# Patient Record
Sex: FEMALE | Race: WHITE | Employment: OTHER | URBAN - NONMETROPOLITAN AREA
[De-identification: names, ages, dates, MRNs, and addresses within clinical notes are randomized per-mention and may not be internally consistent; named-entity substitution may affect disease eponyms.]

---

## 2017-01-05 LAB
CHOLESTEROL, TOTAL: 173 MG/DL
CHOLESTEROL/HDL RATIO: NORMAL
HDLC SERPL-MCNC: 39 MG/DL (ref 35–70)
LDL CHOLESTEROL CALCULATED: 82 MG/DL (ref 0–160)
TRIGL SERPL-MCNC: 260 MG/DL
VLDLC SERPL CALC-MCNC: NORMAL MG/DL

## 2017-05-09 ENCOUNTER — PROCEDURE VISIT (OUTPATIENT)
Dept: CARDIOLOGY CLINIC | Age: 72
End: 2017-05-09

## 2017-05-09 DIAGNOSIS — Z95.2 AORTIC VALVE PROSTHESIS PRESENT: Primary | ICD-10-CM

## 2017-05-09 LAB
LV EF: 58 %
LVEF MODALITY: NORMAL

## 2017-05-09 PROCEDURE — 93306 TTE W/DOPPLER COMPLETE: CPT | Performed by: INTERNAL MEDICINE

## 2017-05-10 ENCOUNTER — TELEPHONE (OUTPATIENT)
Dept: CARDIOLOGY CLINIC | Age: 72
End: 2017-05-10

## 2017-05-16 ENCOUNTER — OFFICE VISIT (OUTPATIENT)
Dept: CARDIOLOGY CLINIC | Age: 72
End: 2017-05-16

## 2017-05-16 VITALS
HEART RATE: 78 BPM | HEIGHT: 64 IN | SYSTOLIC BLOOD PRESSURE: 118 MMHG | DIASTOLIC BLOOD PRESSURE: 68 MMHG | BODY MASS INDEX: 26.98 KG/M2 | WEIGHT: 158 LBS

## 2017-05-16 DIAGNOSIS — Z95.2 AORTIC VALVE PROSTHESIS PRESENT: ICD-10-CM

## 2017-05-16 DIAGNOSIS — I10 ESSENTIAL HYPERTENSION: ICD-10-CM

## 2017-05-16 DIAGNOSIS — E78.2 MIXED HYPERLIPIDEMIA: ICD-10-CM

## 2017-05-16 DIAGNOSIS — I25.10 CORONARY ARTERY DISEASE INVOLVING NATIVE HEART WITHOUT ANGINA PECTORIS, UNSPECIFIED VESSEL OR LESION TYPE: ICD-10-CM

## 2017-05-16 DIAGNOSIS — Z95.1 S/P CABG X 1: Primary | ICD-10-CM

## 2017-05-16 PROCEDURE — 99214 OFFICE O/P EST MOD 30 MIN: CPT | Performed by: INTERNAL MEDICINE

## 2017-05-16 PROCEDURE — 1036F TOBACCO NON-USER: CPT | Performed by: INTERNAL MEDICINE

## 2017-05-16 PROCEDURE — G8420 CALC BMI NORM PARAMETERS: HCPCS | Performed by: INTERNAL MEDICINE

## 2017-05-16 PROCEDURE — 3014F SCREEN MAMMO DOC REV: CPT | Performed by: INTERNAL MEDICINE

## 2017-05-16 PROCEDURE — 93000 ELECTROCARDIOGRAM COMPLETE: CPT | Performed by: INTERNAL MEDICINE

## 2017-05-16 PROCEDURE — 4040F PNEUMOC VAC/ADMIN/RCVD: CPT | Performed by: INTERNAL MEDICINE

## 2017-05-16 PROCEDURE — 3017F COLORECTAL CA SCREEN DOC REV: CPT | Performed by: INTERNAL MEDICINE

## 2017-05-16 PROCEDURE — G8598 ASA/ANTIPLAT THER USED: HCPCS | Performed by: INTERNAL MEDICINE

## 2017-05-16 PROCEDURE — 1123F ACP DISCUSS/DSCN MKR DOCD: CPT | Performed by: INTERNAL MEDICINE

## 2017-05-16 PROCEDURE — G8400 PT W/DXA NO RESULTS DOC: HCPCS | Performed by: INTERNAL MEDICINE

## 2017-05-16 PROCEDURE — 1090F PRES/ABSN URINE INCON ASSESS: CPT | Performed by: INTERNAL MEDICINE

## 2017-05-16 PROCEDURE — G8427 DOCREV CUR MEDS BY ELIG CLIN: HCPCS | Performed by: INTERNAL MEDICINE

## 2017-05-16 RX ORDER — ASPIRIN 81 MG/1
81 TABLET ORAL DAILY
Qty: 30 TABLET | Refills: 3 | Status: SHIPPED | OUTPATIENT
Start: 2017-05-16 | End: 2018-12-04 | Stop reason: ALTCHOICE

## 2017-05-16 RX ORDER — LISINOPRIL AND HYDROCHLOROTHIAZIDE 12.5; 1 MG/1; MG/1
1 TABLET ORAL DAILY
Qty: 90 TABLET | Refills: 3 | Status: ON HOLD | OUTPATIENT
Start: 2017-05-16 | End: 2017-10-12 | Stop reason: HOSPADM

## 2017-11-03 ENCOUNTER — INITIAL CONSULT (OUTPATIENT)
Dept: PULMONOLOGY | Age: 72
End: 2017-11-03

## 2017-11-03 VITALS
OXYGEN SATURATION: 95 % | HEART RATE: 85 BPM | DIASTOLIC BLOOD PRESSURE: 86 MMHG | SYSTOLIC BLOOD PRESSURE: 126 MMHG | BODY MASS INDEX: 23.22 KG/M2 | RESPIRATION RATE: 18 BRPM | HEIGHT: 64 IN | WEIGHT: 136 LBS

## 2017-11-03 DIAGNOSIS — J90 PLEURAL EFFUSION, RIGHT: Primary | ICD-10-CM

## 2017-11-03 DIAGNOSIS — C18.7 MALIGNANT NEOPLASM OF SIGMOID COLON (HCC): ICD-10-CM

## 2017-11-03 DIAGNOSIS — G47.34 NOCTURNAL HYPOXEMIA: ICD-10-CM

## 2017-11-03 DIAGNOSIS — J90 PLEURAL EFFUSION, RIGHT: ICD-10-CM

## 2017-11-03 LAB
DLCO %PRED: NORMAL
DLCO PRE: NORMAL
FEF 25-75%-POST: 0.86
FEF 25-75%-PRE: 0.97
FEV1-POST: 1.36
FEV1-PRE: 1.33
FEV1/FVC-POST: 60.7
FEV1/FVC-PRE: 72.9
FVC-POST: 2.25
FVC-PRE: 1.82
MEP: NORMAL
MIP: NORMAL
TLC %PRED: NORMAL
TLC PRE: NORMAL

## 2017-11-03 PROCEDURE — G8400 PT W/DXA NO RESULTS DOC: HCPCS | Performed by: INTERNAL MEDICINE

## 2017-11-03 PROCEDURE — G8598 ASA/ANTIPLAT THER USED: HCPCS | Performed by: INTERNAL MEDICINE

## 2017-11-03 PROCEDURE — 1111F DSCHRG MED/CURRENT MED MERGE: CPT | Performed by: INTERNAL MEDICINE

## 2017-11-03 PROCEDURE — 1090F PRES/ABSN URINE INCON ASSESS: CPT | Performed by: INTERNAL MEDICINE

## 2017-11-03 PROCEDURE — G8427 DOCREV CUR MEDS BY ELIG CLIN: HCPCS | Performed by: INTERNAL MEDICINE

## 2017-11-03 PROCEDURE — 3017F COLORECTAL CA SCREEN DOC REV: CPT | Performed by: INTERNAL MEDICINE

## 2017-11-03 PROCEDURE — 4040F PNEUMOC VAC/ADMIN/RCVD: CPT | Performed by: INTERNAL MEDICINE

## 2017-11-03 PROCEDURE — 1036F TOBACCO NON-USER: CPT | Performed by: INTERNAL MEDICINE

## 2017-11-03 PROCEDURE — G8420 CALC BMI NORM PARAMETERS: HCPCS | Performed by: INTERNAL MEDICINE

## 2017-11-03 PROCEDURE — 99215 OFFICE O/P EST HI 40 MIN: CPT | Performed by: INTERNAL MEDICINE

## 2017-11-03 PROCEDURE — 3014F SCREEN MAMMO DOC REV: CPT | Performed by: INTERNAL MEDICINE

## 2017-11-03 PROCEDURE — 1123F ACP DISCUSS/DSCN MKR DOCD: CPT | Performed by: INTERNAL MEDICINE

## 2017-11-03 PROCEDURE — G8484 FLU IMMUNIZE NO ADMIN: HCPCS | Performed by: INTERNAL MEDICINE

## 2017-11-03 ASSESSMENT — PULMONARY FUNCTION TESTS
FEV1_PRE: 1.33
FEV1/FVC_PRE: 72.9
FEV1_POST: 1.36
FVC_POST: 2.25
FEV1/FVC_POST: 60.7
FVC_PRE: 1.82

## 2017-11-03 NOTE — PROGRESS NOTES
Subjective:   CHIEF COMPLAINT / HPI: Liya Carey is a 60-year-old female recently discharged from [address] [place] [address] after presenting with shortness of breath and found to have hypoxemia. During initial workup she was noted to have a large right pleural effusion and she eventually underwent chest tube placement with drainage of her pleural effusion. The pleural fluid was an exudate but cytology negative. Last month she did undergo colon cancer surgery by Dr. Sam Menon. She does carry history cardiac disease status post valvular surgery and coronary bypass grafting. She did require prolonged chest tube insertion and drainage but eventually this decreased. She did require TPA instillation on at least 1 or 2 occasions to promote chest tube drainage. All cultures were negative from pleural fluid  She has no past history of cigarette smoking or COPD. During her hospitalization she was found to have significant nocturnal hypoxemia and was discharged home with nasal oxygen to use at nighttime. Other than mild nasal congestion and minimal cough which is mostly nonproductive she's having no other symptoms to suggest recurrence of her pleural effusion    Past Medical History:  Past Medical History:   Diagnosis Date    Abnormal finding on EKG     Acid reflux     Aortic stenosis     Aortic valve prosthesis present 8/12/2014 7/30/14 Dr Elizabeth Cabrera #23 Medtronic tissue valve     AR (aortic regurgitation)     Arthritis     \"Hands Mostly\"    CAD (coronary artery disease)     S/P CABG x1 7/2014    Cancer ([address]     colon    Family history of coronary artery disease     Fatigue     H/O cardiovascular stress test 2/7/12 2/12- Normal study-EF 70%    H/O echocardiogram 5/9/17, 1/5/16 5/9/17 Left ventricular function is normal. EF 43-75% Grade I diastolic dysfunction. Bioprosthetic AV wellseated and functioning normally.      Heart murmur     [place] (hard of hearing)     Bilateral Ears    Hyperlipidemia     Hypertension  MR (congenital mitral regurgitation)     Nocturnal hypoxemia 11/3/2017    Pneumonia Dx 1's    S/P CABG x 1 8/12/2014 7/30/14 [facility]- LAD Dr Jerica Moore Teeth missing     Lower    Wears glasses        Current Medications:    No current facility-administered medications for this visit. Allergies   Allergen Reactions    Prilosec [Omeprazole]      \"Got Real Lightheaded\"    Pravachol [Pravastatin Sodium]      \"I Don't Remember The Reaction\"       Social History:    Social History     Social History    Marital status:      Spouse name: N/A    Number of children: N/A    Years of education: N/A     Social History Main Topics    Smoking status: Never Smoker    Smokeless tobacco: Never Used      Comment: Reviewed    Alcohol use No    Drug use: No    Sexual activity: Yes     Partners: Male      Comment:      Other Topics Concern    None     Social History Narrative            Never use alcohol        Never use tobacco        Never use drugs        Caffeine 2 cups of coffee per day 1 soda every day Iced tea        Retired [employer] worker               Family History:    Family History   Problem Relation Age of Onset    Arthritis Mother     Depression Mother     Hearing Loss Mother     Heart Disease Brother      Heart Surgery    Migraines Son          REVIEW OF SYSTEMS:    CONSTITUTIONAL:  negative for fevers, chills, diaphoresis,  appetite change, night sweats and unexpected weight change.    HEENT:  negative for hearing loss,  sinus pressure,  epistaxis and snoring  RESPIRATORY:  See HPI  CARDIOVASCULAR:  Negative for chest pain, palpitations, exertional chest pressure/discomfort, edema, syncope  GASTROINTESTINAL: negative for nausea, vomiting, diarrhea, constipation, blood in stool and abdominal pain  GENITOURINARY:  negative for frequency, dysuria and hematuria  HEMATOLOGIC/LYMPHATIC:  negative for easy bruising, bleeding and lymphadenopathy  ALLERGIC/IMMUNOLOGIC:  negative for recurrent infections, angioedema, anaphylaxis and drug reaction  MUSCULOSKELETAL:  negative for  pain, joint swelling, decreased range of motion and muscle weakness    Objective:   PHYSICAL EXAM:      VITALS:    Vitals:    11/03/17 0859   BP: 126/86   Pulse: 85   Resp: 18   SpO2: 95%   Weight: 136 lb (61.7 kg)   Height: 5' 4\" (1.626 m)         CONSTITUTIONAL:  awake, alert, cooperative, no apparent distress, and appears stated age, Not overweight  NECK:  Supple and nontender,  trachea midline, no adenopathy, thyroid nl, no JVD, no wheezing or stridor over neck, No increased neck girth  CHEST: Chest expansion equal and symmetrical, no intercostal retraction, no increased work of breathing  LUNGS: Breath sounds are fairly well heard at both lung bases with no dullness to percussion. Her previous chest tube site in the right posterior thorax is healed well. No pleural rubs are noted and there is no wheezing or rhonchi   CARDIOVASCULAR: Normal S1 and S2 , no murmurs or gallops ,no pericardial rubs  ABDOMEN:  normal bowel sounds, non-distended and no masses palpated, and no tenderness to palpation. No hepatospleenomegaly  LYMPHADENOPATHY:  no axillary or supraclavicular adenopathy. No cervical adnenopathy  RIGHT AND LEFT LOWER EXTREMITIES: No edema, no inflammation, no tenderness. RADIOLOGY: Her last chest x-ray before discharge on 10/26/17  FINDINGS:   The right chest port catheter is stable.  Small effusion on the right with   basilar atelectasis or infiltrate, unchanged.  There is no evidence of   pulmonary edema or pneumothorax.  Stable cardiomediastinal silhouette.           Impression   No evidence of pneumothorax.  Stable chest.         PFT: Office spirometry demonstrates a mild obstructive defect. Because her FEV1 and FVC are both reduced I cannot rule out a restrictive lung process without further testing. Assessment:     1.  Pleural effusion, right 2. Nocturnal hypoxemia    3. Malignant neoplasm of sigmoid colon ([facility] [address]        Plan:   Since she is not wearing her oxygen I will obtain a nighttime oxygen saturation study while on room air to see if she still qualifies. Although she does have mild COPD on pulmonary function testing I am going to hold off on bronchodilator therapy at this time. I would like to repeat her chest x-ray including lateral decubitus films to see if there is any reaccumulation of her right-sided pleural effusion. If there is I will consider repeat thoracentesis for cytology and culture. I will continue to follow her  Return in about 4 months (around 3/3/2018) for Recheck for Shortness of Breath, Recheck for Pleural Effusion. This dictation was performed with a verbal recognition program and it was checked for errors. It is possible that there are still dictated errors within this office note. Any errors should be brought immediately to my attention for correction. All efforts were made to ensure that this office note is accurate.

## 2017-11-07 ENCOUNTER — HOSPITAL ENCOUNTER (OUTPATIENT)
Dept: GENERAL RADIOLOGY | Age: 72
Discharge: HOME | End: 2017-11-07
Attending: INTERNAL MEDICINE | Admitting: INTERNAL MEDICINE

## 2017-11-07 DIAGNOSIS — C18.7 MALIGNANT NEOPLASM OF SIGMOID COLON (HCC): ICD-10-CM

## 2017-11-07 DIAGNOSIS — J90 PLEURAL EFFUSION, RIGHT: ICD-10-CM

## 2017-11-14 ENCOUNTER — HOSPITAL ENCOUNTER (OUTPATIENT)
Dept: OTHER | Age: 72
Discharge: HOME | End: 2017-11-14
Attending: INTERNAL MEDICINE | Admitting: INTERNAL MEDICINE

## 2017-11-14 LAB
ALBUMIN SERPL-MCNC: 4.2 GM/DL (ref 3.4–5)
ALP BLD-CCNC: 70 IU/L (ref 40–129)
ALT SERPL-CCNC: 11 U/L (ref 10–40)
ANION GAP SERPL CALCULATED.3IONS-SCNC: 9 MMOL/L (ref 4–16)
AST SERPL-CCNC: 18 IU/L (ref 15–37)
BILIRUB SERPL-MCNC: 0.3 MG/DL (ref 0–1)
BUN BLDV-MCNC: 14 MG/DL (ref 6–23)
CALCIUM SERPL-MCNC: 9.6 MG/DL (ref 8.3–10.6)
CEA: 7.3 NG/ML
CHLORIDE BLD-SCNC: 105 MMOL/L (ref 99–110)
CO2: 30 MMOL/L (ref 21–32)
CREAT SERPL-MCNC: 0.8 MG/DL (ref 0.6–1.1)
GFR AFRICAN AMERICAN: >60 ML/MIN/1.73M2
GFR NON-AFRICAN AMERICAN: >60 ML/MIN/1.73M2
GLUCOSE BLD-MCNC: 95 MG/DL (ref 70–140)
POTASSIUM SERPL-SCNC: 5 MMOL/L (ref 3.5–5.1)
SODIUM BLD-SCNC: 144 MMOL/L (ref 135–145)
TOTAL PROTEIN: 7.2 GM/DL (ref 6.4–8.2)

## 2017-11-27 ENCOUNTER — HOSPITAL ENCOUNTER (OUTPATIENT)
Dept: OTHER | Age: 72
Discharge: HOME | End: 2017-11-27
Attending: INTERNAL MEDICINE | Admitting: INTERNAL MEDICINE

## 2017-11-27 LAB
ALBUMIN SERPL-MCNC: 4.2 GM/DL (ref 3.4–5)
ALP BLD-CCNC: 70 IU/L (ref 40–128)
ALT SERPL-CCNC: 10 U/L (ref 10–40)
ANION GAP SERPL CALCULATED.3IONS-SCNC: 11 MMOL/L (ref 4–16)
AST SERPL-CCNC: 18 IU/L (ref 15–37)
BILIRUB SERPL-MCNC: 0.4 MG/DL (ref 0–1)
BUN BLDV-MCNC: 13 MG/DL (ref 6–23)
CALCIUM SERPL-MCNC: 9.4 MG/DL (ref 8.3–10.6)
CHLORIDE BLD-SCNC: 103 MMOL/L (ref 99–110)
CO2: 30 MMOL/L (ref 21–32)
CREAT SERPL-MCNC: 0.8 MG/DL (ref 0.6–1.1)
GFR AFRICAN AMERICAN: >60 ML/MIN/1.73M2
GFR NON-AFRICAN AMERICAN: >60 ML/MIN/1.73M2
GLUCOSE BLD-MCNC: 86 MG/DL (ref 70–99)
POTASSIUM SERPL-SCNC: 4.6 MMOL/L (ref 3.5–5.1)
SODIUM BLD-SCNC: 144 MMOL/L (ref 135–145)
TOTAL PROTEIN: 7.2 GM/DL (ref 6.4–8.2)

## 2017-12-12 ENCOUNTER — HOSPITAL ENCOUNTER (OUTPATIENT)
Dept: OTHER | Age: 72
Discharge: HOME | End: 2017-12-12
Attending: INTERNAL MEDICINE | Admitting: INTERNAL MEDICINE

## 2017-12-12 LAB
ALBUMIN SERPL-MCNC: 3.9 GM/DL (ref 3.4–5)
ALP BLD-CCNC: 72 IU/L (ref 40–129)
ALT SERPL-CCNC: 13 U/L (ref 10–40)
ANION GAP SERPL CALCULATED.3IONS-SCNC: 11 MMOL/L (ref 4–16)
AST SERPL-CCNC: 21 IU/L (ref 15–37)
BILIRUB SERPL-MCNC: 0.2 MG/DL (ref 0–1)
BUN BLDV-MCNC: 17 MG/DL (ref 6–23)
CALCIUM SERPL-MCNC: 9.4 MG/DL (ref 8.3–10.6)
CEA: 5.1 NG/ML
CHLORIDE BLD-SCNC: 105 MMOL/L (ref 99–110)
CO2: 28 MMOL/L (ref 21–32)
CREAT SERPL-MCNC: 0.8 MG/DL (ref 0.6–1.1)
GFR AFRICAN AMERICAN: >60 ML/MIN/1.73M2
GFR NON-AFRICAN AMERICAN: >60 ML/MIN/1.73M2
GLUCOSE BLD-MCNC: 93 MG/DL (ref 70–99)
POTASSIUM SERPL-SCNC: 4.3 MMOL/L (ref 3.5–5.1)
SODIUM BLD-SCNC: 144 MMOL/L (ref 135–145)
TOTAL PROTEIN: 6.7 GM/DL (ref 6.4–8.2)

## 2017-12-21 ENCOUNTER — OFFICE VISIT (OUTPATIENT)
Dept: SURGERY | Age: 72
End: 2017-12-21

## 2017-12-21 VITALS
HEIGHT: 64 IN | HEART RATE: 71 BPM | WEIGHT: 136.02 LBS | SYSTOLIC BLOOD PRESSURE: 109 MMHG | DIASTOLIC BLOOD PRESSURE: 73 MMHG | BODY MASS INDEX: 23.22 KG/M2

## 2017-12-21 DIAGNOSIS — C18.7 MALIGNANT NEOPLASM OF SIGMOID COLON (HCC): Primary | ICD-10-CM

## 2017-12-21 PROCEDURE — 99024 POSTOP FOLLOW-UP VISIT: CPT | Performed by: SURGERY

## 2017-12-21 ASSESSMENT — ENCOUNTER SYMPTOMS
APNEA: 0
EYE REDNESS: 0
COLOR CHANGE: 0
SORE THROAT: 0
CONSTIPATION: 0
CHOKING: 0
PHOTOPHOBIA: 0
RECTAL PAIN: 0
ANAL BLEEDING: 0
STRIDOR: 0
EYE ITCHING: 0
BACK PAIN: 0

## 2017-12-21 NOTE — PROGRESS NOTES
Chief Complaint   Patient presents with    GI Problem     poss colostomy reversal-p/p mediport placed for colon ca       SUBJECTIVE:  HPI: Patient presents for evaluation of cancer involving the sigmoid colon. The tumor was involving the bladder wall which was identified intraoperatively. This was resected as well. Pt was diverted and now has colostomy and is receiving chemo. PT is here for colostomy reversal.   Thoroughly reviewed the patient's medical history, family history, social history and review of systems with the patient today in the office. Please see medical record for pertinent positives. Past Medical History:   Diagnosis Date    Abnormal finding on EKG     Acid reflux     Aortic stenosis     Aortic valve prosthesis present 8/12/2014 7/30/14 Dr Chica Ware #23 Medtronic tissue valve     AR (aortic regurgitation)     Arthritis     \"Hands Mostly\"    CAD (coronary artery disease)     S/P CABG x1 7/2014    Cancer ([address]     colon    Family history of coronary artery disease     Fatigue     H/O cardiovascular stress test 2/7/12 2/12- Normal study-EF 70%    H/O echocardiogram 5/9/17, 1/5/16 5/9/17 Left ventricular function is normal. EF 69-30% Grade I diastolic dysfunction. Bioprosthetic AV wellseated and functioning normally.      Heart murmur     [place] (hard of hearing)     Bilateral Ears    Hyperlipidemia     Hypertension     MR (congenital mitral regurgitation)     Nocturnal hypoxemia 11/3/2017    Pneumonia Dx 1's    S/P CABG x 1 8/12/2014 7/30/14 [facility]- LAD Dr Duke Sit Teeth missing     Lower    Wears glasses       Past Surgical History:   Procedure Laterality Date    ABDOMEN SURGERY      BREAST LUMPECTOMY Right 1980's    Benign    CARDIAC VALVE REPLACEMENT  7/30/14    AVR (Medtronic tissue valve; Dr. Chica Ware) w/CABG X1    CORONARY ARTERY BYPASS GRAFT  7/30/14    CABG x1 (LIMA to LAD) w/AVR     DENTAL SURGERY      Teeth Extracted In Past    DIAGNOSTIC CARDIAC CATH LAB PROCEDURE  7/28/14    Critical aortic stenosis, 70-80% proximal LAD lesion w/ulcerated plaque, aortic root dilatation, LVgram not done.  HYSTERECTOMY, TOTAL ABDOMINAL  1980's    OTHER SURGICAL HISTORY  10/07/2017    exp lap, small bowel resection, sigmoid colon resection, partial bladder repair, creation of colostomy     Current Outpatient Prescriptions   Medication Sig Dispense Refill    OXYGEN Inhale into the lungs nightly      traMADol (ULTRAM) 50 MG tablet Take 1 tablet by mouth every 6 hours as needed for Pain 20 tablet 0    cyclobenzaprine (FLEXERIL) 10 MG tablet Take 1 tablet by mouth nightly as needed for Muscle spasms 30 tablet 0    Omeprazole-Sodium Bicarbonate (ZEGERID PO) Take by mouth as needed      aspirin EC 81 MG EC tablet Take 1 tablet by mouth daily 30 tablet 3    simvastatin (ZOCOR) 40 MG tablet Take 40 mg by mouth nightly.  Calcium Carbonate-Vitamin D (CALCIUM + D) 600-200 MG-UNIT TABS Take 1 tablet by mouth every morning. Over The Counter       No current facility-administered medications for this visit. Allergies   Allergen Reactions    Prilosec [Omeprazole]      \"Got Real Lightheaded\"    Pravachol [Pravastatin Sodium]      \"I Don't Remember The Reaction\"       Review of Systems:         Review of Systems   Constitutional: Negative for chills and fever. HENT: Negative for ear pain, mouth sores, sore throat and tinnitus. Eyes: Negative for photophobia, redness and itching. Respiratory: Negative for apnea, choking and stridor. Cardiovascular: Negative for chest pain and palpitations. Gastrointestinal: Negative for anal bleeding, constipation and rectal pain. Endocrine: Negative for polydipsia. Genitourinary: Negative for enuresis, flank pain and hematuria. Musculoskeletal: Negative for back pain, joint swelling and myalgias. Skin: Negative for color change and pallor. Allergic/Immunologic: Negative for environmental allergies. Neurological: Negative for syncope and speech difficulty. Psychiatric/Behavioral: Negative for confusion and hallucinations. OBJECTIVE:  Physical Exam:    /73   Pulse 71   Ht 5' 4.02\" (1.626 m)   Wt 136 lb 0.4 oz (61.7 kg)   BMI 23.34 kg/m²      Physical Exam   Constitutional: She is oriented to person, place, and time. She appears well-developed and well-nourished. HENT:   Head: Normocephalic. Eyes: Pupils are equal, round, and reactive to light. Neck: Normal range of motion. Neck supple. Cardiovascular: Normal rate. Pulmonary/Chest: Effort normal.   Abdominal: Soft. She exhibits no distension and no mass. There is no tenderness. There is no rebound and no guarding. Musculoskeletal: Normal range of motion. Neurological: She is alert and oriented to person, place, and time. Skin: Skin is warm. Psychiatric: She has a normal mood and affect. ASSESSMENT:  1. Malignant neoplasm of sigmoid colon ([facility] [address]          PLAN:  Treatment:  Will proceed with c-scope. Will discuss care with Dr Rich Marrero. .  Patient counseled on risks, benefits, and alternatives of treatment plan at length. Patient states an understanding and willingness to proceed with plan. No orders of the defined types were placed in this encounter. No orders of the defined types were placed in this encounter. Follow Up:  Return in about 1 month (around 1/21/2018).       Tenisha Hathaway MD

## 2017-12-22 ENCOUNTER — TELEPHONE (OUTPATIENT)
Dept: SURGERY | Age: 72
End: 2017-12-22

## 2017-12-22 NOTE — TELEPHONE ENCOUNTER
SPOKE TO TRACY IN PERSON REGARDING CSCOPE SCHEDULED @ [facility]. NOTIFIED OF DATES, TIMES AND LOCATION. PHONE ASSESSMENT  CSCOPE - 1/17/18 @ 800  F/U - 1/25/18 @ [facility] START ON 1/16/18 - 4PM/4AM  NPO AFTER MIDNIGHT  OKAY TO CONT.  81MG ASA   AND PREP INFO GIVEN TO PATIENT

## 2017-12-26 ENCOUNTER — HOSPITAL ENCOUNTER (OUTPATIENT)
Dept: OTHER | Age: 72
Discharge: HOME | End: 2017-12-26
Attending: INTERNAL MEDICINE | Admitting: INTERNAL MEDICINE

## 2017-12-26 LAB
ALBUMIN SERPL-MCNC: 4 GM/DL (ref 3.4–5)
ALP BLD-CCNC: 92 IU/L (ref 40–128)
ALT SERPL-CCNC: 26 U/L (ref 10–40)
ANION GAP SERPL CALCULATED.3IONS-SCNC: 11 MMOL/L (ref 4–16)
AST SERPL-CCNC: 30 IU/L (ref 15–37)
BILIRUB SERPL-MCNC: 0.4 MG/DL (ref 0–1)
BUN BLDV-MCNC: 13 MG/DL (ref 6–23)
CALCIUM SERPL-MCNC: 9.2 MG/DL (ref 8.3–10.6)
CHLORIDE BLD-SCNC: 104 MMOL/L (ref 99–110)
CO2: 30 MMOL/L (ref 21–32)
CREAT SERPL-MCNC: 0.8 MG/DL (ref 0.6–1.1)
GFR AFRICAN AMERICAN: >60 ML/MIN/1.73M2
GFR NON-AFRICAN AMERICAN: >60 ML/MIN/1.73M2
GLUCOSE BLD-MCNC: 96 MG/DL (ref 70–99)
POTASSIUM SERPL-SCNC: 4.1 MMOL/L (ref 3.5–5.1)
SODIUM BLD-SCNC: 145 MMOL/L (ref 135–145)
TOTAL PROTEIN: 6.7 GM/DL (ref 6.4–8.2)

## 2018-01-05 ENCOUNTER — TELEPHONE (OUTPATIENT)
Dept: PULMONOLOGY | Age: 73
End: 2018-01-05

## 2018-01-05 NOTE — TELEPHONE ENCOUNTER
Can you please write an order/Rx to discontinue nocturnal oxygen d/t negative overnight pulse oximetry result. Thank you.

## 2018-01-08 ENCOUNTER — HOSPITAL ENCOUNTER (OUTPATIENT)
Dept: OTHER | Age: 73
Discharge: HOME | End: 2018-01-08
Attending: INTERNAL MEDICINE | Admitting: INTERNAL MEDICINE

## 2018-01-08 LAB
ALBUMIN SERPL-MCNC: 4.2 GM/DL (ref 3.4–5)
ALP BLD-CCNC: 100 IU/L (ref 40–128)
ALT SERPL-CCNC: 19 U/L (ref 10–40)
ANION GAP SERPL CALCULATED.3IONS-SCNC: 16 MMOL/L (ref 4–16)
AST SERPL-CCNC: 29 IU/L (ref 15–37)
BILIRUB SERPL-MCNC: 0.4 MG/DL (ref 0–1)
BUN BLDV-MCNC: 10 MG/DL (ref 6–23)
CALCIUM SERPL-MCNC: 9.7 MG/DL (ref 8.3–10.6)
CEA: 1.5 NG/ML
CHLORIDE BLD-SCNC: 103 MMOL/L (ref 99–110)
CO2: 31 MMOL/L (ref 21–32)
CREAT SERPL-MCNC: 0.8 MG/DL (ref 0.6–1.1)
GFR AFRICAN AMERICAN: >60 ML/MIN/1.73M2
GFR NON-AFRICAN AMERICAN: >60 ML/MIN/1.73M2
GLUCOSE BLD-MCNC: 100 MG/DL (ref 70–99)
POTASSIUM SERPL-SCNC: 3.5 MMOL/L (ref 3.5–5.1)
SODIUM BLD-SCNC: 150 MMOL/L (ref 135–145)
TOTAL PROTEIN: 6.8 GM/DL (ref 6.4–8.2)

## 2018-01-09 ENCOUNTER — HOSPITAL ENCOUNTER (OUTPATIENT)
Dept: OTHER | Age: 73
Discharge: HOME | End: 2018-01-09
Attending: INTERNAL MEDICINE | Admitting: INTERNAL MEDICINE

## 2018-01-09 LAB
ALBUMIN SERPL-MCNC: 3.8 GM/DL (ref 3.4–5)
ALP BLD-CCNC: 89 IU/L (ref 40–128)
ALT SERPL-CCNC: 17 U/L (ref 10–40)
ANION GAP SERPL CALCULATED.3IONS-SCNC: 15 MMOL/L (ref 4–16)
AST SERPL-CCNC: 25 IU/L (ref 15–37)
BILIRUB SERPL-MCNC: 0.4 MG/DL (ref 0–1)
BUN BLDV-MCNC: 12 MG/DL (ref 6–23)
CALCIUM SERPL-MCNC: 9.3 MG/DL (ref 8.3–10.6)
CEA: 1.4 NG/ML
CHLORIDE BLD-SCNC: 105 MMOL/L (ref 99–110)
CO2: 28 MMOL/L (ref 21–32)
CREAT SERPL-MCNC: 0.7 MG/DL (ref 0.6–1.1)
GFR AFRICAN AMERICAN: >60 ML/MIN/1.73M2
GFR NON-AFRICAN AMERICAN: >60 ML/MIN/1.73M2
GLUCOSE BLD-MCNC: 101 MG/DL (ref 70–99)
POTASSIUM SERPL-SCNC: 3.4 MMOL/L (ref 3.5–5.1)
SODIUM BLD-SCNC: 148 MMOL/L (ref 135–145)
TOTAL PROTEIN: 6.4 GM/DL (ref 6.4–8.2)

## 2018-01-12 ENCOUNTER — TELEPHONE (OUTPATIENT)
Dept: SURGERY | Age: 73
End: 2018-01-12

## 2018-01-16 ENCOUNTER — TELEPHONE (OUTPATIENT)
Dept: SURGERY | Age: 73
End: 2018-01-16

## 2018-01-16 ENCOUNTER — HOSPITAL ENCOUNTER (OUTPATIENT)
Dept: OTHER | Age: 73
Discharge: HOME | End: 2018-01-16
Attending: INTERNAL MEDICINE | Admitting: INTERNAL MEDICINE

## 2018-01-16 LAB
ALBUMIN SERPL-MCNC: 4.2 GM/DL (ref 3.4–5)
ALP BLD-CCNC: 99 IU/L (ref 40–128)
ALT SERPL-CCNC: 19 U/L (ref 10–40)
ANION GAP SERPL CALCULATED.3IONS-SCNC: 12 MMOL/L (ref 4–16)
AST SERPL-CCNC: 28 IU/L (ref 15–37)
BILIRUB SERPL-MCNC: 0.4 MG/DL (ref 0–1)
BUN BLDV-MCNC: 11 MG/DL (ref 6–23)
CALCIUM SERPL-MCNC: 9.7 MG/DL (ref 8.3–10.6)
CHLORIDE BLD-SCNC: 102 MMOL/L (ref 99–110)
CO2: 31 MMOL/L (ref 21–32)
CREAT SERPL-MCNC: 0.9 MG/DL (ref 0.6–1.1)
GFR AFRICAN AMERICAN: >60 ML/MIN/1.73M2
GFR NON-AFRICAN AMERICAN: >60 ML/MIN/1.73M2
GLUCOSE BLD-MCNC: 106 MG/DL (ref 70–99)
POTASSIUM SERPL-SCNC: 3.9 MMOL/L (ref 3.5–5.1)
SODIUM BLD-SCNC: 145 MMOL/L (ref 135–145)
TOTAL PROTEIN: 6.9 GM/DL (ref 6.4–8.2)

## 2018-01-17 ENCOUNTER — HOSPITAL ENCOUNTER (OUTPATIENT)
Dept: SURGERY | Age: 73
Discharge: HOME | End: 2018-02-08
Attending: SURGERY | Admitting: SURGERY

## 2018-01-17 ENCOUNTER — TELEPHONE (OUTPATIENT)
Dept: SURGERY | Age: 73
End: 2018-01-17

## 2018-01-26 ENCOUNTER — HOSPITAL ENCOUNTER (OUTPATIENT)
Dept: OTHER | Age: 73
Discharge: HOME | End: 2018-01-26
Attending: INTERNAL MEDICINE | Admitting: INTERNAL MEDICINE

## 2018-01-26 LAB
ALBUMIN SERPL-MCNC: 4.3 GM/DL (ref 3.4–5)
ALP BLD-CCNC: 94 IU/L (ref 40–128)
ALT SERPL-CCNC: 15 U/L (ref 10–40)
ANION GAP SERPL CALCULATED.3IONS-SCNC: 15 MMOL/L (ref 4–16)
AST SERPL-CCNC: 26 IU/L (ref 15–37)
BILIRUB SERPL-MCNC: 0.4 MG/DL (ref 0–1)
BUN BLDV-MCNC: 11 MG/DL (ref 6–23)
CALCIUM SERPL-MCNC: 9.7 MG/DL (ref 8.3–10.6)
CEA: 0.9 NG/ML
CHLORIDE BLD-SCNC: 103 MMOL/L (ref 99–110)
CO2: 30 MMOL/L (ref 21–32)
CREAT SERPL-MCNC: 0.8 MG/DL (ref 0.6–1.1)
GFR AFRICAN AMERICAN: >60 ML/MIN/1.73M2
GFR NON-AFRICAN AMERICAN: >60 ML/MIN/1.73M2
GLUCOSE BLD-MCNC: 90 MG/DL (ref 70–99)
POTASSIUM SERPL-SCNC: 4.1 MMOL/L (ref 3.5–5.1)
SODIUM BLD-SCNC: 148 MMOL/L (ref 135–145)
TOTAL PROTEIN: 7 GM/DL (ref 6.4–8.2)

## 2018-02-07 ENCOUNTER — OFFICE VISIT (OUTPATIENT)
Dept: SURGERY | Age: 73
End: 2018-02-07

## 2018-02-07 VITALS
SYSTOLIC BLOOD PRESSURE: 110 MMHG | HEIGHT: 64 IN | BODY MASS INDEX: 24.09 KG/M2 | DIASTOLIC BLOOD PRESSURE: 62 MMHG | WEIGHT: 141.09 LBS

## 2018-02-07 DIAGNOSIS — C18.7 MALIGNANT NEOPLASM OF SIGMOID COLON (HCC): Primary | ICD-10-CM

## 2018-02-07 PROCEDURE — 4040F PNEUMOC VAC/ADMIN/RCVD: CPT | Performed by: SURGERY

## 2018-02-07 PROCEDURE — G8420 CALC BMI NORM PARAMETERS: HCPCS | Performed by: SURGERY

## 2018-02-07 PROCEDURE — 1036F TOBACCO NON-USER: CPT | Performed by: SURGERY

## 2018-02-07 PROCEDURE — G8484 FLU IMMUNIZE NO ADMIN: HCPCS | Performed by: SURGERY

## 2018-02-07 PROCEDURE — 1123F ACP DISCUSS/DSCN MKR DOCD: CPT | Performed by: SURGERY

## 2018-02-07 PROCEDURE — G8428 CUR MEDS NOT DOCUMENT: HCPCS | Performed by: SURGERY

## 2018-02-07 PROCEDURE — G8599 NO ASA/ANTIPLAT THER USE RNG: HCPCS | Performed by: SURGERY

## 2018-02-07 PROCEDURE — 99213 OFFICE O/P EST LOW 20 MIN: CPT | Performed by: SURGERY

## 2018-02-07 PROCEDURE — 1090F PRES/ABSN URINE INCON ASSESS: CPT | Performed by: SURGERY

## 2018-02-07 PROCEDURE — 3017F COLORECTAL CA SCREEN DOC REV: CPT | Performed by: SURGERY

## 2018-02-07 PROCEDURE — G8400 PT W/DXA NO RESULTS DOC: HCPCS | Performed by: SURGERY

## 2018-02-07 PROCEDURE — 3014F SCREEN MAMMO DOC REV: CPT | Performed by: SURGERY

## 2018-02-07 NOTE — PROGRESS NOTES
\"Got Real Lightheaded\"    Pravachol [Pravastatin Sodium]      \"I Don't Remember The Reaction\"       Review of Systems:         Review of Systems   Constitutional: Negative for chills and fever. HENT: Negative for ear pain, mouth sores, sore throat and tinnitus. Eyes: Negative for photophobia, redness and itching. Respiratory: Negative for apnea, choking and stridor. Cardiovascular: Negative for chest pain and palpitations. Gastrointestinal: Negative for anal bleeding, constipation and rectal pain. Endocrine: Negative for polydipsia. Genitourinary: Negative for enuresis, flank pain and hematuria. Musculoskeletal: Negative for back pain, joint swelling and myalgias. Skin: Negative for color change and pallor. Allergic/Immunologic: Negative for environmental allergies. Neurological: Negative for syncope and speech difficulty. Psychiatric/Behavioral: Negative for confusion and hallucinations. OBJECTIVE:  Physical Exam:    /62   Ht 5' 4.02\" (1.626 m)   Wt 141 lb 1.5 oz (64 kg)   BMI 24.21 kg/m²      Physical Exam   Constitutional: She is oriented to person, place, and time. She appears well-developed and well-nourished. HENT:   Head: Normocephalic. Eyes: Pupils are equal, round, and reactive to light. Neck: Normal range of motion. Neck supple. Cardiovascular: Normal rate. Pulmonary/Chest: Effort normal.   Abdominal: Soft. She exhibits no distension and no mass. There is no tenderness. There is no rebound and no guarding. Musculoskeletal: Normal range of motion. Neurological: She is alert and oriented to person, place, and time. Skin: Skin is warm. Psychiatric: She has a normal mood and affect. ASSESSMENT:  1. Malignant neoplasm of sigmoid colon ([facility] [address]          PLAN:  Treatment:  . Next colonoscopy in 3 years. Patient counseled on risks, benefits, and alternatives of treatment plan at length.  Patient states an understanding and willingness to proceed with plan. No orders of the defined types were placed in this encounter. No orders of the defined types were placed in this encounter. Follow Up:  Return in about 1 month (around 3/7/2018).       Eleanor New MD

## 2018-02-09 ENCOUNTER — HOSPITAL ENCOUNTER (OUTPATIENT)
Dept: OTHER | Age: 73
Discharge: HOME | End: 2018-02-09
Attending: INTERNAL MEDICINE | Admitting: INTERNAL MEDICINE

## 2018-02-09 LAB
ALBUMIN SERPL-MCNC: 4.1 GM/DL (ref 3.4–5)
ALP BLD-CCNC: 104 IU/L (ref 40–128)
ALT SERPL-CCNC: 18 U/L (ref 10–40)
ANION GAP SERPL CALCULATED.3IONS-SCNC: 10 MMOL/L (ref 4–16)
AST SERPL-CCNC: 27 IU/L (ref 15–37)
BILIRUB SERPL-MCNC: 0.4 MG/DL (ref 0–1)
BUN BLDV-MCNC: 13 MG/DL (ref 6–23)
CALCIUM SERPL-MCNC: 9.8 MG/DL (ref 8.3–10.6)
CHLORIDE BLD-SCNC: 99 MMOL/L (ref 99–110)
CO2: 34 MMOL/L (ref 21–32)
CREAT SERPL-MCNC: 0.8 MG/DL (ref 0.6–1.1)
GFR AFRICAN AMERICAN: >60 ML/MIN/1.73M2
GFR NON-AFRICAN AMERICAN: >60 ML/MIN/1.73M2
GLUCOSE BLD-MCNC: 90 MG/DL (ref 70–99)
POTASSIUM SERPL-SCNC: 4.3 MMOL/L (ref 3.5–5.1)
SODIUM BLD-SCNC: 143 MMOL/L (ref 135–145)
TOTAL PROTEIN: 6.8 GM/DL (ref 6.4–8.2)

## 2018-02-23 ENCOUNTER — HOSPITAL ENCOUNTER (OUTPATIENT)
Dept: OTHER | Age: 73
Discharge: HOME | End: 2018-02-23
Attending: INTERNAL MEDICINE | Admitting: INTERNAL MEDICINE

## 2018-02-23 LAB
ALBUMIN SERPL-MCNC: 3.4 GM/DL (ref 3.4–5)
ALP BLD-CCNC: 82 IU/L (ref 40–128)
ALT SERPL-CCNC: 20 U/L (ref 10–40)
ANION GAP SERPL CALCULATED.3IONS-SCNC: 10 MMOL/L (ref 4–16)
AST SERPL-CCNC: 26 IU/L (ref 15–37)
BILIRUB SERPL-MCNC: 0.2 MG/DL (ref 0–1)
BUN BLDV-MCNC: 14 MG/DL (ref 6–23)
CALCIUM SERPL-MCNC: 9 MG/DL (ref 8.3–10.6)
CEA: 1.1 NG/ML
CHLORIDE BLD-SCNC: 99 MMOL/L (ref 99–110)
CO2: 31 MMOL/L (ref 21–32)
CREAT SERPL-MCNC: 0.8 MG/DL (ref 0.6–1.1)
GFR AFRICAN AMERICAN: >60 ML/MIN/1.73M2
GFR NON-AFRICAN AMERICAN: >60 ML/MIN/1.73M2
GLUCOSE BLD-MCNC: 105 MG/DL (ref 70–99)
POTASSIUM SERPL-SCNC: 3.8 MMOL/L (ref 3.5–5.1)
SODIUM BLD-SCNC: 140 MMOL/L (ref 135–145)
TOTAL PROTEIN: 6 GM/DL (ref 6.4–8.2)

## 2018-03-05 ENCOUNTER — OFFICE VISIT (OUTPATIENT)
Dept: PULMONOLOGY | Age: 73
End: 2018-03-05

## 2018-03-05 VITALS
SYSTOLIC BLOOD PRESSURE: 118 MMHG | DIASTOLIC BLOOD PRESSURE: 80 MMHG | BODY MASS INDEX: 23.82 KG/M2 | HEIGHT: 65 IN | RESPIRATION RATE: 20 BRPM | HEART RATE: 81 BPM | WEIGHT: 143 LBS | OXYGEN SATURATION: 97 %

## 2018-03-05 DIAGNOSIS — J90 PLEURAL EFFUSION, RIGHT: Primary | ICD-10-CM

## 2018-03-05 DIAGNOSIS — C18.7 MALIGNANT NEOPLASM OF SIGMOID COLON (HCC): ICD-10-CM

## 2018-03-05 PROCEDURE — G8427 DOCREV CUR MEDS BY ELIG CLIN: HCPCS | Performed by: INTERNAL MEDICINE

## 2018-03-05 PROCEDURE — G8484 FLU IMMUNIZE NO ADMIN: HCPCS | Performed by: INTERNAL MEDICINE

## 2018-03-05 PROCEDURE — G8400 PT W/DXA NO RESULTS DOC: HCPCS | Performed by: INTERNAL MEDICINE

## 2018-03-05 PROCEDURE — G8599 NO ASA/ANTIPLAT THER USE RNG: HCPCS | Performed by: INTERNAL MEDICINE

## 2018-03-05 PROCEDURE — 3017F COLORECTAL CA SCREEN DOC REV: CPT | Performed by: INTERNAL MEDICINE

## 2018-03-05 PROCEDURE — 1123F ACP DISCUSS/DSCN MKR DOCD: CPT | Performed by: INTERNAL MEDICINE

## 2018-03-05 PROCEDURE — 1090F PRES/ABSN URINE INCON ASSESS: CPT | Performed by: INTERNAL MEDICINE

## 2018-03-05 PROCEDURE — 1036F TOBACCO NON-USER: CPT | Performed by: INTERNAL MEDICINE

## 2018-03-05 PROCEDURE — 4040F PNEUMOC VAC/ADMIN/RCVD: CPT | Performed by: INTERNAL MEDICINE

## 2018-03-05 PROCEDURE — 99213 OFFICE O/P EST LOW 20 MIN: CPT | Performed by: INTERNAL MEDICINE

## 2018-03-05 PROCEDURE — G8420 CALC BMI NORM PARAMETERS: HCPCS | Performed by: INTERNAL MEDICINE

## 2018-03-05 PROCEDURE — 3014F SCREEN MAMMO DOC REV: CPT | Performed by: INTERNAL MEDICINE

## 2018-03-05 NOTE — PROGRESS NOTES
recurrence of her pleural effusion. I will continue to follow her  Return in about 6 months (around 9/5/2018) for Recheck for Pleural Effusion. This dictation was performed with a verbal recognition program and it was checked for errors. It is possible that there are still dictated errors within this office note. Any errors should be brought immediately to my attention for correction. All efforts were made to ensure that this office note is accurate.

## 2018-03-12 ENCOUNTER — HOSPITAL ENCOUNTER (OUTPATIENT)
Dept: OTHER | Age: 73
Discharge: HOME | End: 2018-03-12
Attending: INTERNAL MEDICINE | Admitting: INTERNAL MEDICINE

## 2018-03-12 LAB
ALBUMIN SERPL-MCNC: 3.8 GM/DL (ref 3.4–5)
ALP BLD-CCNC: 93 IU/L (ref 40–128)
ALT SERPL-CCNC: 23 U/L (ref 10–40)
ANION GAP SERPL CALCULATED.3IONS-SCNC: 11 MMOL/L (ref 4–16)
AST SERPL-CCNC: 30 IU/L (ref 15–37)
BILIRUB SERPL-MCNC: 0.4 MG/DL (ref 0–1)
BUN BLDV-MCNC: 16 MG/DL (ref 6–23)
CALCIUM SERPL-MCNC: 9.4 MG/DL (ref 8.3–10.6)
CHLORIDE BLD-SCNC: 103 MMOL/L (ref 99–110)
CO2: 31 MMOL/L (ref 21–32)
CREAT SERPL-MCNC: 0.8 MG/DL (ref 0.6–1.1)
GFR AFRICAN AMERICAN: >60 ML/MIN/1.73M2
GFR NON-AFRICAN AMERICAN: >60 ML/MIN/1.73M2
GLUCOSE BLD-MCNC: 100 MG/DL (ref 70–99)
POTASSIUM SERPL-SCNC: 4.1 MMOL/L (ref 3.5–5.1)
SODIUM BLD-SCNC: 145 MMOL/L (ref 135–145)
TOTAL PROTEIN: 6.2 GM/DL (ref 6.4–8.2)

## 2018-03-12 ASSESSMENT — ENCOUNTER SYMPTOMS
CHOKING: 0
CONSTIPATION: 0
BACK PAIN: 0
STRIDOR: 0
ANAL BLEEDING: 0
RECTAL PAIN: 0
COLOR CHANGE: 0
EYE REDNESS: 0
SORE THROAT: 0
EYE ITCHING: 0
APNEA: 0
PHOTOPHOBIA: 0

## 2018-03-26 ENCOUNTER — HOSPITAL ENCOUNTER (OUTPATIENT)
Dept: OTHER | Age: 73
Discharge: HOME | End: 2018-03-26
Attending: INTERNAL MEDICINE | Admitting: INTERNAL MEDICINE

## 2018-03-26 LAB
ALBUMIN SERPL-MCNC: 3.7 GM/DL (ref 3.4–5)
ALP BLD-CCNC: 91 IU/L (ref 40–128)
ALT SERPL-CCNC: 20 U/L (ref 10–40)
ANION GAP SERPL CALCULATED.3IONS-SCNC: 9 MMOL/L (ref 4–16)
AST SERPL-CCNC: 28 IU/L (ref 15–37)
BILIRUB SERPL-MCNC: 0.5 MG/DL (ref 0–1)
BUN BLDV-MCNC: 12 MG/DL (ref 6–23)
CALCIUM SERPL-MCNC: 9.3 MG/DL (ref 8.3–10.6)
CHLORIDE BLD-SCNC: 104 MMOL/L (ref 99–110)
CO2: 32 MMOL/L (ref 21–32)
CREAT SERPL-MCNC: 0.8 MG/DL (ref 0.6–1.1)
GFR AFRICAN AMERICAN: >60 ML/MIN/1.73M2
GFR NON-AFRICAN AMERICAN: >60 ML/MIN/1.73M2
GLUCOSE BLD-MCNC: 98 MG/DL (ref 70–99)
POTASSIUM SERPL-SCNC: 3.8 MMOL/L (ref 3.5–5.1)
SODIUM BLD-SCNC: 145 MMOL/L (ref 135–145)
TOTAL PROTEIN: 5.9 GM/DL (ref 6.4–8.2)

## 2018-04-09 ENCOUNTER — HOSPITAL ENCOUNTER (OUTPATIENT)
Dept: OTHER | Age: 73
Discharge: HOME | End: 2018-04-09
Attending: INTERNAL MEDICINE | Admitting: INTERNAL MEDICINE

## 2018-04-09 LAB
ALBUMIN SERPL-MCNC: 3.6 GM/DL (ref 3.4–5)
ALP BLD-CCNC: 96 IU/L (ref 40–128)
ALT SERPL-CCNC: 25 U/L (ref 10–40)
ANION GAP SERPL CALCULATED.3IONS-SCNC: 12 MMOL/L (ref 4–16)
AST SERPL-CCNC: 35 IU/L (ref 15–37)
BILIRUB SERPL-MCNC: 0.5 MG/DL (ref 0–1)
BUN BLDV-MCNC: 9 MG/DL (ref 6–23)
CALCIUM SERPL-MCNC: 9.2 MG/DL (ref 8.3–10.6)
CHLORIDE BLD-SCNC: 102 MMOL/L (ref 99–110)
CO2: 30 MMOL/L (ref 21–32)
CREAT SERPL-MCNC: 0.8 MG/DL (ref 0.6–1.1)
GFR AFRICAN AMERICAN: >60 ML/MIN/1.73M2
GFR NON-AFRICAN AMERICAN: >60 ML/MIN/1.73M2
GLUCOSE BLD-MCNC: 133 MG/DL (ref 70–99)
POTASSIUM SERPL-SCNC: 3.2 MMOL/L (ref 3.5–5.1)
SODIUM BLD-SCNC: 144 MMOL/L (ref 135–145)
TOTAL PROTEIN: 5.8 GM/DL (ref 6.4–8.2)

## 2018-04-23 ENCOUNTER — HOSPITAL ENCOUNTER (OUTPATIENT)
Dept: OTHER | Age: 73
Discharge: HOME | End: 2018-04-23
Attending: NURSE PRACTITIONER | Admitting: NURSE PRACTITIONER

## 2018-04-23 LAB
ALBUMIN SERPL-MCNC: 3.6 GM/DL (ref 3.4–5)
ALP BLD-CCNC: 106 IU/L (ref 40–129)
ALT SERPL-CCNC: 25 U/L (ref 10–40)
ANION GAP SERPL CALCULATED.3IONS-SCNC: 9 MMOL/L (ref 4–16)
AST SERPL-CCNC: 34 IU/L (ref 15–37)
BILIRUB SERPL-MCNC: 0.6 MG/DL (ref 0–1)
BUN BLDV-MCNC: 11 MG/DL (ref 6–23)
CALCIUM SERPL-MCNC: 9.6 MG/DL (ref 8.3–10.6)
CHLORIDE BLD-SCNC: 104 MMOL/L (ref 99–110)
CO2: 31 MMOL/L (ref 21–32)
CREAT SERPL-MCNC: 0.9 MG/DL (ref 0.6–1.1)
GFR AFRICAN AMERICAN: >60 ML/MIN/1.73M2
GFR NON-AFRICAN AMERICAN: >60 ML/MIN/1.73M2
GLUCOSE BLD-MCNC: 98 MG/DL (ref 70–99)
POTASSIUM SERPL-SCNC: 3.5 MMOL/L (ref 3.5–5.1)
SODIUM BLD-SCNC: 144 MMOL/L (ref 135–145)
TOTAL PROTEIN: 5.9 GM/DL (ref 6.4–8.2)

## 2018-04-30 PROBLEM — R55 SYNCOPE AND COLLAPSE: Status: ACTIVE | Noted: 2018-04-30

## 2018-05-03 ENCOUNTER — TELEPHONE (OUTPATIENT)
Dept: CARDIOLOGY CLINIC | Age: 73
End: 2018-05-03

## 2018-05-03 ENCOUNTER — NURSE ONLY (OUTPATIENT)
Dept: CARDIOLOGY CLINIC | Age: 73
End: 2018-05-03

## 2018-05-03 DIAGNOSIS — R55 SYNCOPE, UNSPECIFIED SYNCOPE TYPE: Primary | ICD-10-CM

## 2018-05-03 PROCEDURE — 93225 XTRNL ECG REC<48 HRS REC: CPT | Performed by: INTERNAL MEDICINE

## 2018-05-07 ENCOUNTER — HOSPITAL ENCOUNTER (OUTPATIENT)
Dept: OTHER | Age: 73
Discharge: HOME | End: 2018-05-07
Attending: NURSE PRACTITIONER | Admitting: NURSE PRACTITIONER

## 2018-05-07 LAB
ALBUMIN SERPL-MCNC: 3.6 GM/DL (ref 3.4–5)
ALP BLD-CCNC: 98 IU/L (ref 40–128)
ALT SERPL-CCNC: 22 U/L (ref 10–40)
ANION GAP SERPL CALCULATED.3IONS-SCNC: 11 MMOL/L (ref 4–16)
AST SERPL-CCNC: 33 IU/L (ref 15–37)
BILIRUB SERPL-MCNC: 0.6 MG/DL (ref 0–1)
BUN BLDV-MCNC: 10 MG/DL (ref 6–23)
CALCIUM SERPL-MCNC: 9.2 MG/DL (ref 8.3–10.6)
CHLORIDE BLD-SCNC: 108 MMOL/L (ref 99–110)
CO2: 28 MMOL/L (ref 21–32)
CREAT SERPL-MCNC: 0.8 MG/DL (ref 0.6–1.1)
GFR AFRICAN AMERICAN: >60 ML/MIN/1.73M2
GFR NON-AFRICAN AMERICAN: >60 ML/MIN/1.73M2
GLUCOSE BLD-MCNC: 130 MG/DL (ref 70–99)
POTASSIUM SERPL-SCNC: 3.6 MMOL/L (ref 3.5–5.1)
SODIUM BLD-SCNC: 147 MMOL/L (ref 135–145)
TOTAL PROTEIN: 5.8 GM/DL (ref 6.4–8.2)

## 2018-05-15 ENCOUNTER — OFFICE VISIT (OUTPATIENT)
Dept: CARDIOLOGY CLINIC | Age: 73
End: 2018-05-15

## 2018-05-15 VITALS
SYSTOLIC BLOOD PRESSURE: 138 MMHG | HEART RATE: 80 BPM | WEIGHT: 150 LBS | BODY MASS INDEX: 24.99 KG/M2 | DIASTOLIC BLOOD PRESSURE: 72 MMHG | RESPIRATION RATE: 16 BRPM | HEIGHT: 65 IN

## 2018-05-15 DIAGNOSIS — Z95.1 S/P CABG X 1: ICD-10-CM

## 2018-05-15 DIAGNOSIS — Z95.2 AORTIC VALVE PROSTHESIS PRESENT: ICD-10-CM

## 2018-05-15 DIAGNOSIS — R55 SYNCOPE AND COLLAPSE: Primary | ICD-10-CM

## 2018-05-15 DIAGNOSIS — R53.82 CHRONIC FATIGUE: ICD-10-CM

## 2018-05-15 DIAGNOSIS — E78.2 MIXED HYPERLIPIDEMIA: ICD-10-CM

## 2018-05-15 PROCEDURE — 1123F ACP DISCUSS/DSCN MKR DOCD: CPT | Performed by: INTERNAL MEDICINE

## 2018-05-15 PROCEDURE — G8599 NO ASA/ANTIPLAT THER USE RNG: HCPCS | Performed by: INTERNAL MEDICINE

## 2018-05-15 PROCEDURE — 1036F TOBACCO NON-USER: CPT | Performed by: INTERNAL MEDICINE

## 2018-05-15 PROCEDURE — G8400 PT W/DXA NO RESULTS DOC: HCPCS | Performed by: INTERNAL MEDICINE

## 2018-05-15 PROCEDURE — 3017F COLORECTAL CA SCREEN DOC REV: CPT | Performed by: INTERNAL MEDICINE

## 2018-05-15 PROCEDURE — 93227 XTRNL ECG REC<48 HR R&I: CPT | Performed by: INTERNAL MEDICINE

## 2018-05-15 PROCEDURE — 99214 OFFICE O/P EST MOD 30 MIN: CPT | Performed by: INTERNAL MEDICINE

## 2018-05-15 PROCEDURE — G8420 CALC BMI NORM PARAMETERS: HCPCS | Performed by: INTERNAL MEDICINE

## 2018-05-15 PROCEDURE — 1090F PRES/ABSN URINE INCON ASSESS: CPT | Performed by: INTERNAL MEDICINE

## 2018-05-15 PROCEDURE — 4040F PNEUMOC VAC/ADMIN/RCVD: CPT | Performed by: INTERNAL MEDICINE

## 2018-05-15 PROCEDURE — G8427 DOCREV CUR MEDS BY ELIG CLIN: HCPCS | Performed by: INTERNAL MEDICINE

## 2018-05-15 RX ORDER — POTASSIUM CHLORIDE 20 MEQ/1
20 TABLET, EXTENDED RELEASE ORAL DAILY
COMMUNITY
End: 2018-12-04 | Stop reason: DRUGHIGH

## 2018-05-16 LAB — TSH SERPL DL<=0.05 MIU/L-ACNC: 0.85 UIU/ML

## 2018-05-22 ENCOUNTER — PROCEDURE VISIT (OUTPATIENT)
Dept: CARDIOLOGY CLINIC | Age: 73
End: 2018-05-22

## 2018-05-22 DIAGNOSIS — Z95.2 AORTIC VALVE PROSTHESIS PRESENT: Primary | ICD-10-CM

## 2018-05-22 DIAGNOSIS — R53.82 CHRONIC FATIGUE: ICD-10-CM

## 2018-05-22 LAB
LV EF: 58 %
LVEF MODALITY: NORMAL

## 2018-05-22 PROCEDURE — 93306 TTE W/DOPPLER COMPLETE: CPT | Performed by: INTERNAL MEDICINE

## 2018-05-24 ENCOUNTER — TELEPHONE (OUTPATIENT)
Dept: CARDIOLOGY CLINIC | Age: 73
End: 2018-05-24

## 2018-06-11 ENCOUNTER — HOSPITAL ENCOUNTER (OUTPATIENT)
Dept: OTHER | Age: 73
Discharge: HOME | End: 2018-06-11
Attending: INTERNAL MEDICINE | Admitting: INTERNAL MEDICINE

## 2018-06-11 LAB
ALBUMIN SERPL-MCNC: 3.8 GM/DL (ref 3.4–5)
ALP BLD-CCNC: 202 IU/L (ref 40–128)
ALT SERPL-CCNC: 27 U/L (ref 10–40)
ANION GAP SERPL CALCULATED.3IONS-SCNC: 12 MMOL/L (ref 4–16)
AST SERPL-CCNC: 42 IU/L (ref 15–37)
BILIRUB SERPL-MCNC: 0.5 MG/DL (ref 0–1)
BUN BLDV-MCNC: 10 MG/DL (ref 6–23)
CALCIUM SERPL-MCNC: 9.4 MG/DL (ref 8.3–10.6)
CEA: 1.2 NG/ML
CHLORIDE BLD-SCNC: 104 MMOL/L (ref 99–110)
CO2: 29 MMOL/L (ref 21–32)
CREAT SERPL-MCNC: 0.8 MG/DL (ref 0.6–1.1)
GFR AFRICAN AMERICAN: >60 ML/MIN/1.73M2
GFR NON-AFRICAN AMERICAN: >60 ML/MIN/1.73M2
GLUCOSE BLD-MCNC: 121 MG/DL (ref 70–99)
POTASSIUM SERPL-SCNC: 3.4 MMOL/L (ref 3.5–5.1)
SODIUM BLD-SCNC: 145 MMOL/L (ref 135–145)
TOTAL PROTEIN: 6.2 GM/DL (ref 6.4–8.2)

## 2018-07-16 RX ORDER — NITROGLYCERIN 0.4 MG/1
TABLET SUBLINGUAL
Status: DISCONTINUED
Start: 2018-07-16 | End: 2018-07-16 | Stop reason: WASHOUT

## 2018-07-18 ENCOUNTER — HOSPITAL ENCOUNTER (OUTPATIENT)
Dept: MAMMOGRAPHY | Age: 73
Discharge: HOME | End: 2018-07-18
Attending: NURSE PRACTITIONER | Admitting: NURSE PRACTITIONER

## 2018-07-18 DIAGNOSIS — Z12.31 VISIT FOR SCREENING MAMMOGRAM: ICD-10-CM

## 2018-08-07 ENCOUNTER — HOSPITAL ENCOUNTER (OUTPATIENT)
Dept: OTHER | Age: 73
Discharge: HOME | End: 2018-08-07
Attending: INTERNAL MEDICINE | Admitting: INTERNAL MEDICINE

## 2018-08-07 LAB
ALBUMIN SERPL-MCNC: 4.1 GM/DL (ref 3.4–5)
ALP BLD-CCNC: 161 IU/L (ref 40–129)
ALT SERPL-CCNC: 23 U/L (ref 10–40)
ANION GAP SERPL CALCULATED.3IONS-SCNC: 13 MMOL/L (ref 4–16)
AST SERPL-CCNC: 29 IU/L (ref 15–37)
BILIRUB SERPL-MCNC: 0.4 MG/DL (ref 0–1)
BUN BLDV-MCNC: 11 MG/DL (ref 6–23)
CALCIUM SERPL-MCNC: 9.6 MG/DL (ref 8.3–10.6)
CEA: 1.9 NG/ML
CHLORIDE BLD-SCNC: 103 MMOL/L (ref 99–110)
CO2: 29 MMOL/L (ref 21–32)
CREAT SERPL-MCNC: 0.8 MG/DL (ref 0.6–1.1)
GFR AFRICAN AMERICAN: >60 ML/MIN/1.73M2
GFR NON-AFRICAN AMERICAN: >60 ML/MIN/1.73M2
GLUCOSE BLD-MCNC: 105 MG/DL (ref 70–99)
POTASSIUM SERPL-SCNC: 3.4 MMOL/L (ref 3.5–5.1)
SODIUM BLD-SCNC: 145 MMOL/L (ref 135–145)
TOTAL PROTEIN: 6.8 GM/DL (ref 6.4–8.2)

## 2018-09-06 ENCOUNTER — OFFICE VISIT (OUTPATIENT)
Dept: PULMONOLOGY | Age: 73
End: 2018-09-06

## 2018-09-06 VITALS
WEIGHT: 150 LBS | HEIGHT: 61 IN | SYSTOLIC BLOOD PRESSURE: 124 MMHG | BODY MASS INDEX: 28.32 KG/M2 | HEART RATE: 82 BPM | OXYGEN SATURATION: 92 % | DIASTOLIC BLOOD PRESSURE: 70 MMHG

## 2018-09-06 DIAGNOSIS — J90 PLEURAL EFFUSION, RIGHT: ICD-10-CM

## 2018-09-06 DIAGNOSIS — J44.9 COPD, MILD (HCC): Primary | ICD-10-CM

## 2018-09-06 PROCEDURE — 4040F PNEUMOC VAC/ADMIN/RCVD: CPT | Performed by: INTERNAL MEDICINE

## 2018-09-06 PROCEDURE — G8427 DOCREV CUR MEDS BY ELIG CLIN: HCPCS | Performed by: INTERNAL MEDICINE

## 2018-09-06 PROCEDURE — G8926 SPIRO NO PERF OR DOC: HCPCS | Performed by: INTERNAL MEDICINE

## 2018-09-06 PROCEDURE — 1036F TOBACCO NON-USER: CPT | Performed by: INTERNAL MEDICINE

## 2018-09-06 PROCEDURE — 1090F PRES/ABSN URINE INCON ASSESS: CPT | Performed by: INTERNAL MEDICINE

## 2018-09-06 PROCEDURE — 1101F PT FALLS ASSESS-DOCD LE1/YR: CPT | Performed by: INTERNAL MEDICINE

## 2018-09-06 PROCEDURE — G8400 PT W/DXA NO RESULTS DOC: HCPCS | Performed by: INTERNAL MEDICINE

## 2018-09-06 PROCEDURE — 3023F SPIROM DOC REV: CPT | Performed by: INTERNAL MEDICINE

## 2018-09-06 PROCEDURE — 3017F COLORECTAL CA SCREEN DOC REV: CPT | Performed by: INTERNAL MEDICINE

## 2018-09-06 PROCEDURE — 1123F ACP DISCUSS/DSCN MKR DOCD: CPT | Performed by: INTERNAL MEDICINE

## 2018-09-06 PROCEDURE — G8599 NO ASA/ANTIPLAT THER USE RNG: HCPCS | Performed by: INTERNAL MEDICINE

## 2018-09-06 PROCEDURE — 99213 OFFICE O/P EST LOW 20 MIN: CPT | Performed by: INTERNAL MEDICINE

## 2018-09-06 PROCEDURE — G8419 CALC BMI OUT NRM PARAM NOF/U: HCPCS | Performed by: INTERNAL MEDICINE

## 2018-09-06 RX ORDER — DULOXETIN HYDROCHLORIDE 60 MG/1
60 CAPSULE, DELAYED RELEASE ORAL DAILY
COMMUNITY
End: 2018-12-04 | Stop reason: ALTCHOICE

## 2018-10-09 ENCOUNTER — NURSE ONLY (OUTPATIENT)
Dept: PULMONOLOGY | Age: 73
End: 2018-10-09
Payer: MEDICARE

## 2018-10-09 DIAGNOSIS — J44.9 COPD, MILD (HCC): ICD-10-CM

## 2018-10-09 LAB
DLCO %PRED: NORMAL
DLCO PRE: NORMAL
FEF 25-75%-POST: 1.1
FEF 25-75%-PRE: 1.16
FEV1-POST: 1.48
FEV1-PRE: 1.65
FEV1/FVC-POST: 74.2
FEV1/FVC-PRE: 71.1
FVC-POST: 2
FVC-PRE: 2.32
MEP: NORMAL
MIP: NORMAL
TLC %PRED: NORMAL
TLC PRE: NORMAL

## 2018-10-09 PROCEDURE — 94060 EVALUATION OF WHEEZING: CPT | Performed by: INTERNAL MEDICINE

## 2018-10-09 ASSESSMENT — PULMONARY FUNCTION TESTS
FEV1/FVC_PRE: 71.1
FVC_PRE: 2.32
FEV1_POST: 1.48
FEV1/FVC_POST: 74.2
FEV1_PRE: 1.65
FVC_POST: 2.00

## 2018-10-15 ENCOUNTER — HOSPITAL ENCOUNTER (OUTPATIENT)
Dept: CT IMAGING | Age: 73
Discharge: HOME | End: 2018-10-15
Payer: MEDICARE

## 2018-10-15 DIAGNOSIS — C18.7 CANCER OF SIGMOID COLON (HCC): ICD-10-CM

## 2018-10-15 LAB
GFR AFRICAN AMERICAN: >60 ML/MIN/1.73M2
GFR NON-AFRICAN AMERICAN: >60 ML/MIN/1.73M2
POC CREATININE: 0.8 MG/DL (ref 0.6–1.1)

## 2018-10-15 PROCEDURE — 74177 CT ABD & PELVIS W/CONTRAST: CPT

## 2018-10-15 PROCEDURE — 6360000004 HC RX CONTRAST MEDICATION: Performed by: INTERNAL MEDICINE

## 2018-10-15 PROCEDURE — 71260 CT THORAX DX C+: CPT

## 2018-10-15 RX ADMIN — IOPAMIDOL 75 ML: 755 INJECTION, SOLUTION INTRAVENOUS at 12:01

## 2018-10-15 RX ADMIN — IOHEXOL 50 ML: 240 INJECTION, SOLUTION INTRATHECAL; INTRAVASCULAR; INTRAVENOUS; ORAL at 10:46

## 2018-11-05 ENCOUNTER — TELEPHONE (OUTPATIENT)
Dept: SURGERY | Age: 73
End: 2018-11-05

## 2018-11-05 DIAGNOSIS — K56.609 OBSTRUCTION OF DESCENDING COLON (HCC): Primary | ICD-10-CM

## 2018-11-06 RX ORDER — OSTOMY SUPPLY 1 3/4"
EACH MISCELLANEOUS
Qty: 10 EACH | Refills: 11 | Status: SHIPPED | OUTPATIENT
Start: 2018-11-06 | End: 2019-05-16 | Stop reason: SDUPTHER

## 2018-12-04 ENCOUNTER — OFFICE VISIT (OUTPATIENT)
Dept: CARDIOLOGY CLINIC | Age: 73
End: 2018-12-04
Payer: MEDICARE

## 2018-12-04 VITALS
DIASTOLIC BLOOD PRESSURE: 84 MMHG | BODY MASS INDEX: 25.66 KG/M2 | HEIGHT: 65 IN | RESPIRATION RATE: 16 BRPM | SYSTOLIC BLOOD PRESSURE: 162 MMHG | HEART RATE: 68 BPM | WEIGHT: 154 LBS

## 2018-12-04 DIAGNOSIS — I10 ESSENTIAL HYPERTENSION: ICD-10-CM

## 2018-12-04 DIAGNOSIS — E78.2 MIXED HYPERLIPIDEMIA: ICD-10-CM

## 2018-12-04 DIAGNOSIS — Z95.1 S/P CABG X 1: Primary | ICD-10-CM

## 2018-12-04 DIAGNOSIS — Z95.2 AORTIC VALVE PROSTHESIS PRESENT: ICD-10-CM

## 2018-12-04 PROCEDURE — 3017F COLORECTAL CA SCREEN DOC REV: CPT | Performed by: INTERNAL MEDICINE

## 2018-12-04 PROCEDURE — 1036F TOBACCO NON-USER: CPT | Performed by: INTERNAL MEDICINE

## 2018-12-04 PROCEDURE — G8598 ASA/ANTIPLAT THER USED: HCPCS | Performed by: INTERNAL MEDICINE

## 2018-12-04 PROCEDURE — 99214 OFFICE O/P EST MOD 30 MIN: CPT | Performed by: INTERNAL MEDICINE

## 2018-12-04 PROCEDURE — 4040F PNEUMOC VAC/ADMIN/RCVD: CPT | Performed by: INTERNAL MEDICINE

## 2018-12-04 PROCEDURE — 1123F ACP DISCUSS/DSCN MKR DOCD: CPT | Performed by: INTERNAL MEDICINE

## 2018-12-04 PROCEDURE — 1101F PT FALLS ASSESS-DOCD LE1/YR: CPT | Performed by: INTERNAL MEDICINE

## 2018-12-04 PROCEDURE — G8427 DOCREV CUR MEDS BY ELIG CLIN: HCPCS | Performed by: INTERNAL MEDICINE

## 2018-12-04 PROCEDURE — G8419 CALC BMI OUT NRM PARAM NOF/U: HCPCS | Performed by: INTERNAL MEDICINE

## 2018-12-04 PROCEDURE — 1090F PRES/ABSN URINE INCON ASSESS: CPT | Performed by: INTERNAL MEDICINE

## 2018-12-04 PROCEDURE — G8484 FLU IMMUNIZE NO ADMIN: HCPCS | Performed by: INTERNAL MEDICINE

## 2018-12-04 PROCEDURE — G8400 PT W/DXA NO RESULTS DOC: HCPCS | Performed by: INTERNAL MEDICINE

## 2018-12-04 RX ORDER — ASPIRIN 325 MG
325 TABLET ORAL DAILY
Status: ON HOLD | COMMUNITY
End: 2020-01-07 | Stop reason: SDUPTHER

## 2019-02-01 LAB
ALBUMIN SERPL-MCNC: 4.3 G/DL
ALP BLD-CCNC: 135 U/L
ALT SERPL-CCNC: 27 U/L
ANION GAP SERPL CALCULATED.3IONS-SCNC: NORMAL MMOL/L
AST SERPL-CCNC: 33 U/L
BILIRUB SERPL-MCNC: 0.6 MG/DL (ref 0.1–1.4)
BUN BLDV-MCNC: 14 MG/DL
CALCIUM SERPL-MCNC: NORMAL MG/DL
CHLORIDE BLD-SCNC: 104 MMOL/L
CHOLESTEROL, TOTAL: 179 MG/DL
CHOLESTEROL/HDL RATIO: NORMAL
CO2: NORMAL MMOL/L
CREAT SERPL-MCNC: 0.9 MG/DL
GFR CALCULATED: NORMAL
GLUCOSE BLD-MCNC: 92 MG/DL
HDLC SERPL-MCNC: 49 MG/DL (ref 35–70)
LDL CHOLESTEROL CALCULATED: 88 MG/DL (ref 0–160)
POTASSIUM SERPL-SCNC: 3.6 MMOL/L
SODIUM BLD-SCNC: 143 MMOL/L
TOTAL PROTEIN: NORMAL
TRIGL SERPL-MCNC: 208 MG/DL
VLDLC SERPL CALC-MCNC: NORMAL MG/DL

## 2019-03-07 ENCOUNTER — OFFICE VISIT (OUTPATIENT)
Dept: PULMONOLOGY | Age: 74
End: 2019-03-07
Payer: MEDICARE

## 2019-03-07 VITALS
SYSTOLIC BLOOD PRESSURE: 120 MMHG | OXYGEN SATURATION: 96 % | HEIGHT: 65 IN | WEIGHT: 161.8 LBS | HEART RATE: 77 BPM | BODY MASS INDEX: 26.96 KG/M2 | DIASTOLIC BLOOD PRESSURE: 60 MMHG

## 2019-03-07 DIAGNOSIS — J90 PLEURAL EFFUSION, RIGHT: ICD-10-CM

## 2019-03-07 DIAGNOSIS — J44.9 COPD, MILD (HCC): Primary | ICD-10-CM

## 2019-03-07 DIAGNOSIS — R91.8 MULTIPLE LUNG NODULES ON CT: ICD-10-CM

## 2019-03-07 DIAGNOSIS — C18.7 MALIGNANT NEOPLASM OF SIGMOID COLON (HCC): ICD-10-CM

## 2019-03-07 PROCEDURE — G8419 CALC BMI OUT NRM PARAM NOF/U: HCPCS | Performed by: INTERNAL MEDICINE

## 2019-03-07 PROCEDURE — G8598 ASA/ANTIPLAT THER USED: HCPCS | Performed by: INTERNAL MEDICINE

## 2019-03-07 PROCEDURE — G8926 SPIRO NO PERF OR DOC: HCPCS | Performed by: INTERNAL MEDICINE

## 2019-03-07 PROCEDURE — G8427 DOCREV CUR MEDS BY ELIG CLIN: HCPCS | Performed by: INTERNAL MEDICINE

## 2019-03-07 PROCEDURE — 1101F PT FALLS ASSESS-DOCD LE1/YR: CPT | Performed by: INTERNAL MEDICINE

## 2019-03-07 PROCEDURE — G8484 FLU IMMUNIZE NO ADMIN: HCPCS | Performed by: INTERNAL MEDICINE

## 2019-03-07 PROCEDURE — 3017F COLORECTAL CA SCREEN DOC REV: CPT | Performed by: INTERNAL MEDICINE

## 2019-03-07 PROCEDURE — 99213 OFFICE O/P EST LOW 20 MIN: CPT | Performed by: INTERNAL MEDICINE

## 2019-03-07 PROCEDURE — G8400 PT W/DXA NO RESULTS DOC: HCPCS | Performed by: INTERNAL MEDICINE

## 2019-03-07 PROCEDURE — 1123F ACP DISCUSS/DSCN MKR DOCD: CPT | Performed by: INTERNAL MEDICINE

## 2019-03-07 PROCEDURE — 1090F PRES/ABSN URINE INCON ASSESS: CPT | Performed by: INTERNAL MEDICINE

## 2019-03-07 PROCEDURE — 4040F PNEUMOC VAC/ADMIN/RCVD: CPT | Performed by: INTERNAL MEDICINE

## 2019-03-07 PROCEDURE — 3023F SPIROM DOC REV: CPT | Performed by: INTERNAL MEDICINE

## 2019-03-07 PROCEDURE — 1036F TOBACCO NON-USER: CPT | Performed by: INTERNAL MEDICINE

## 2019-04-10 ENCOUNTER — HOSPITAL ENCOUNTER (OUTPATIENT)
Age: 74
Discharge: HOME | End: 2019-04-10
Payer: MEDICARE

## 2019-04-10 ENCOUNTER — HOSPITAL ENCOUNTER (OUTPATIENT)
Dept: CT IMAGING | Age: 74
Discharge: HOME | End: 2019-04-10
Payer: MEDICARE

## 2019-04-10 DIAGNOSIS — C18.9 MALIGNANT NEOPLASM OF COLON, UNSPECIFIED PART OF COLON (HCC): ICD-10-CM

## 2019-04-10 LAB
ALBUMIN SERPL-MCNC: 4.3 GM/DL (ref 3.4–5)
ALP BLD-CCNC: 158 IU/L (ref 40–129)
ALT SERPL-CCNC: 17 U/L (ref 10–40)
ANION GAP SERPL CALCULATED.3IONS-SCNC: 13 MMOL/L (ref 4–16)
AST SERPL-CCNC: 25 IU/L (ref 15–37)
BASOPHILS ABSOLUTE: 0.1 K/CU MM
BASOPHILS RELATIVE PERCENT: 0.7 % (ref 0–1)
BILIRUB SERPL-MCNC: 0.5 MG/DL (ref 0–1)
BUN BLDV-MCNC: 11 MG/DL (ref 6–23)
CALCIUM SERPL-MCNC: 9.9 MG/DL (ref 8.3–10.6)
CEA: 52.9 NG/ML
CHLORIDE BLD-SCNC: 104 MMOL/L (ref 99–110)
CO2: 30 MMOL/L (ref 21–32)
CREAT SERPL-MCNC: 1 MG/DL (ref 0.6–1.1)
DIFFERENTIAL TYPE: ABNORMAL
EOSINOPHILS ABSOLUTE: 0.4 K/CU MM
EOSINOPHILS RELATIVE PERCENT: 6 % (ref 0–3)
GFR AFRICAN AMERICAN: >60 ML/MIN/1.73M2
GFR AFRICAN AMERICAN: >60 ML/MIN/1.73M2
GFR NON-AFRICAN AMERICAN: 53 ML/MIN/1.73M2
GFR NON-AFRICAN AMERICAN: 55 ML/MIN/1.73M2
GLUCOSE FASTING: 112 MG/DL (ref 70–99)
HCT VFR BLD CALC: 41.2 % (ref 37–47)
HEMOGLOBIN: 12.8 GM/DL (ref 12.5–16)
IMMATURE NEUTROPHIL %: 0.1 % (ref 0–0.43)
LYMPHOCYTES ABSOLUTE: 1.9 K/CU MM
LYMPHOCYTES RELATIVE PERCENT: 26.9 % (ref 24–44)
MCH RBC QN AUTO: 27 PG (ref 27–31)
MCHC RBC AUTO-ENTMCNC: 31.1 % (ref 32–36)
MCV RBC AUTO: 86.9 FL (ref 78–100)
MONOCYTES ABSOLUTE: 0.8 K/CU MM
MONOCYTES RELATIVE PERCENT: 11.5 % (ref 0–4)
PDW BLD-RTO: 13.8 % (ref 11.7–14.9)
PLATELET # BLD: 234 K/CU MM (ref 140–440)
PMV BLD AUTO: 11.2 FL (ref 7.5–11.1)
POC CREATININE: 1 MG/DL (ref 0.6–1.1)
POTASSIUM SERPL-SCNC: 3.4 MMOL/L (ref 3.5–5.1)
RBC # BLD: 4.74 M/CU MM (ref 4.2–5.4)
SEGMENTED NEUTROPHILS ABSOLUTE COUNT: 3.8 K/CU MM
SEGMENTED NEUTROPHILS RELATIVE PERCENT: 54.8 % (ref 36–66)
SODIUM BLD-SCNC: 147 MMOL/L (ref 135–145)
TOTAL IMMATURE NEUTOROPHIL: 0.01 K/CU MM
TOTAL PROTEIN: 7.9 GM/DL (ref 6.4–8.2)
WBC # BLD: 7 K/CU MM (ref 4–10.5)

## 2019-04-10 PROCEDURE — 80053 COMPREHEN METABOLIC PANEL: CPT

## 2019-04-10 PROCEDURE — 6360000004 HC RX CONTRAST MEDICATION: Performed by: INTERNAL MEDICINE

## 2019-04-10 PROCEDURE — 36415 COLL VENOUS BLD VENIPUNCTURE: CPT

## 2019-04-10 PROCEDURE — 82378 CARCINOEMBRYONIC ANTIGEN: CPT

## 2019-04-10 PROCEDURE — 85025 COMPLETE CBC W/AUTO DIFF WBC: CPT

## 2019-04-10 PROCEDURE — 71260 CT THORAX DX C+: CPT

## 2019-04-10 RX ADMIN — IOPAMIDOL 75 ML: 755 INJECTION, SOLUTION INTRAVENOUS at 11:04

## 2019-04-23 RX ORDER — SODIUM CHLORIDE 0.9 % (FLUSH) 0.9 %
10 SYRINGE (ML) INJECTION PRN
Status: CANCELLED | OUTPATIENT
Start: 2019-04-23

## 2019-04-24 ENCOUNTER — HOSPITAL ENCOUNTER (OUTPATIENT)
Dept: CT IMAGING | Age: 74
Discharge: HOME | End: 2019-04-24
Payer: MEDICARE

## 2019-04-24 ENCOUNTER — HOSPITAL ENCOUNTER (OUTPATIENT)
Dept: GENERAL RADIOLOGY | Age: 74
Discharge: HOME | End: 2019-04-24
Payer: MEDICARE

## 2019-04-24 VITALS
BODY MASS INDEX: 26.33 KG/M2 | TEMPERATURE: 97 F | SYSTOLIC BLOOD PRESSURE: 143 MMHG | DIASTOLIC BLOOD PRESSURE: 70 MMHG | HEIGHT: 65 IN | WEIGHT: 158 LBS | HEART RATE: 68 BPM | OXYGEN SATURATION: 97 % | RESPIRATION RATE: 16 BRPM

## 2019-04-24 DIAGNOSIS — R91.8 LUNG MASS: ICD-10-CM

## 2019-04-24 LAB
APTT: 25.9 SECONDS (ref 21.2–33)
INR BLD: 0.96 INDEX
PROTHROMBIN TIME: 11.1 SECONDS (ref 9.12–12.5)

## 2019-04-24 PROCEDURE — 71045 X-RAY EXAM CHEST 1 VIEW: CPT

## 2019-04-24 PROCEDURE — 32405 CT NEEDLE BIOPSY LUNG PERCUTANEOUS: CPT

## 2019-04-24 PROCEDURE — 88305 TISSUE EXAM BY PATHOLOGIST: CPT

## 2019-04-24 PROCEDURE — 85730 THROMBOPLASTIN TIME PARTIAL: CPT

## 2019-04-24 PROCEDURE — 7100000010 HC PHASE II RECOVERY - FIRST 15 MIN

## 2019-04-24 PROCEDURE — 88334 PATH CONSLTJ SURG CYTO XM EA: CPT

## 2019-04-24 PROCEDURE — 88333 PATH CONSLTJ SURG CYTO XM 1: CPT

## 2019-04-24 PROCEDURE — 88342 IMHCHEM/IMCYTCHM 1ST ANTB: CPT

## 2019-04-24 PROCEDURE — 2709999900 HC NON-CHARGEABLE SUPPLY

## 2019-04-24 PROCEDURE — 88341 IMHCHEM/IMCYTCHM EA ADD ANTB: CPT

## 2019-04-24 PROCEDURE — 7100000011 HC PHASE II RECOVERY - ADDTL 15 MIN

## 2019-04-24 PROCEDURE — 85610 PROTHROMBIN TIME: CPT

## 2019-04-24 RX ORDER — SODIUM CHLORIDE 0.9 % (FLUSH) 0.9 %
10 SYRINGE (ML) INJECTION PRN
Status: DISCONTINUED | OUTPATIENT
Start: 2019-04-24 | End: 2019-04-25 | Stop reason: HOSPADM

## 2019-04-24 ASSESSMENT — PAIN SCALES - GENERAL: PAINLEVEL_OUTOF10: 0

## 2019-04-24 ASSESSMENT — PAIN - FUNCTIONAL ASSESSMENT: PAIN_FUNCTIONAL_ASSESSMENT: 0-10

## 2019-04-24 NOTE — H&P
facility-administered medications on file prior to encounter. Vital Signs:  @FLOWDT(6:last)@ @FLOWSTATM(6:24)@ @FLOWDT(5:last)@ @FLOWDT(8:last)@ @FLOWDT(9:last)@ @FLOWDT(10:last)@   @FLOWDT(14:first)@  @FLOWDT(14:last)@  There is no height or weight on file to calculate BMI. Laboratory:  No results for input(s): WBC, HEMOGLOBIN, NA, CL, CO2, BUN, CREATININE, GLUCOSE, INR, PTT, CKMB in the last 72 hours. Invalid input(s): HEMATOCRIT, PLATELETS, POTASSIUM, CA, PT, CK1, TROP  INR @LABR24(INR)@    Physical Exam:  GENERAL:Well developed, well nourished in NAD        Chest: CTA    Heart: S1, S1    Impression:  Active Problems:    * No active hospital problems. *  Resolved Problems:    * No resolved hospital problems.  *          Mallampati Score 2  ASA class 2    PLAN OF CARE/PLANNED PROCEDURE    CT guided lung nudule biopsy

## 2019-05-14 DIAGNOSIS — K56.609 OBSTRUCTION OF DESCENDING COLON (HCC): ICD-10-CM

## 2019-05-16 RX ORDER — OSTOMY SUPPLY 1 3/4"
EACH MISCELLANEOUS
Qty: 10 EACH | Refills: 11 | Status: SHIPPED | OUTPATIENT
Start: 2019-05-16

## 2019-05-20 ENCOUNTER — HOSPITAL ENCOUNTER (OUTPATIENT)
Age: 74
Setting detail: SPECIMEN
Discharge: HOME | End: 2019-05-20
Payer: MEDICARE

## 2019-05-20 LAB
ALBUMIN SERPL-MCNC: 4.4 GM/DL (ref 3.4–5)
ALP BLD-CCNC: 176 IU/L (ref 40–128)
ALT SERPL-CCNC: 14 U/L (ref 10–40)
ANION GAP SERPL CALCULATED.3IONS-SCNC: 13 MMOL/L (ref 4–16)
AST SERPL-CCNC: 22 IU/L (ref 15–37)
BILIRUB SERPL-MCNC: 0.5 MG/DL (ref 0–1)
BUN BLDV-MCNC: 17 MG/DL (ref 6–23)
CALCIUM SERPL-MCNC: 9.7 MG/DL (ref 8.3–10.6)
CHLORIDE BLD-SCNC: 103 MMOL/L (ref 99–110)
CO2: 28 MMOL/L (ref 21–32)
CREAT SERPL-MCNC: 1.3 MG/DL (ref 0.6–1.1)
GFR AFRICAN AMERICAN: 49 ML/MIN/1.73M2
GFR NON-AFRICAN AMERICAN: 40 ML/MIN/1.73M2
GLUCOSE BLD-MCNC: 90 MG/DL (ref 70–99)
POTASSIUM SERPL-SCNC: 3.7 MMOL/L (ref 3.5–5.1)
SODIUM BLD-SCNC: 144 MMOL/L (ref 135–145)
TOTAL PROTEIN: 7 GM/DL (ref 6.4–8.2)

## 2019-05-20 PROCEDURE — 80053 COMPREHEN METABOLIC PANEL: CPT

## 2019-05-29 ENCOUNTER — PROCEDURE VISIT (OUTPATIENT)
Dept: CARDIOLOGY CLINIC | Age: 74
End: 2019-05-29
Payer: MEDICARE

## 2019-05-29 DIAGNOSIS — Z95.2 AORTIC VALVE PROSTHESIS PRESENT: Primary | ICD-10-CM

## 2019-05-29 LAB
LV EF: 58 %
LVEF MODALITY: NORMAL

## 2019-05-29 PROCEDURE — 93306 TTE W/DOPPLER COMPLETE: CPT | Performed by: INTERNAL MEDICINE

## 2019-06-03 ENCOUNTER — TELEPHONE (OUTPATIENT)
Dept: CARDIOLOGY CLINIC | Age: 74
End: 2019-06-03

## 2019-06-03 NOTE — TELEPHONE ENCOUNTER
Tried to call patient w/echo results; L/M for patient to call back. Echocardiogram 5/29/19:  LV function and size are normal, Ejection Fraction 55-60 %. Mild asymetric left ventricular hypertrophy of the septal wall. No regional wall motion abnormalities were detected. Diastolic Dysfunction Grade I . All chamber dimensions are within normal limits. Possible stenosis of the bioprosthetic valve in aortic position with a mean pressure of 23 mmHg. Mild tricuspid regurgitation. RVSP= 26 mmHg. No evidence of pericardial effusion. When compared to previous echo prosthetic aortic valve mean pressure shows slight decrease from 25mmHg to  23mmHg.

## 2019-06-18 ENCOUNTER — HOSPITAL ENCOUNTER (OUTPATIENT)
Age: 74
Setting detail: SPECIMEN
Discharge: HOME | End: 2019-06-18
Payer: MEDICARE

## 2019-06-18 ENCOUNTER — OFFICE VISIT (OUTPATIENT)
Dept: CARDIOLOGY CLINIC | Age: 74
End: 2019-06-18
Payer: MEDICARE

## 2019-06-18 VITALS
RESPIRATION RATE: 14 BRPM | HEART RATE: 80 BPM | WEIGHT: 160 LBS | HEIGHT: 65 IN | SYSTOLIC BLOOD PRESSURE: 122 MMHG | BODY MASS INDEX: 26.66 KG/M2 | DIASTOLIC BLOOD PRESSURE: 72 MMHG

## 2019-06-18 DIAGNOSIS — Z95.1 S/P CABG X 1: Primary | ICD-10-CM

## 2019-06-18 DIAGNOSIS — I10 ESSENTIAL HYPERTENSION: ICD-10-CM

## 2019-06-18 DIAGNOSIS — C18.7 MALIGNANT NEOPLASM OF SIGMOID COLON (HCC): ICD-10-CM

## 2019-06-18 DIAGNOSIS — Z95.2 AORTIC VALVE PROSTHESIS PRESENT: ICD-10-CM

## 2019-06-18 DIAGNOSIS — E78.2 MIXED HYPERLIPIDEMIA: ICD-10-CM

## 2019-06-18 LAB
ALBUMIN SERPL-MCNC: 3.7 GM/DL (ref 3.4–5)
ALP BLD-CCNC: 149 IU/L (ref 40–128)
ALT SERPL-CCNC: 42 U/L (ref 10–40)
ANION GAP SERPL CALCULATED.3IONS-SCNC: 14 MMOL/L (ref 4–16)
AST SERPL-CCNC: 39 IU/L (ref 15–37)
BILIRUB SERPL-MCNC: 0.4 MG/DL (ref 0–1)
BUN BLDV-MCNC: 10 MG/DL (ref 6–23)
CALCIUM SERPL-MCNC: 9.1 MG/DL (ref 8.3–10.6)
CEA: 8.5 NG/ML
CHLORIDE BLD-SCNC: 104 MMOL/L (ref 99–110)
CO2: 28 MMOL/L (ref 21–32)
CREAT SERPL-MCNC: 0.9 MG/DL (ref 0.6–1.1)
GFR AFRICAN AMERICAN: >60 ML/MIN/1.73M2
GFR NON-AFRICAN AMERICAN: >60 ML/MIN/1.73M2
GLUCOSE BLD-MCNC: 128 MG/DL (ref 70–99)
POTASSIUM SERPL-SCNC: 3.8 MMOL/L (ref 3.5–5.1)
SODIUM BLD-SCNC: 146 MMOL/L (ref 135–145)
TOTAL PROTEIN: 6 GM/DL (ref 6.4–8.2)

## 2019-06-18 PROCEDURE — G8400 PT W/DXA NO RESULTS DOC: HCPCS | Performed by: INTERNAL MEDICINE

## 2019-06-18 PROCEDURE — 3017F COLORECTAL CA SCREEN DOC REV: CPT | Performed by: INTERNAL MEDICINE

## 2019-06-18 PROCEDURE — 99214 OFFICE O/P EST MOD 30 MIN: CPT | Performed by: INTERNAL MEDICINE

## 2019-06-18 PROCEDURE — 1036F TOBACCO NON-USER: CPT | Performed by: INTERNAL MEDICINE

## 2019-06-18 PROCEDURE — 1123F ACP DISCUSS/DSCN MKR DOCD: CPT | Performed by: INTERNAL MEDICINE

## 2019-06-18 PROCEDURE — G8598 ASA/ANTIPLAT THER USED: HCPCS | Performed by: INTERNAL MEDICINE

## 2019-06-18 PROCEDURE — G8427 DOCREV CUR MEDS BY ELIG CLIN: HCPCS | Performed by: INTERNAL MEDICINE

## 2019-06-18 PROCEDURE — 4040F PNEUMOC VAC/ADMIN/RCVD: CPT | Performed by: INTERNAL MEDICINE

## 2019-06-18 PROCEDURE — 82378 CARCINOEMBRYONIC ANTIGEN: CPT

## 2019-06-18 PROCEDURE — G8419 CALC BMI OUT NRM PARAM NOF/U: HCPCS | Performed by: INTERNAL MEDICINE

## 2019-06-18 PROCEDURE — 1090F PRES/ABSN URINE INCON ASSESS: CPT | Performed by: INTERNAL MEDICINE

## 2019-06-18 PROCEDURE — 80053 COMPREHEN METABOLIC PANEL: CPT

## 2019-06-18 NOTE — PATIENT INSTRUCTIONS
Continue current cardiovascular medications which have been reviewed and discussed individually with you. Appropriate prescriptions if needed on this visit are addressed. After visit summery is provided. Questions answered and patient verbalizes understanding. Follow up in office in 12 months with ECG, sooner if needed.

## 2019-06-18 NOTE — PROGRESS NOTES
Lisa Pederson  1945  Geremias Lyn MD    Chief complaint and HPI:  Lisa Pederson 73-year-old female follows up for valvular heart disease status post aortic valve replacement and for coronary artery disease status post bypass surgery. She has hyper lipidemia. She denies any chest pain or shortness of breath. Her CAT scan of chest in April showed metastatic disease and the needle biopsy confirmed adenocarcinoma from colon cancer. She is receiving a second round of chemotherapy for this which she seems to be tolerating it well. She gets every other weeks and follows up with Dr. Nani Hughes.  Rest of the Cardiovascular system review is otherwise unchanged from prior encounter. Past medical history:  has a past medical history of Abnormal CT scan, Abnormal finding on EKG, Acid reflux, Aortic stenosis, Aortic valve prosthesis present, AR (aortic regurgitation), Arthritis, CAD (coronary artery disease), Cancer ([address] Colostomy care ([address] COPD, mild ([address] Diabetes mellitus ([address] Family history of coronary artery disease, Fatigue, H/O 24 hour EKG monitoring, H/O cardiovascular stress test, H/O echocardiogram, Heart murmur, [place] (hard of hearing), Hyperlipidemia, Hypertension, MR (congenital mitral regurgitation), Multiple lung nodules on CT, Nocturnal hypoxemia, Pleural effusion, Pneumonia, Prolonged emergence from general anesthesia, S/P CABG x 1, Teeth missing, and Wears glasses. Past surgical history:  has a past surgical history that includes Dental surgery; Breast lumpectomy (Right, 1980's); Coronary artery bypass graft (7/30/14); Diagnostic Cardiac Cath Lab Procedure (7/28/14); Abdomen surgery; other surgical history (10/07/2017); Cardiac valve replacement (7/30/14); Tunneled venous port placement (1025/2017); and Hysterectomy, total abdominal (1980's).   Social History:   Social History     Tobacco Use    Smoking status: Never Smoker    Smokeless tobacco: Never Used    Tobacco comment: Reviewed Substance Use Topics    Alcohol use: No     Family history: family history includes Arthritis in her mother; Cancer in her father; Depression in her mother; Hearing Loss in her mother; Heart Disease in her brother; Migraines in her son. ALLERGIES:  Prilosec [omeprazole] and Pravachol [pravastatin sodium]  Prior to Admission medications    Medication Sig Start Date End Date Taking? Authorizing Provider   Ostomy Supplies (OSTOMY DRAINABLE POUCH/FLANGE) MISC 1 device replaced twice weekly prn 5/16/19  Yes Mikie Roberts MD   Apoaequorin (PREVAGEN PO) Take by mouth   Yes Historical Provider, MD   aspirin 325 MG tablet Take 325 mg by mouth daily   Yes Historical Provider, MD   Potassium 99 MG TABS Take 99 mg by mouth daily   Yes Historical Provider, MD   Multiple Vitamins-Minerals (MULTIVITAMIN PO) Take by mouth daily   Yes Historical Provider, MD   simvastatin (ZOCOR) 40 MG tablet Take 40 mg by mouth nightly. Yes Historical Provider, MD   Calcium Carbonate-Vitamin D (CALCIUM + D) 600-200 MG-UNIT TABS Take 1 tablet by mouth every morning. Over The Counter   Yes Historical Provider, MD     Vitals:    06/18/19 1553   BP: 122/72   Site: Left Upper Arm   Position: Sitting   Cuff Size: Medium Adult   Pulse: 80   Resp: 14   Weight: 160 lb (72.6 kg)   Height: 5' 5\" (1.651 m)      Body mass index is 26.63 kg/m². Wt Readings from Last 3 Encounters:   06/18/19 160 lb (72.6 kg)   04/24/19 158 lb (71.7 kg)   04/23/19 158 lb (71.7 kg)     Constitutional: Normally built and nourished pleasant female in no apparent distress.  She gained 6 pounds since last visit . Eyes: Wears glasses.  Pupils are equal.  Conjunctiva pallor noted. ENT: She is hard of hearing is not having any hearing aids  NECK: No JVP or thyromegaly  Cardiovascular:  Auscultation: Normal S1 and S2. 2/6 systolic ejection murmur noted in the aortic area and left sternal border.   Carotids negative for bruits.  Abdominal aorta is normal.  No epigastric bruit noted.  Peripheral pulses: 1+ equal in both feet  Respiratory:  Respiratory effort is normal  Breath sounds are clear to auscultation  Extremities:  No edema, clubbing,  Cyanosis, petechiae. SKIN: Warm and well perfused, no pallor or cyanosis  Abdomen:  No masses or tenderness. No organomegaly noted. Musculoskeletal:  No spinal deformities noted.  Gait is normal  Muscle strength is normal  Neurologic:  Oriented to time, place, and person and non-anxious. No focal neurological deficit   Hearing is impaired. Echo 5/29/2019 reported    LV function and size are normal, Ejection Fraction 55-60 %. Mild asymetric left ventricular hypertrophy of the septal wall. No regional wall motion abnormalities were detected. Diastolic Dysfunction Grade I . All chamber dimensions are within normal limits. Possible stenosis of the bioprosthetic valve in aortic position with a mean   pressure of 23 mmHg. Mild tricuspid regurgitation. RVSP= 26 mmHg. No evidence of pericardial effusion. When compared to previous echo prosthetic aortic valve mean pressure shows   slight decrease from 25mmHg to 23mmHg. 1. Increased size and number bilateral pulmonary metastases. 2. At least 4 liver lesions as above, at least 1 appearing unchanged since 07/28/2014. Metastatic disease, primary malignancy, and benign processes such as cysts and hemangioma should be considered.   Recommend further evaluation with liver protocol abdomen MRI (using a nonhepatobiliary contrast agent) or CT     LAB REVIEW:    CBC:   Lab Results   Component Value Date    WBC 7.0 04/10/2019    HGB 12.8 04/10/2019    HCT 41.2 04/10/2019     04/10/2019     Lipids:   Lab Results   Component Value Date    CHOL 179 02/01/2019    TRIG 208 02/01/2019    HDL 49 02/01/2019    LDLCALC 88 02/01/2019     Renal:   Lab Results   Component Value Date    BUN 10 06/18/2019    CREATININE 0.9 06/18/2019     06/18/2019    K 3.8 06/18/2019     PT/INR:   Lab Results   Component Value Date    INR 0.96 04/24/2019     IMPRESSION and RECOMMENDATIONS:      S/P CABG x 1  Clinically stable. Continue aggressive risk modification for secondary prevention. Hyperlipidemia  Last LDL was 88. Current statin therapy. Aortic valve prosthesis present  Functioning well. SBE prophylaxis consult. Continue follow-up. Malignant neoplasm of sigmoid colon [facility])  Patient to ask  the regard to chemotherapy she is on has any effect on her heart for example cardiomyopathy. If the answer is yes she needs to see me sooner with repeat echocardiogram in 4 to 6 months otherwise follow-up as scheduled. Continue current cardiovascular medications which have been reviewed and discussed individually with you. Appropriate prescriptions if needed on this visit are addressed. After visit summery is provided. Questions answered and patient verbalizes understanding. Follow up in office in 12 months with ECG, sooner if needed. Meli Sarabia MD, 6/18/2019 4:22 PM     Please note this report has been partially produced using speech recognition software and may contain errors related to that system including errors in grammar, punctuation, and spelling, as well as words and phrases that may be inappropriate. If there are any questions or concerns please feel free to contact the dictating provider for clarification.

## 2019-06-18 NOTE — ASSESSMENT & PLAN NOTE
Patient to ask  the regard to chemotherapy she is on has any effect on her heart for example cardiomyopathy. If the answer is yes she needs to see me sooner with repeat echocardiogram in 4 to 6 months otherwise follow-up as scheduled.

## 2019-07-02 ENCOUNTER — HOSPITAL ENCOUNTER (OUTPATIENT)
Age: 74
Setting detail: SPECIMEN
Discharge: HOME | End: 2019-07-02
Payer: MEDICARE

## 2019-07-02 LAB
ALBUMIN SERPL-MCNC: 3.7 GM/DL (ref 3.4–5)
ALP BLD-CCNC: 118 IU/L (ref 40–128)
ALT SERPL-CCNC: 23 U/L (ref 10–40)
ANION GAP SERPL CALCULATED.3IONS-SCNC: 9 MMOL/L (ref 4–16)
AST SERPL-CCNC: 30 IU/L (ref 15–37)
BILIRUB SERPL-MCNC: 0.3 MG/DL (ref 0–1)
BUN BLDV-MCNC: 13 MG/DL (ref 6–23)
CALCIUM SERPL-MCNC: 9.4 MG/DL (ref 8.3–10.6)
CHLORIDE BLD-SCNC: 109 MMOL/L (ref 99–110)
CO2: 31 MMOL/L (ref 21–32)
CREAT SERPL-MCNC: 0.8 MG/DL (ref 0.6–1.1)
GFR AFRICAN AMERICAN: >60 ML/MIN/1.73M2
GFR NON-AFRICAN AMERICAN: >60 ML/MIN/1.73M2
GLUCOSE BLD-MCNC: 109 MG/DL (ref 70–99)
POTASSIUM SERPL-SCNC: 4.2 MMOL/L (ref 3.5–5.1)
SODIUM BLD-SCNC: 149 MMOL/L (ref 135–145)
TOTAL PROTEIN: 5.9 GM/DL (ref 6.4–8.2)

## 2019-07-02 PROCEDURE — 80053 COMPREHEN METABOLIC PANEL: CPT

## 2019-07-16 ENCOUNTER — HOSPITAL ENCOUNTER (OUTPATIENT)
Age: 74
Setting detail: SPECIMEN
Discharge: HOME | End: 2019-07-16
Payer: MEDICARE

## 2019-07-16 LAB — CEA: 1.7 NG/ML

## 2019-07-16 PROCEDURE — 82378 CARCINOEMBRYONIC ANTIGEN: CPT

## 2019-08-06 ENCOUNTER — HOSPITAL ENCOUNTER (OUTPATIENT)
Age: 74
Setting detail: SPECIMEN
Discharge: HOME | End: 2019-08-06
Payer: MEDICARE

## 2019-08-06 LAB — CEA: 1.2 NG/ML

## 2019-08-06 PROCEDURE — 82378 CARCINOEMBRYONIC ANTIGEN: CPT

## 2019-08-23 ENCOUNTER — HOSPITAL ENCOUNTER (OUTPATIENT)
Age: 74
Setting detail: SPECIMEN
Discharge: HOME | End: 2019-08-23
Payer: MEDICARE

## 2019-08-23 LAB
ALBUMIN SERPL-MCNC: 3.5 GM/DL (ref 3.4–5)
ALP BLD-CCNC: 112 IU/L (ref 40–128)
ALT SERPL-CCNC: 41 U/L (ref 10–40)
ANION GAP SERPL CALCULATED.3IONS-SCNC: 10 MMOL/L (ref 4–16)
AST SERPL-CCNC: 43 IU/L (ref 15–37)
BILIRUB SERPL-MCNC: 0.3 MG/DL (ref 0–1)
BUN BLDV-MCNC: 21 MG/DL (ref 6–23)
CALCIUM SERPL-MCNC: 9.2 MG/DL (ref 8.3–10.6)
CHLORIDE BLD-SCNC: 110 MMOL/L (ref 99–110)
CO2: 25 MMOL/L (ref 21–32)
CREAT SERPL-MCNC: 0.8 MG/DL (ref 0.6–1.1)
GFR AFRICAN AMERICAN: >60 ML/MIN/1.73M2
GFR NON-AFRICAN AMERICAN: >60 ML/MIN/1.73M2
GLUCOSE BLD-MCNC: 133 MG/DL (ref 70–99)
POTASSIUM SERPL-SCNC: 4.1 MMOL/L (ref 3.5–5.1)
SODIUM BLD-SCNC: 145 MMOL/L (ref 135–145)
TOTAL PROTEIN: 5.6 GM/DL (ref 6.4–8.2)

## 2019-08-23 PROCEDURE — 80053 COMPREHEN METABOLIC PANEL: CPT

## 2019-09-06 ENCOUNTER — HOSPITAL ENCOUNTER (OUTPATIENT)
Age: 74
Setting detail: SPECIMEN
Discharge: HOME | End: 2019-09-06
Payer: MEDICARE

## 2019-09-06 LAB — CEA: 1.2 NG/ML

## 2019-09-06 PROCEDURE — 82378 CARCINOEMBRYONIC ANTIGEN: CPT

## 2019-09-12 ENCOUNTER — OFFICE VISIT (OUTPATIENT)
Dept: PULMONOLOGY | Age: 74
End: 2019-09-12
Payer: MEDICARE

## 2019-09-12 VITALS
BODY MASS INDEX: 26.33 KG/M2 | WEIGHT: 158 LBS | OXYGEN SATURATION: 97 % | HEIGHT: 65 IN | HEART RATE: 93 BPM | DIASTOLIC BLOOD PRESSURE: 64 MMHG | SYSTOLIC BLOOD PRESSURE: 112 MMHG

## 2019-09-12 DIAGNOSIS — J90 PLEURAL EFFUSION, RIGHT: ICD-10-CM

## 2019-09-12 DIAGNOSIS — J44.9 COPD, MILD (HCC): ICD-10-CM

## 2019-09-12 DIAGNOSIS — R91.8 MULTIPLE LUNG NODULES ON CT: Primary | ICD-10-CM

## 2019-09-12 DIAGNOSIS — C18.7 MALIGNANT NEOPLASM OF SIGMOID COLON (HCC): ICD-10-CM

## 2019-09-12 PROCEDURE — G8400 PT W/DXA NO RESULTS DOC: HCPCS | Performed by: INTERNAL MEDICINE

## 2019-09-12 PROCEDURE — 4040F PNEUMOC VAC/ADMIN/RCVD: CPT | Performed by: INTERNAL MEDICINE

## 2019-09-12 PROCEDURE — 3017F COLORECTAL CA SCREEN DOC REV: CPT | Performed by: INTERNAL MEDICINE

## 2019-09-12 PROCEDURE — 1123F ACP DISCUSS/DSCN MKR DOCD: CPT | Performed by: INTERNAL MEDICINE

## 2019-09-12 PROCEDURE — G8427 DOCREV CUR MEDS BY ELIG CLIN: HCPCS | Performed by: INTERNAL MEDICINE

## 2019-09-12 PROCEDURE — 1036F TOBACCO NON-USER: CPT | Performed by: INTERNAL MEDICINE

## 2019-09-12 PROCEDURE — 3023F SPIROM DOC REV: CPT | Performed by: INTERNAL MEDICINE

## 2019-09-12 PROCEDURE — G8598 ASA/ANTIPLAT THER USED: HCPCS | Performed by: INTERNAL MEDICINE

## 2019-09-12 PROCEDURE — G8926 SPIRO NO PERF OR DOC: HCPCS | Performed by: INTERNAL MEDICINE

## 2019-09-12 PROCEDURE — 99213 OFFICE O/P EST LOW 20 MIN: CPT | Performed by: INTERNAL MEDICINE

## 2019-09-12 PROCEDURE — 1090F PRES/ABSN URINE INCON ASSESS: CPT | Performed by: INTERNAL MEDICINE

## 2019-09-12 PROCEDURE — G8419 CALC BMI OUT NRM PARAM NOF/U: HCPCS | Performed by: INTERNAL MEDICINE

## 2019-09-12 NOTE — PROGRESS NOTES
SUBJECTIVE:  Chief Complaint: Mild COPD, multiple lung nodules on CT, right pleural effusion, cancer of the colon, metastatic colon cancer to lung  Manoj CRAIG states that other than fatigue she is doing well. She has had no recent episodes of bronchitis and denies chest congestion or cough. She denies hemoptysis. She is not short of breath except with certain types of exertion. She is not on bronchodilator therapy. She is not using oxygen at nighttime and does have a history of nocturnal hypoxemia. She still is receiving chemotherapy through oncology. On 4/24/2019 she underwent transthoracic needle biopsy right lung which showed metastatic  colon cancer to the lung. Since then she has had chemotherapy  OBJECTIVE:  /64   Pulse 93   Ht 5' 5\" (1.651 m)   Wt 158 lb (71.7 kg)   SpO2 97%   BMI 26.29 kg/m²      Physical Exam:  Constitutional:  She appears well developed and well-nourished. Neck:  Supple, No palpable lymphadenopathy, No JVD  Cardiovascular:  S1, S2 Normal, Regular rhythm, no murmurs or gallops, No pericardial  rubs. Pulmonary: Breath sounds are mildly diminished bilaterally but I hear no wheezing or rhonchi  Abdomen: Not examined  Extremities: no edema, No DVT  Neurologic:  Awake and Alert, No focal deficits    Radiology: Portable chest x-ray on 4/24/2019 showed right pulmonary nodule with no pneumothorax and stable cardiomegaly without overt failure  PFT: Office spirometry on 10/9/2018 demonstrated a mild obstructive defect with no significant response to bronchodilators        ASSESSMENT:    1. Multiple lung nodules on CT    2. COPD, mild ([address]    3. Pleural effusion, right    4. Malignant neoplasm of sigmoid colon ([facility])          PLAN:   I will check with Dr. Leanna Paris to see whether he is planning on repeating her CT chest.  I would like to repeat her pulmonary function test in the near future.   I will continue to follow her  Return in about 4 weeks (around 10/10/2019) for Recheck for COPD, PFT

## 2019-09-17 ENCOUNTER — HOSPITAL ENCOUNTER (OUTPATIENT)
Age: 74
Setting detail: SPECIMEN
Discharge: HOME | End: 2019-09-17
Payer: MEDICARE

## 2019-09-17 LAB — CEA: 1.7 NG/ML

## 2019-09-17 PROCEDURE — 82378 CARCINOEMBRYONIC ANTIGEN: CPT

## 2019-09-20 ENCOUNTER — HOSPITAL ENCOUNTER (OUTPATIENT)
Age: 74
Setting detail: SPECIMEN
Discharge: HOME | End: 2019-09-20
Payer: MEDICARE

## 2019-09-20 PROCEDURE — P9016 RBC LEUKOCYTES REDUCED: HCPCS

## 2019-09-20 PROCEDURE — 86922 COMPATIBILITY TEST ANTIGLOB: CPT

## 2019-09-20 PROCEDURE — 86900 BLOOD TYPING SEROLOGIC ABO: CPT

## 2019-09-20 PROCEDURE — 86901 BLOOD TYPING SEROLOGIC RH(D): CPT

## 2019-09-20 PROCEDURE — 86850 RBC ANTIBODY SCREEN: CPT

## 2019-09-21 ENCOUNTER — HOSPITAL ENCOUNTER (OUTPATIENT)
Dept: INFUSION THERAPY | Age: 74
Setting detail: INFUSION SERIES
Discharge: HOME | End: 2019-09-21
Payer: MEDICARE

## 2019-09-21 VITALS
TEMPERATURE: 98.3 F | RESPIRATION RATE: 14 BRPM | DIASTOLIC BLOOD PRESSURE: 85 MMHG | HEART RATE: 61 BPM | OXYGEN SATURATION: 99 % | SYSTOLIC BLOOD PRESSURE: 202 MMHG

## 2019-09-21 PROCEDURE — 6370000000 HC RX 637 (ALT 250 FOR IP): Performed by: INTERNAL MEDICINE

## 2019-09-21 PROCEDURE — 96375 TX/PRO/DX INJ NEW DRUG ADDON: CPT

## 2019-09-21 PROCEDURE — 2580000003 HC RX 258: Performed by: INTERNAL MEDICINE

## 2019-09-21 PROCEDURE — 96374 THER/PROPH/DIAG INJ IV PUSH: CPT

## 2019-09-21 PROCEDURE — 36430 TRANSFUSION BLD/BLD COMPNT: CPT

## 2019-09-21 PROCEDURE — P9016 RBC LEUKOCYTES REDUCED: HCPCS

## 2019-09-21 PROCEDURE — 2580000003 HC RX 258

## 2019-09-21 PROCEDURE — 99211 OFF/OP EST MAY X REQ PHY/QHP: CPT

## 2019-09-21 PROCEDURE — 6360000002 HC RX W HCPCS: Performed by: INTERNAL MEDICINE

## 2019-09-21 RX ORDER — HEPARIN SODIUM (PORCINE) LOCK FLUSH IV SOLN 100 UNIT/ML 100 UNIT/ML
500 SOLUTION INTRAVENOUS PRN
Status: DISCONTINUED | OUTPATIENT
Start: 2019-09-21 | End: 2019-09-22 | Stop reason: HOSPADM

## 2019-09-21 RX ORDER — ACETAMINOPHEN 325 MG/1
650 TABLET ORAL SEE ADMIN INSTRUCTIONS
Status: COMPLETED | OUTPATIENT
Start: 2019-09-21 | End: 2019-09-21

## 2019-09-21 RX ORDER — FUROSEMIDE 10 MG/ML
20 INJECTION INTRAMUSCULAR; INTRAVENOUS SEE ADMIN INSTRUCTIONS
Status: COMPLETED | OUTPATIENT
Start: 2019-09-21 | End: 2019-09-21

## 2019-09-21 RX ORDER — SODIUM CHLORIDE 0.9 % (FLUSH) 0.9 %
10 SYRINGE (ML) INJECTION PRN
Status: DISCONTINUED | OUTPATIENT
Start: 2019-09-21 | End: 2019-09-22 | Stop reason: HOSPADM

## 2019-09-21 RX ORDER — 0.9 % SODIUM CHLORIDE 0.9 %
250 INTRAVENOUS SOLUTION INTRAVENOUS ONCE
Status: COMPLETED | OUTPATIENT
Start: 2019-09-21 | End: 2019-09-21

## 2019-09-21 RX ORDER — DIPHENHYDRAMINE HCL 25 MG
25 TABLET ORAL SEE ADMIN INSTRUCTIONS
Status: DISCONTINUED | OUTPATIENT
Start: 2019-09-21 | End: 2019-09-22 | Stop reason: HOSPADM

## 2019-09-21 RX ORDER — METHYLPREDNISOLONE SODIUM SUCCINATE 40 MG/ML
20 INJECTION, POWDER, LYOPHILIZED, FOR SOLUTION INTRAMUSCULAR; INTRAVENOUS ONCE
Status: COMPLETED | OUTPATIENT
Start: 2019-09-21 | End: 2019-09-21

## 2019-09-21 RX ADMIN — Medication 20 ML: at 13:19

## 2019-09-21 RX ADMIN — Medication 500 UNITS: at 13:19

## 2019-09-21 RX ADMIN — SODIUM CHLORIDE 250 ML: 9 INJECTION, SOLUTION INTRAVENOUS at 08:12

## 2019-09-21 RX ADMIN — FUROSEMIDE 20 MG: 10 INJECTION, SOLUTION INTRAVENOUS at 13:19

## 2019-09-21 RX ADMIN — ACETAMINOPHEN 650 MG: 325 TABLET ORAL at 08:10

## 2019-09-21 RX ADMIN — METHYLPREDNISOLONE SODIUM SUCCINATE 20 MG: 40 INJECTION, POWDER, LYOPHILIZED, FOR SOLUTION INTRAMUSCULAR; INTRAVENOUS at 08:11

## 2019-09-21 RX ADMIN — WATER 10 ML: 1 INJECTION INTRAMUSCULAR; INTRAVENOUS; SUBCUTANEOUS at 08:10

## 2019-09-21 ASSESSMENT — PAIN SCALES - GENERAL
PAINLEVEL_OUTOF10: 0
PAINLEVEL_OUTOF10: 0

## 2019-09-21 NOTE — PROGRESS NOTES
Reviewed discharge instructions, voiced understanding, and copies of AVS given to take home. Pt tolerated blood transfusion well, with no s/s of an allergic reaction. Pt to private auto via steady gait.

## 2019-09-22 ENCOUNTER — HOSPITAL ENCOUNTER (INPATIENT)
Age: 74
LOS: 3 days | Discharge: HOME | DRG: 371 | End: 2019-09-25
Attending: EMERGENCY MEDICINE | Admitting: FAMILY MEDICINE
Payer: MEDICARE

## 2019-09-22 DIAGNOSIS — E87.6 HYPOKALEMIA: ICD-10-CM

## 2019-09-22 DIAGNOSIS — R19.7 ACUTE DIARRHEA: Primary | ICD-10-CM

## 2019-09-22 LAB
ABO/RH: NORMAL
ALBUMIN SERPL-MCNC: 3.5 GM/DL (ref 3.4–5)
ALP BLD-CCNC: 112 IU/L (ref 40–129)
ALT SERPL-CCNC: 29 U/L (ref 10–40)
ANION GAP SERPL CALCULATED.3IONS-SCNC: 18 MMOL/L (ref 4–16)
ANTIBODY SCREEN: NEGATIVE
AST SERPL-CCNC: 33 IU/L (ref 15–37)
BASOPHILS ABSOLUTE: 0 K/CU MM
BASOPHILS RELATIVE PERCENT: 0 % (ref 0–1)
BILIRUB SERPL-MCNC: 0.7 MG/DL (ref 0–1)
BUN BLDV-MCNC: 15 MG/DL (ref 6–23)
CALCIUM SERPL-MCNC: 9.3 MG/DL (ref 8.3–10.6)
CHLORIDE BLD-SCNC: 104 MMOL/L (ref 99–110)
CO2: 22 MMOL/L (ref 21–32)
COMPONENT: NORMAL
COMPONENT: NORMAL
CREAT SERPL-MCNC: 0.7 MG/DL (ref 0.6–1.1)
CROSSMATCH RESULT: NORMAL
CROSSMATCH RESULT: NORMAL
DIFFERENTIAL TYPE: ABNORMAL
EOSINOPHILS ABSOLUTE: 0 K/CU MM
EOSINOPHILS RELATIVE PERCENT: 0.4 % (ref 0–3)
GFR AFRICAN AMERICAN: >60 ML/MIN/1.73M2
GFR NON-AFRICAN AMERICAN: >60 ML/MIN/1.73M2
GLUCOSE BLD-MCNC: 92 MG/DL (ref 70–99)
GLUCOSE BLD-MCNC: 98 MG/DL (ref 70–99)
HCT VFR BLD CALC: 35.1 % (ref 37–47)
HEMOGLOBIN: 11.6 GM/DL (ref 12.5–16)
IMMATURE NEUTROPHIL %: 0 % (ref 0–0.43)
INR BLD: 1.02 INDEX
LYMPHOCYTES ABSOLUTE: 1.6 K/CU MM
LYMPHOCYTES RELATIVE PERCENT: 66.1 % (ref 24–44)
MAGNESIUM: 1.8 MG/DL (ref 1.8–2.4)
MCH RBC QN AUTO: 28.2 PG (ref 27–31)
MCHC RBC AUTO-ENTMCNC: 33 % (ref 32–36)
MCV RBC AUTO: 85.4 FL (ref 78–100)
MONOCYTES ABSOLUTE: 0.2 K/CU MM
MONOCYTES RELATIVE PERCENT: 6.3 % (ref 0–4)
PDW BLD-RTO: 14.6 % (ref 11.7–14.9)
PLATELET # BLD: 103 K/CU MM (ref 140–440)
PMV BLD AUTO: 11.8 FL (ref 7.5–11.1)
POTASSIUM SERPL-SCNC: ABNORMAL MMOL/L (ref 3.5–5.1)
PROTHROMBIN TIME: 11.6 SECONDS (ref 9.12–12.5)
RBC # BLD: 4.11 M/CU MM (ref 4.2–5.4)
SEGMENTED NEUTROPHILS ABSOLUTE COUNT: 0.7 K/CU MM
SEGMENTED NEUTROPHILS RELATIVE PERCENT: 27.2 % (ref 36–66)
SODIUM BLD-SCNC: 144 MMOL/L (ref 135–145)
STATUS: NORMAL
STATUS: NORMAL
TOTAL IMMATURE NEUTOROPHIL: 0 K/CU MM
TOTAL PROTEIN: 6.3 GM/DL (ref 6.4–8.2)
TRANSFUSION STATUS: NORMAL
TRANSFUSION STATUS: NORMAL
UNIT DIVISION: 0
UNIT DIVISION: 0
UNIT NUMBER: NORMAL
UNIT NUMBER: NORMAL
WBC # BLD: 2.4 K/CU MM (ref 4–10.5)

## 2019-09-22 PROCEDURE — 85025 COMPLETE CBC W/AUTO DIFF WBC: CPT

## 2019-09-22 PROCEDURE — 83036 HEMOGLOBIN GLYCOSYLATED A1C: CPT

## 2019-09-22 PROCEDURE — 96368 THER/DIAG CONCURRENT INF: CPT

## 2019-09-22 PROCEDURE — 84132 ASSAY OF SERUM POTASSIUM: CPT

## 2019-09-22 PROCEDURE — 96365 THER/PROPH/DIAG IV INF INIT: CPT

## 2019-09-22 PROCEDURE — 85610 PROTHROMBIN TIME: CPT

## 2019-09-22 PROCEDURE — 6370000000 HC RX 637 (ALT 250 FOR IP): Performed by: EMERGENCY MEDICINE

## 2019-09-22 PROCEDURE — 6370000000 HC RX 637 (ALT 250 FOR IP): Performed by: NURSE PRACTITIONER

## 2019-09-22 PROCEDURE — 96375 TX/PRO/DX INJ NEW DRUG ADDON: CPT

## 2019-09-22 PROCEDURE — 2580000003 HC RX 258: Performed by: EMERGENCY MEDICINE

## 2019-09-22 PROCEDURE — G0378 HOSPITAL OBSERVATION PER HR: HCPCS

## 2019-09-22 PROCEDURE — 36415 COLL VENOUS BLD VENIPUNCTURE: CPT

## 2019-09-22 PROCEDURE — 1200000000 HC SEMI PRIVATE

## 2019-09-22 PROCEDURE — 80053 COMPREHEN METABOLIC PANEL: CPT

## 2019-09-22 PROCEDURE — 83735 ASSAY OF MAGNESIUM: CPT

## 2019-09-22 PROCEDURE — 99284 EMERGENCY DEPT VISIT MOD MDM: CPT

## 2019-09-22 PROCEDURE — 6360000002 HC RX W HCPCS: Performed by: EMERGENCY MEDICINE

## 2019-09-22 RX ORDER — NICOTINE POLACRILEX 4 MG
15 LOZENGE BUCCAL PRN
Status: DISCONTINUED | OUTPATIENT
Start: 2019-09-22 | End: 2019-09-25 | Stop reason: HOSPADM

## 2019-09-22 RX ORDER — SIMVASTATIN 40 MG
40 TABLET ORAL NIGHTLY
Status: DISCONTINUED | OUTPATIENT
Start: 2019-09-22 | End: 2019-09-25 | Stop reason: HOSPADM

## 2019-09-22 RX ORDER — ONDANSETRON 2 MG/ML
4 INJECTION INTRAMUSCULAR; INTRAVENOUS EVERY 30 MIN PRN
Status: DISCONTINUED | OUTPATIENT
Start: 2019-09-22 | End: 2019-09-22 | Stop reason: SDUPTHER

## 2019-09-22 RX ORDER — FAMOTIDINE 20 MG/1
20 TABLET, FILM COATED ORAL 2 TIMES DAILY
Status: DISCONTINUED | OUTPATIENT
Start: 2019-09-22 | End: 2019-09-25 | Stop reason: HOSPADM

## 2019-09-22 RX ORDER — POTASSIUM CHLORIDE 7.45 MG/ML
10 INJECTION INTRAVENOUS
Status: COMPLETED | OUTPATIENT
Start: 2019-09-22 | End: 2019-09-22

## 2019-09-22 RX ORDER — DEXTROSE MONOHYDRATE 25 G/50ML
12.5 INJECTION, SOLUTION INTRAVENOUS PRN
Status: DISCONTINUED | OUTPATIENT
Start: 2019-09-22 | End: 2019-09-25 | Stop reason: HOSPADM

## 2019-09-22 RX ORDER — ONDANSETRON 2 MG/ML
4 INJECTION INTRAMUSCULAR; INTRAVENOUS EVERY 6 HOURS PRN
Status: DISCONTINUED | OUTPATIENT
Start: 2019-09-22 | End: 2019-09-25 | Stop reason: HOSPADM

## 2019-09-22 RX ORDER — SODIUM CHLORIDE 0.9 % (FLUSH) 0.9 %
10 SYRINGE (ML) INJECTION EVERY 12 HOURS SCHEDULED
Status: DISCONTINUED | OUTPATIENT
Start: 2019-09-22 | End: 2019-09-25 | Stop reason: HOSPADM

## 2019-09-22 RX ORDER — ASPIRIN 325 MG
325 TABLET ORAL DAILY
Status: DISCONTINUED | OUTPATIENT
Start: 2019-09-23 | End: 2019-09-25 | Stop reason: HOSPADM

## 2019-09-22 RX ORDER — SODIUM CHLORIDE 0.9 % (FLUSH) 0.9 %
10 SYRINGE (ML) INJECTION PRN
Status: DISCONTINUED | OUTPATIENT
Start: 2019-09-22 | End: 2019-09-25 | Stop reason: HOSPADM

## 2019-09-22 RX ORDER — 0.9 % SODIUM CHLORIDE 0.9 %
1000 INTRAVENOUS SOLUTION INTRAVENOUS ONCE
Status: COMPLETED | OUTPATIENT
Start: 2019-09-22 | End: 2019-09-22

## 2019-09-22 RX ORDER — MAGNESIUM SULFATE IN WATER 40 MG/ML
2 INJECTION, SOLUTION INTRAVENOUS ONCE
Status: COMPLETED | OUTPATIENT
Start: 2019-09-22 | End: 2019-09-22

## 2019-09-22 RX ORDER — DEXTROSE MONOHYDRATE 50 MG/ML
100 INJECTION, SOLUTION INTRAVENOUS PRN
Status: DISCONTINUED | OUTPATIENT
Start: 2019-09-22 | End: 2019-09-25 | Stop reason: HOSPADM

## 2019-09-22 RX ORDER — M-VIT,TX,IRON,MINS/CALC/FOLIC 27MG-0.4MG
1 TABLET ORAL DAILY
Status: DISCONTINUED | OUTPATIENT
Start: 2019-09-23 | End: 2019-09-25 | Stop reason: HOSPADM

## 2019-09-22 RX ORDER — ACETAMINOPHEN 325 MG/1
650 TABLET ORAL EVERY 4 HOURS PRN
Status: DISCONTINUED | OUTPATIENT
Start: 2019-09-22 | End: 2019-09-25 | Stop reason: HOSPADM

## 2019-09-22 RX ADMIN — POTASSIUM CHLORIDE 10 MEQ: 7.46 INJECTION, SOLUTION INTRAVENOUS at 22:51

## 2019-09-22 RX ADMIN — POTASSIUM CHLORIDE 10 MEQ: 7.46 INJECTION, SOLUTION INTRAVENOUS at 20:43

## 2019-09-22 RX ADMIN — POTASSIUM BICARBONATE 20 MEQ: 782 TABLET, EFFERVESCENT ORAL at 20:43

## 2019-09-22 RX ADMIN — FAMOTIDINE 20 MG: 20 TABLET ORAL at 22:50

## 2019-09-22 RX ADMIN — ONDANSETRON 4 MG: 2 INJECTION INTRAMUSCULAR; INTRAVENOUS at 19:54

## 2019-09-22 RX ADMIN — SIMVASTATIN 40 MG: 40 TABLET, FILM COATED ORAL at 22:50

## 2019-09-22 RX ADMIN — SODIUM CHLORIDE 1000 ML: 9 INJECTION, SOLUTION INTRAVENOUS at 19:53

## 2019-09-22 RX ADMIN — MAGNESIUM SULFATE HEPTAHYDRATE 2 G: 40 INJECTION, SOLUTION INTRAVENOUS at 20:46

## 2019-09-22 ASSESSMENT — PAIN SCALES - GENERAL
PAINLEVEL_OUTOF10: 0
PAINLEVEL_OUTOF10: 0

## 2019-09-23 LAB
ANION GAP SERPL CALCULATED.3IONS-SCNC: 14 MMOL/L (ref 4–16)
BASOPHILS ABSOLUTE: 0 K/CU MM
BASOPHILS RELATIVE PERCENT: 0 % (ref 0–1)
BUN BLDV-MCNC: 12 MG/DL (ref 6–23)
CALCIUM SERPL-MCNC: 8.4 MG/DL (ref 8.3–10.6)
CHLORIDE BLD-SCNC: 108 MMOL/L (ref 99–110)
CO2: 24 MMOL/L (ref 21–32)
CREAT SERPL-MCNC: 0.7 MG/DL (ref 0.6–1.1)
DIFFERENTIAL TYPE: ABNORMAL
EOSINOPHILS ABSOLUTE: 0 K/CU MM
EOSINOPHILS RELATIVE PERCENT: 0.5 % (ref 0–3)
ESTIMATED AVERAGE GLUCOSE: 126 MG/DL
GFR AFRICAN AMERICAN: >60 ML/MIN/1.73M2
GFR NON-AFRICAN AMERICAN: >60 ML/MIN/1.73M2
GLUCOSE BLD-MCNC: 107 MG/DL (ref 70–99)
GLUCOSE BLD-MCNC: 109 MG/DL (ref 70–99)
GLUCOSE BLD-MCNC: 124 MG/DL (ref 70–99)
GLUCOSE BLD-MCNC: 95 MG/DL (ref 70–99)
GLUCOSE BLD-MCNC: 98 MG/DL (ref 70–99)
HBA1C MFR BLD: 6 % (ref 4.2–6.3)
HCT VFR BLD CALC: 31.3 % (ref 37–47)
HEMOGLOBIN: 9.9 GM/DL (ref 12.5–16)
IMMATURE NEUTROPHIL %: 0.5 % (ref 0–0.43)
LYMPHOCYTES ABSOLUTE: 1.4 K/CU MM
LYMPHOCYTES RELATIVE PERCENT: 64.9 % (ref 24–44)
MAGNESIUM: 2 MG/DL (ref 1.8–2.4)
MCH RBC QN AUTO: 27.4 PG (ref 27–31)
MCHC RBC AUTO-ENTMCNC: 31.6 % (ref 32–36)
MCV RBC AUTO: 86.7 FL (ref 78–100)
MONOCYTES ABSOLUTE: 0.2 K/CU MM
MONOCYTES RELATIVE PERCENT: 8.7 % (ref 0–4)
PDW BLD-RTO: 14.3 % (ref 11.7–14.9)
PLATELET # BLD: 76 K/CU MM (ref 140–440)
PMV BLD AUTO: 10.7 FL (ref 7.5–11.1)
POTASSIUM SERPL-SCNC: 3.1 MMOL/L (ref 3.5–5.1)
POTASSIUM SERPL-SCNC: 3.3 MMOL/L (ref 3.5–5.1)
RBC # BLD: 3.61 M/CU MM (ref 4.2–5.4)
SEGMENTED NEUTROPHILS ABSOLUTE COUNT: 0.5 K/CU MM
SEGMENTED NEUTROPHILS RELATIVE PERCENT: 25.4 % (ref 36–66)
SODIUM BLD-SCNC: 146 MMOL/L (ref 135–145)
TOTAL IMMATURE NEUTOROPHIL: 0.01 K/CU MM
WBC # BLD: 2.1 K/CU MM (ref 4–10.5)

## 2019-09-23 PROCEDURE — 82962 GLUCOSE BLOOD TEST: CPT

## 2019-09-23 PROCEDURE — 6370000000 HC RX 637 (ALT 250 FOR IP): Performed by: FAMILY MEDICINE

## 2019-09-23 PROCEDURE — 80048 BASIC METABOLIC PNL TOTAL CA: CPT

## 2019-09-23 PROCEDURE — G0378 HOSPITAL OBSERVATION PER HR: HCPCS

## 2019-09-23 PROCEDURE — 83735 ASSAY OF MAGNESIUM: CPT

## 2019-09-23 PROCEDURE — 2580000003 HC RX 258: Performed by: NURSE PRACTITIONER

## 2019-09-23 PROCEDURE — 87507 IADNA-DNA/RNA PROBE TQ 12-25: CPT

## 2019-09-23 PROCEDURE — 36415 COLL VENOUS BLD VENIPUNCTURE: CPT

## 2019-09-23 PROCEDURE — 6370000000 HC RX 637 (ALT 250 FOR IP): Performed by: NURSE PRACTITIONER

## 2019-09-23 PROCEDURE — 36591 DRAW BLOOD OFF VENOUS DEVICE: CPT

## 2019-09-23 PROCEDURE — 1200000000 HC SEMI PRIVATE

## 2019-09-23 PROCEDURE — 85025 COMPLETE CBC W/AUTO DIFF WBC: CPT

## 2019-09-23 RX ORDER — POTASSIUM CHLORIDE 20 MEQ/1
20 TABLET, EXTENDED RELEASE ORAL 2 TIMES DAILY WITH MEALS
Status: DISCONTINUED | OUTPATIENT
Start: 2019-09-23 | End: 2019-09-25 | Stop reason: HOSPADM

## 2019-09-23 RX ORDER — POTASSIUM CHLORIDE 20 MEQ/1
20 TABLET, EXTENDED RELEASE ORAL ONCE
Status: COMPLETED | OUTPATIENT
Start: 2019-09-23 | End: 2019-09-23

## 2019-09-23 RX ORDER — SODIUM CHLORIDE 9 MG/ML
INJECTION, SOLUTION INTRAVENOUS CONTINUOUS
Status: DISCONTINUED | OUTPATIENT
Start: 2019-09-23 | End: 2019-09-23

## 2019-09-23 RX ADMIN — ASPIRIN 325 MG: 325 TABLET ORAL at 09:16

## 2019-09-23 RX ADMIN — OYSTER SHELL CALCIUM WITH VITAMIN D 1 TABLET: 500; 200 TABLET, FILM COATED ORAL at 09:16

## 2019-09-23 RX ADMIN — SODIUM CHLORIDE, PRESERVATIVE FREE 10 ML: 5 INJECTION INTRAVENOUS at 20:39

## 2019-09-23 RX ADMIN — FAMOTIDINE 20 MG: 20 TABLET ORAL at 20:39

## 2019-09-23 RX ADMIN — MULTIPLE VITAMINS W/ MINERALS TAB 1 TABLET: TAB at 09:16

## 2019-09-23 RX ADMIN — SIMVASTATIN 40 MG: 40 TABLET, FILM COATED ORAL at 20:39

## 2019-09-23 RX ADMIN — SODIUM CHLORIDE: 9 INJECTION, SOLUTION INTRAVENOUS at 09:16

## 2019-09-23 RX ADMIN — POTASSIUM CHLORIDE 20 MEQ: 20 TABLET, EXTENDED RELEASE ORAL at 16:54

## 2019-09-23 RX ADMIN — SODIUM CHLORIDE, PRESERVATIVE FREE 10 ML: 5 INJECTION INTRAVENOUS at 09:20

## 2019-09-23 RX ADMIN — FAMOTIDINE 20 MG: 20 TABLET ORAL at 09:16

## 2019-09-23 RX ADMIN — POTASSIUM CHLORIDE 20 MEQ: 20 TABLET, EXTENDED RELEASE ORAL at 02:26

## 2019-09-23 ASSESSMENT — PAIN SCALES - GENERAL
PAINLEVEL_OUTOF10: 0

## 2019-09-24 PROBLEM — R19.7 ACUTE DIARRHEA: Status: RESOLVED | Noted: 2019-09-24 | Resolved: 2019-09-24

## 2019-09-24 PROBLEM — R19.7 ACUTE DIARRHEA: Status: ACTIVE | Noted: 2019-09-24

## 2019-09-24 PROBLEM — E87.6 HYPOKALEMIA, GASTROINTESTINAL LOSSES: Status: RESOLVED | Noted: 2019-09-22 | Resolved: 2019-09-24

## 2019-09-24 LAB
ADENOVIRUS F 40 41 PCR: NOT DETECTED
ANION GAP SERPL CALCULATED.3IONS-SCNC: 10 MMOL/L (ref 4–16)
ASTROVIRUS PCR: NOT DETECTED
BUN BLDV-MCNC: 10 MG/DL (ref 6–23)
CALCIUM SERPL-MCNC: 8.9 MG/DL (ref 8.3–10.6)
CAMPYLOBACTER PCR: NOT DETECTED
CHLORIDE BLD-SCNC: 106 MMOL/L (ref 99–110)
CLOSTRIDIUM DIFFICILE, PCR: ABNORMAL
CLOSTRIDIUM DIFFICILE, PCR: ABNORMAL
CO2: 29 MMOL/L (ref 21–32)
CREAT SERPL-MCNC: 0.7 MG/DL (ref 0.6–1.1)
CRYPTOSPORIDIUM PCR: NOT DETECTED
CYCLOSPORA CAYETANENSIS PCR: NOT DETECTED
E COLI 0157 PCR: NOT DETECTED
E COLI ENTEROAGGREGATIVE PCR: NOT DETECTED
E COLI ENTEROPATHOGENIC PCR: NOT DETECTED
E COLI ENTEROTOXIGENIC PCR: NOT DETECTED
E COLI SHIGA LIKE TOXIN PCR: NOT DETECTED
E COLI SHIGELLA/ENTEROINVASIVE PCR: NOT DETECTED
ENTAMOEBA HISTOLYTICA PCR: NOT DETECTED
GFR AFRICAN AMERICAN: >60 ML/MIN/1.73M2
GFR NON-AFRICAN AMERICAN: >60 ML/MIN/1.73M2
GIARDIA LAMBLIA PCR: NOT DETECTED
GLUCOSE BLD-MCNC: 109 MG/DL (ref 70–99)
GLUCOSE BLD-MCNC: 112 MG/DL (ref 70–99)
GLUCOSE BLD-MCNC: 113 MG/DL (ref 70–99)
GLUCOSE BLD-MCNC: 119 MG/DL (ref 70–99)
GLUCOSE BLD-MCNC: 99 MG/DL (ref 70–99)
HCT VFR BLD CALC: 34 % (ref 37–47)
HEMOGLOBIN: 10.6 GM/DL (ref 12.5–16)
MCH RBC QN AUTO: 27.5 PG (ref 27–31)
MCHC RBC AUTO-ENTMCNC: 31.2 % (ref 32–36)
MCV RBC AUTO: 88.1 FL (ref 78–100)
NOROVIRUS GI GII PCR: NOT DETECTED
PDW BLD-RTO: 14.2 % (ref 11.7–14.9)
PLATELET # BLD: 82 K/CU MM (ref 140–440)
PLESIOMONAS SHIGELLOIDES PCR: NOT DETECTED
PMV BLD AUTO: 12.3 FL (ref 7.5–11.1)
POTASSIUM SERPL-SCNC: 3.4 MMOL/L (ref 3.5–5.1)
RBC # BLD: 3.86 M/CU MM (ref 4.2–5.4)
ROTAVIRUS A PCR: NOT DETECTED
SALMONELLA PCR: NOT DETECTED
SAPOVIRUS PCR: NOT DETECTED
SODIUM BLD-SCNC: 145 MMOL/L (ref 135–145)
VIBRIO CHOLERAE PCR: NOT DETECTED
VIBRIO PCR: NOT DETECTED
WBC # BLD: 2.5 K/CU MM (ref 4–10.5)
YERSINIA ENTEROCOLITICA PCR: NOT DETECTED

## 2019-09-24 PROCEDURE — 1200000000 HC SEMI PRIVATE

## 2019-09-24 PROCEDURE — G0378 HOSPITAL OBSERVATION PER HR: HCPCS

## 2019-09-24 PROCEDURE — 82962 GLUCOSE BLOOD TEST: CPT

## 2019-09-24 PROCEDURE — 36415 COLL VENOUS BLD VENIPUNCTURE: CPT

## 2019-09-24 PROCEDURE — 2500000003 HC RX 250 WO HCPCS: Performed by: FAMILY MEDICINE

## 2019-09-24 PROCEDURE — 6370000000 HC RX 637 (ALT 250 FOR IP): Performed by: FAMILY MEDICINE

## 2019-09-24 PROCEDURE — 6370000000 HC RX 637 (ALT 250 FOR IP): Performed by: NURSE PRACTITIONER

## 2019-09-24 PROCEDURE — 85027 COMPLETE CBC AUTOMATED: CPT

## 2019-09-24 PROCEDURE — 80048 BASIC METABOLIC PNL TOTAL CA: CPT

## 2019-09-24 PROCEDURE — 87324 CLOSTRIDIUM AG IA: CPT

## 2019-09-24 PROCEDURE — 2580000003 HC RX 258: Performed by: NURSE PRACTITIONER

## 2019-09-24 RX ORDER — POTASSIUM CHLORIDE 20 MEQ/1
20 TABLET, EXTENDED RELEASE ORAL 2 TIMES DAILY
Qty: 6 TABLET | Refills: 0 | Status: SHIPPED | OUTPATIENT
Start: 2019-09-24 | End: 2020-01-06 | Stop reason: DRUGHIGH

## 2019-09-24 RX ADMIN — FAMOTIDINE 20 MG: 20 TABLET ORAL at 20:54

## 2019-09-24 RX ADMIN — METRONIDAZOLE 500 MG: 500 INJECTION, SOLUTION INTRAVENOUS at 23:44

## 2019-09-24 RX ADMIN — SIMVASTATIN 40 MG: 40 TABLET, FILM COATED ORAL at 20:54

## 2019-09-24 RX ADMIN — METRONIDAZOLE 500 MG: 500 INJECTION, SOLUTION INTRAVENOUS at 15:56

## 2019-09-24 RX ADMIN — ASPIRIN 325 MG: 325 TABLET ORAL at 09:22

## 2019-09-24 RX ADMIN — POTASSIUM CHLORIDE 20 MEQ: 20 TABLET, EXTENDED RELEASE ORAL at 09:22

## 2019-09-24 RX ADMIN — POTASSIUM CHLORIDE 20 MEQ: 20 TABLET, EXTENDED RELEASE ORAL at 17:50

## 2019-09-24 RX ADMIN — MULTIPLE VITAMINS W/ MINERALS TAB 1 TABLET: TAB at 09:22

## 2019-09-24 RX ADMIN — OYSTER SHELL CALCIUM WITH VITAMIN D 1 TABLET: 500; 200 TABLET, FILM COATED ORAL at 09:22

## 2019-09-24 RX ADMIN — FAMOTIDINE 20 MG: 20 TABLET ORAL at 09:22

## 2019-09-24 RX ADMIN — SODIUM CHLORIDE, PRESERVATIVE FREE 10 ML: 5 INJECTION INTRAVENOUS at 15:56

## 2019-09-24 RX ADMIN — SODIUM CHLORIDE, PRESERVATIVE FREE 10 ML: 5 INJECTION INTRAVENOUS at 20:54

## 2019-09-24 ASSESSMENT — PAIN SCALES - GENERAL
PAINLEVEL_OUTOF10: 0

## 2019-09-25 VITALS
DIASTOLIC BLOOD PRESSURE: 81 MMHG | WEIGHT: 158 LBS | OXYGEN SATURATION: 97 % | RESPIRATION RATE: 16 BRPM | HEIGHT: 65 IN | HEART RATE: 69 BPM | TEMPERATURE: 98.3 F | BODY MASS INDEX: 26.33 KG/M2 | SYSTOLIC BLOOD PRESSURE: 162 MMHG

## 2019-09-25 PROBLEM — E87.6 HYPOKALEMIA, GASTROINTESTINAL LOSSES: Status: ACTIVE | Noted: 2019-09-25

## 2019-09-25 LAB — GLUCOSE BLD-MCNC: 99 MG/DL (ref 70–99)

## 2019-09-25 PROCEDURE — G0378 HOSPITAL OBSERVATION PER HR: HCPCS

## 2019-09-25 PROCEDURE — 99211 OFF/OP EST MAY X REQ PHY/QHP: CPT

## 2019-09-25 PROCEDURE — 82962 GLUCOSE BLOOD TEST: CPT

## 2019-09-25 PROCEDURE — 6370000000 HC RX 637 (ALT 250 FOR IP): Performed by: NURSE PRACTITIONER

## 2019-09-25 PROCEDURE — 6370000000 HC RX 637 (ALT 250 FOR IP): Performed by: FAMILY MEDICINE

## 2019-09-25 PROCEDURE — 2500000003 HC RX 250 WO HCPCS: Performed by: FAMILY MEDICINE

## 2019-09-25 PROCEDURE — 1200000000 HC SEMI PRIVATE

## 2019-09-25 PROCEDURE — 6360000002 HC RX W HCPCS: Performed by: FAMILY MEDICINE

## 2019-09-25 RX ORDER — METRONIDAZOLE 500 MG/1
500 TABLET ORAL 3 TIMES DAILY
Qty: 21 TABLET | Refills: 0 | Status: SHIPPED | OUTPATIENT
Start: 2019-09-25 | End: 2019-10-02

## 2019-09-25 RX ORDER — HEPARIN SODIUM (PORCINE) LOCK FLUSH IV SOLN 100 UNIT/ML 100 UNIT/ML
500 SOLUTION INTRAVENOUS PRN
Status: DISCONTINUED | OUTPATIENT
Start: 2019-09-25 | End: 2019-09-25 | Stop reason: HOSPADM

## 2019-09-25 RX ADMIN — METRONIDAZOLE 500 MG: 500 INJECTION, SOLUTION INTRAVENOUS at 06:39

## 2019-09-25 RX ADMIN — FAMOTIDINE 20 MG: 20 TABLET ORAL at 08:09

## 2019-09-25 RX ADMIN — OYSTER SHELL CALCIUM WITH VITAMIN D 1 TABLET: 500; 200 TABLET, FILM COATED ORAL at 08:09

## 2019-09-25 RX ADMIN — MULTIPLE VITAMINS W/ MINERALS TAB 1 TABLET: TAB at 08:09

## 2019-09-25 RX ADMIN — ASPIRIN 325 MG: 325 TABLET ORAL at 08:09

## 2019-09-25 RX ADMIN — POTASSIUM CHLORIDE 20 MEQ: 20 TABLET, EXTENDED RELEASE ORAL at 08:09

## 2019-09-25 RX ADMIN — Medication 500 UNITS: at 11:55

## 2019-09-25 ASSESSMENT — PAIN SCALES - GENERAL: PAINLEVEL_OUTOF10: 0

## 2019-10-17 ENCOUNTER — NURSE ONLY (OUTPATIENT)
Dept: PULMONOLOGY | Age: 74
End: 2019-10-17

## 2019-10-17 DIAGNOSIS — R91.8 MULTIPLE LUNG NODULES ON CT: ICD-10-CM

## 2019-10-17 DIAGNOSIS — J44.9 COPD, MILD (HCC): Primary | ICD-10-CM

## 2019-10-17 DIAGNOSIS — C18.7 MALIGNANT NEOPLASM OF SIGMOID COLON (HCC): ICD-10-CM

## 2019-10-17 DIAGNOSIS — J44.9 COPD, MILD (HCC): ICD-10-CM

## 2019-10-17 DIAGNOSIS — J90 PLEURAL EFFUSION, RIGHT: ICD-10-CM

## 2019-10-17 LAB
EXPIRATORY TIME-POST: NORMAL SEC
EXPIRATORY TIME: NORMAL SEC
FEF 25-75% %CHNG: NORMAL
FEF 25-75% %PRED-POST: NORMAL %
FEF 25-75% %PRED-PRE: NORMAL L/SEC
FEF 25-75% PRED: NORMAL L/SEC
FEF 25-75%-POST: NORMAL L/SEC
FEF 25-75%-PRE: NORMAL L/SEC
FEV1 %PRED-POST: 33 %
FEV1 %PRED-PRE: 58 %
FEV1 PRED: 2.18 L
FEV1-POST: 0.72 L
FEV1-PRE: 1.27 L
FEV1/FVC %PRED-POST: 55 %
FEV1/FVC %PRED-PRE: 80 %
FEV1/FVC PRED: 75.5 %
FEV1/FVC-POST: 41.4 %
FEV1/FVC-PRE: 60.2 %
FVC %PRED-POST: 60 L
FVC %PRED-PRE: 73 %
FVC PRED: 2.9 L
FVC-POST: 1.75 L
FVC-PRE: 2.12 L
PEF %PRED-POST: NORMAL %
PEF %PRED-PRE: NORMAL L/SEC
PEF PRED: NORMAL L/SEC
PEF%CHNG: NORMAL
PEF-POST: NORMAL L/SEC
PEF-PRE: NORMAL L/SEC

## 2019-10-17 PROCEDURE — 99999 PR OFFICE/OUTPT VISIT,PROCEDURE ONLY: CPT | Performed by: INTERNAL MEDICINE

## 2019-10-17 PROCEDURE — 94060 EVALUATION OF WHEEZING: CPT | Performed by: INTERNAL MEDICINE

## 2019-10-17 ASSESSMENT — PULMONARY FUNCTION TESTS
FVC_PREDICTED: 2.90
FVC_POST: 1.75
FEV1_PRE: 1.27
FEV1_POST: 0.72
FEV1_PERCENT_PREDICTED_PRE: 58
FEV1/FVC_PRE: 60.2
FEV1_PERCENT_PREDICTED_POST: 33
FEV1/FVC_PREDICTED: 75.5
FEV1/FVC_PERCENT_PREDICTED_POST: 55
FVC_PERCENT_PREDICTED_POST: 60
FEV1/FVC_PERCENT_PREDICTED_PRE: 80
FEV1/FVC_POST: 41.4
FVC_PRE: 2.12
FVC_PERCENT_PREDICTED_PRE: 73
FEV1_PREDICTED: 2.18

## 2019-11-08 ENCOUNTER — HOSPITAL ENCOUNTER (OUTPATIENT)
Age: 74
Setting detail: SPECIMEN
Discharge: HOME | End: 2019-11-08
Payer: MEDICARE

## 2019-11-08 LAB
ALBUMIN SERPL-MCNC: 4 GM/DL (ref 3.4–5)
ALP BLD-CCNC: 290 IU/L (ref 40–128)
ALT SERPL-CCNC: 28 U/L (ref 10–40)
ANION GAP SERPL CALCULATED.3IONS-SCNC: 15 MMOL/L (ref 4–16)
AST SERPL-CCNC: 43 IU/L (ref 15–37)
BILIRUB SERPL-MCNC: 0.6 MG/DL (ref 0–1)
BUN BLDV-MCNC: 10 MG/DL (ref 6–23)
CALCIUM SERPL-MCNC: 9.8 MG/DL (ref 8.3–10.6)
CEA: 6.2 NG/ML
CHLORIDE BLD-SCNC: 103 MMOL/L (ref 99–110)
CO2: 27 MMOL/L (ref 21–32)
CREAT SERPL-MCNC: 0.8 MG/DL (ref 0.6–1.1)
GFR AFRICAN AMERICAN: >60 ML/MIN/1.73M2
GFR NON-AFRICAN AMERICAN: >60 ML/MIN/1.73M2
GLUCOSE BLD-MCNC: 111 MG/DL (ref 70–99)
POTASSIUM SERPL-SCNC: 4.1 MMOL/L (ref 3.5–5.1)
SODIUM BLD-SCNC: 145 MMOL/L (ref 135–145)
TOTAL PROTEIN: 7.1 GM/DL (ref 6.4–8.2)

## 2019-11-08 PROCEDURE — 80053 COMPREHEN METABOLIC PANEL: CPT

## 2019-11-08 PROCEDURE — 82378 CARCINOEMBRYONIC ANTIGEN: CPT

## 2019-11-10 ENCOUNTER — HOSPITAL ENCOUNTER (EMERGENCY)
Age: 74
Discharge: ANOTHER HOSPITAL | End: 2019-11-10
Attending: EMERGENCY MEDICINE
Payer: MEDICARE

## 2019-11-10 ENCOUNTER — HOSPITAL ENCOUNTER (OUTPATIENT)
Age: 74
Setting detail: OBSERVATION
LOS: 1 days | Discharge: HOME | End: 2019-11-13
Attending: INTERNAL MEDICINE | Admitting: INTERNAL MEDICINE
Payer: MEDICARE

## 2019-11-10 ENCOUNTER — APPOINTMENT (OUTPATIENT)
Dept: GENERAL RADIOLOGY | Age: 74
End: 2019-11-10
Payer: MEDICARE

## 2019-11-10 VITALS
TEMPERATURE: 98.2 F | BODY MASS INDEX: 25.95 KG/M2 | HEIGHT: 64 IN | WEIGHT: 152 LBS | OXYGEN SATURATION: 98 % | RESPIRATION RATE: 28 BRPM | HEART RATE: 76 BPM | SYSTOLIC BLOOD PRESSURE: 114 MMHG | DIASTOLIC BLOOD PRESSURE: 91 MMHG

## 2019-11-10 DIAGNOSIS — K92.2 LOWER GI BLEED: Primary | ICD-10-CM

## 2019-11-10 DIAGNOSIS — E87.6 HYPOKALEMIA: ICD-10-CM

## 2019-11-10 LAB
ALBUMIN SERPL-MCNC: 3.4 GM/DL (ref 3.4–5)
ALP BLD-CCNC: 227 IU/L (ref 40–129)
ALT SERPL-CCNC: 27 U/L (ref 10–40)
ANION GAP SERPL CALCULATED.3IONS-SCNC: 11 MMOL/L (ref 4–16)
APTT: 21.5 SECONDS (ref 25.1–37.1)
AST SERPL-CCNC: 39 IU/L (ref 15–37)
BASOPHILS ABSOLUTE: 0 K/CU MM
BASOPHILS RELATIVE PERCENT: 0.6 % (ref 0–1)
BILIRUB SERPL-MCNC: 0.4 MG/DL (ref 0–1)
BUN BLDV-MCNC: 9 MG/DL (ref 6–23)
CALCIUM SERPL-MCNC: 9 MG/DL (ref 8.3–10.6)
CHLORIDE BLD-SCNC: 106 MMOL/L (ref 99–110)
CO2: 27 MMOL/L (ref 21–32)
CREAT SERPL-MCNC: 0.8 MG/DL (ref 0.6–1.1)
DIFFERENTIAL TYPE: ABNORMAL
EOSINOPHILS ABSOLUTE: 0.2 K/CU MM
EOSINOPHILS RELATIVE PERCENT: 3.9 % (ref 0–3)
GFR AFRICAN AMERICAN: >60 ML/MIN/1.73M2
GFR NON-AFRICAN AMERICAN: >60 ML/MIN/1.73M2
GLUCOSE BLD-MCNC: 139 MG/DL (ref 70–99)
HCT VFR BLD CALC: 28.8 % (ref 37–47)
HCT VFR BLD CALC: 32.2 % (ref 37–47)
HEMOGLOBIN: 8.6 GM/DL (ref 12.5–16)
HEMOGLOBIN: 9.8 GM/DL (ref 12.5–16)
IMMATURE NEUTROPHIL %: 0.2 % (ref 0–0.43)
INR BLD: 1.04 INDEX
LACTATE: NORMAL MMOL/L (ref 0.4–2)
LYMPHOCYTES ABSOLUTE: 1.9 K/CU MM
LYMPHOCYTES RELATIVE PERCENT: 29.7 % (ref 24–44)
MCH RBC QN AUTO: 27.8 PG (ref 27–31)
MCHC RBC AUTO-ENTMCNC: 30.4 % (ref 32–36)
MCV RBC AUTO: 91.2 FL (ref 78–100)
MONOCYTES ABSOLUTE: 0.8 K/CU MM
MONOCYTES RELATIVE PERCENT: 13.2 % (ref 0–4)
PDW BLD-RTO: 15.8 % (ref 11.7–14.9)
PLATELET # BLD: 187 K/CU MM (ref 140–440)
PMV BLD AUTO: 11.4 FL (ref 7.5–11.1)
POTASSIUM SERPL-SCNC: ABNORMAL MMOL/L (ref 3.5–5.1)
PROTHROMBIN TIME: 11.9 SECONDS (ref 11.7–14.5)
RBC # BLD: 3.53 M/CU MM (ref 4.2–5.4)
SEGMENTED NEUTROPHILS ABSOLUTE COUNT: 3.3 K/CU MM
SEGMENTED NEUTROPHILS RELATIVE PERCENT: 52.4 % (ref 36–66)
SODIUM BLD-SCNC: 144 MMOL/L (ref 135–145)
TOTAL IMMATURE NEUTOROPHIL: 0.01 K/CU MM
TOTAL PROTEIN: 6.8 GM/DL (ref 6.4–8.2)
WBC # BLD: 6.2 K/CU MM (ref 4–10.5)

## 2019-11-10 PROCEDURE — 6360000002 HC RX W HCPCS: Performed by: EMERGENCY MEDICINE

## 2019-11-10 PROCEDURE — G0378 HOSPITAL OBSERVATION PER HR: HCPCS

## 2019-11-10 PROCEDURE — 96374 THER/PROPH/DIAG INJ IV PUSH: CPT

## 2019-11-10 PROCEDURE — 86922 COMPATIBILITY TEST ANTIGLOB: CPT

## 2019-11-10 PROCEDURE — 99285 EMERGENCY DEPT VISIT HI MDM: CPT

## 2019-11-10 PROCEDURE — 86900 BLOOD TYPING SEROLOGIC ABO: CPT

## 2019-11-10 PROCEDURE — 83605 ASSAY OF LACTIC ACID: CPT

## 2019-11-10 PROCEDURE — 85014 HEMATOCRIT: CPT

## 2019-11-10 PROCEDURE — 85018 HEMOGLOBIN: CPT

## 2019-11-10 PROCEDURE — 2580000003 HC RX 258: Performed by: EMERGENCY MEDICINE

## 2019-11-10 PROCEDURE — 74018 RADEX ABDOMEN 1 VIEW: CPT

## 2019-11-10 PROCEDURE — 86850 RBC ANTIBODY SCREEN: CPT

## 2019-11-10 PROCEDURE — 36415 COLL VENOUS BLD VENIPUNCTURE: CPT

## 2019-11-10 PROCEDURE — 86905 BLOOD TYPING RBC ANTIGENS: CPT

## 2019-11-10 PROCEDURE — 86901 BLOOD TYPING SEROLOGIC RH(D): CPT

## 2019-11-10 PROCEDURE — 85025 COMPLETE CBC W/AUTO DIFF WBC: CPT

## 2019-11-10 PROCEDURE — 85730 THROMBOPLASTIN TIME PARTIAL: CPT

## 2019-11-10 PROCEDURE — 96375 TX/PRO/DX INJ NEW DRUG ADDON: CPT

## 2019-11-10 PROCEDURE — 6370000000 HC RX 637 (ALT 250 FOR IP): Performed by: EMERGENCY MEDICINE

## 2019-11-10 PROCEDURE — 86870 RBC ANTIBODY IDENTIFICATION: CPT

## 2019-11-10 PROCEDURE — 80053 COMPREHEN METABOLIC PANEL: CPT

## 2019-11-10 PROCEDURE — 85610 PROTHROMBIN TIME: CPT

## 2019-11-10 PROCEDURE — C9113 INJ PANTOPRAZOLE SODIUM, VIA: HCPCS | Performed by: EMERGENCY MEDICINE

## 2019-11-10 RX ORDER — 0.9 % SODIUM CHLORIDE 0.9 %
1000 INTRAVENOUS SOLUTION INTRAVENOUS ONCE
Status: COMPLETED | OUTPATIENT
Start: 2019-11-10 | End: 2019-11-10

## 2019-11-10 RX ORDER — ONDANSETRON 2 MG/ML
4 INJECTION INTRAMUSCULAR; INTRAVENOUS EVERY 30 MIN PRN
Status: DISCONTINUED | OUTPATIENT
Start: 2019-11-10 | End: 2019-11-10 | Stop reason: HOSPADM

## 2019-11-10 RX ORDER — SODIUM CHLORIDE 9 MG/ML
INJECTION, SOLUTION INTRAVENOUS CONTINUOUS
Status: DISCONTINUED | OUTPATIENT
Start: 2019-11-10 | End: 2019-11-10 | Stop reason: HOSPADM

## 2019-11-10 RX ORDER — SODIUM CHLORIDE 9 MG/ML
INJECTION, SOLUTION INTRAVENOUS CONTINUOUS
Status: DISCONTINUED | OUTPATIENT
Start: 2019-11-10 | End: 2019-11-10

## 2019-11-10 RX ORDER — PANTOPRAZOLE SODIUM 40 MG/10ML
40 INJECTION, POWDER, LYOPHILIZED, FOR SOLUTION INTRAVENOUS ONCE
Status: COMPLETED | OUTPATIENT
Start: 2019-11-10 | End: 2019-11-10

## 2019-11-10 RX ORDER — POTASSIUM CHLORIDE AND SODIUM CHLORIDE 900; 300 MG/100ML; MG/100ML
INJECTION, SOLUTION INTRAVENOUS CONTINUOUS
Status: DISCONTINUED | OUTPATIENT
Start: 2019-11-11 | End: 2019-11-11

## 2019-11-10 RX ORDER — POTASSIUM CHLORIDE 20 MEQ/1
40 TABLET, EXTENDED RELEASE ORAL ONCE
Status: COMPLETED | OUTPATIENT
Start: 2019-11-10 | End: 2019-11-10

## 2019-11-10 RX ORDER — POTASSIUM CHLORIDE 750 MG/1
40 TABLET, FILM COATED, EXTENDED RELEASE ORAL DAILY
Status: DISCONTINUED | OUTPATIENT
Start: 2019-11-10 | End: 2019-11-11

## 2019-11-10 RX ADMIN — SODIUM CHLORIDE 1000 ML: 9 INJECTION, SOLUTION INTRAVENOUS at 18:39

## 2019-11-10 RX ADMIN — ONDANSETRON 4 MG: 2 INJECTION INTRAMUSCULAR; INTRAVENOUS at 18:41

## 2019-11-10 RX ADMIN — SODIUM CHLORIDE: 9 INJECTION, SOLUTION INTRAVENOUS at 18:40

## 2019-11-10 RX ADMIN — POTASSIUM CHLORIDE 40 MEQ: 20 TABLET, EXTENDED RELEASE ORAL at 20:03

## 2019-11-10 RX ADMIN — PANTOPRAZOLE SODIUM 40 MG: 40 INJECTION, POWDER, FOR SOLUTION INTRAVENOUS at 18:41

## 2019-11-11 LAB
ANION GAP SERPL CALCULATED.3IONS-SCNC: 8 MMOL/L (ref 4–16)
BASOPHILS ABSOLUTE: 0 K/CU MM
BASOPHILS RELATIVE PERCENT: 0.4 % (ref 0–1)
BUN BLDV-MCNC: 7 MG/DL (ref 6–23)
CALCIUM SERPL-MCNC: 8.9 MG/DL (ref 8.3–10.6)
CHLORIDE BLD-SCNC: 112 MMOL/L (ref 99–110)
CO2: 24 MMOL/L (ref 21–32)
CREAT SERPL-MCNC: 0.8 MG/DL (ref 0.6–1.1)
DIFFERENTIAL TYPE: ABNORMAL
EOSINOPHILS ABSOLUTE: 0.1 K/CU MM
EOSINOPHILS RELATIVE PERCENT: 2.6 % (ref 0–3)
GFR AFRICAN AMERICAN: >60 ML/MIN/1.73M2
GFR NON-AFRICAN AMERICAN: >60 ML/MIN/1.73M2
GLUCOSE BLD-MCNC: 93 MG/DL (ref 70–99)
HCT VFR BLD CALC: 28.3 % (ref 37–47)
HCT VFR BLD CALC: 29.2 % (ref 37–47)
HCT VFR BLD CALC: 31.9 % (ref 37–47)
HEMOGLOBIN: 8.4 GM/DL (ref 12.5–16)
HEMOGLOBIN: 8.8 GM/DL (ref 12.5–16)
HEMOGLOBIN: 9.2 GM/DL (ref 12.5–16)
IMMATURE NEUTROPHIL %: 0.2 % (ref 0–0.43)
LYMPHOCYTES ABSOLUTE: 1.5 K/CU MM
LYMPHOCYTES RELATIVE PERCENT: 29.8 % (ref 24–44)
MAGNESIUM: 2.1 MG/DL (ref 1.8–2.4)
MCH RBC QN AUTO: 27.7 PG (ref 27–31)
MCHC RBC AUTO-ENTMCNC: 29.7 % (ref 32–36)
MCV RBC AUTO: 93.4 FL (ref 78–100)
MONOCYTES ABSOLUTE: 0.7 K/CU MM
MONOCYTES RELATIVE PERCENT: 13.5 % (ref 0–4)
NUCLEATED RBC %: 0 %
PDW BLD-RTO: 15.9 % (ref 11.7–14.9)
PLATELET # BLD: 148 K/CU MM (ref 140–440)
PMV BLD AUTO: 11.9 FL (ref 7.5–11.1)
POTASSIUM SERPL-SCNC: 4.2 MMOL/L (ref 3.5–5.1)
RBC # BLD: 3.03 M/CU MM (ref 4.2–5.4)
SEGMENTED NEUTROPHILS ABSOLUTE COUNT: 2.7 K/CU MM
SEGMENTED NEUTROPHILS RELATIVE PERCENT: 53.5 % (ref 36–66)
SODIUM BLD-SCNC: 144 MMOL/L (ref 135–145)
TOTAL IMMATURE NEUTOROPHIL: 0.01 K/CU MM
TOTAL NUCLEATED RBC: 0 K/CU MM
WBC # BLD: 5 K/CU MM (ref 4–10.5)

## 2019-11-11 PROCEDURE — 99215 OFFICE O/P EST HI 40 MIN: CPT | Performed by: SURGERY

## 2019-11-11 PROCEDURE — 6360000002 HC RX W HCPCS: Performed by: INTERNAL MEDICINE

## 2019-11-11 PROCEDURE — 99205 OFFICE O/P NEW HI 60 MIN: CPT | Performed by: PHYSICIAN ASSISTANT

## 2019-11-11 PROCEDURE — 2500000003 HC RX 250 WO HCPCS: Performed by: INTERNAL MEDICINE

## 2019-11-11 PROCEDURE — 83735 ASSAY OF MAGNESIUM: CPT

## 2019-11-11 PROCEDURE — 96374 THER/PROPH/DIAG INJ IV PUSH: CPT

## 2019-11-11 PROCEDURE — 85025 COMPLETE CBC W/AUTO DIFF WBC: CPT

## 2019-11-11 PROCEDURE — 6370000000 HC RX 637 (ALT 250 FOR IP): Performed by: INTERNAL MEDICINE

## 2019-11-11 PROCEDURE — 96376 TX/PRO/DX INJ SAME DRUG ADON: CPT

## 2019-11-11 PROCEDURE — 85018 HEMOGLOBIN: CPT

## 2019-11-11 PROCEDURE — 96361 HYDRATE IV INFUSION ADD-ON: CPT

## 2019-11-11 PROCEDURE — 36415 COLL VENOUS BLD VENIPUNCTURE: CPT

## 2019-11-11 PROCEDURE — 85014 HEMATOCRIT: CPT

## 2019-11-11 PROCEDURE — 80048 BASIC METABOLIC PNL TOTAL CA: CPT

## 2019-11-11 PROCEDURE — G0378 HOSPITAL OBSERVATION PER HR: HCPCS

## 2019-11-11 RX ADMIN — POTASSIUM CHLORIDE 40 MEQ: 750 TABLET, FILM COATED, EXTENDED RELEASE ORAL at 00:04

## 2019-11-11 RX ADMIN — POTASSIUM CHLORIDE 40 MEQ: 750 TABLET, FILM COATED, EXTENDED RELEASE ORAL at 08:22

## 2019-11-11 RX ADMIN — POTASSIUM CHLORIDE AND SODIUM CHLORIDE: 900; 300 INJECTION, SOLUTION INTRAVENOUS at 00:40

## 2019-11-11 RX ADMIN — FAMOTIDINE 20 MG: 10 INJECTION, SOLUTION INTRAVENOUS at 20:46

## 2019-11-11 RX ADMIN — FAMOTIDINE 20 MG: 10 INJECTION, SOLUTION INTRAVENOUS at 00:04

## 2019-11-11 RX ADMIN — FAMOTIDINE 20 MG: 10 INJECTION, SOLUTION INTRAVENOUS at 08:22

## 2019-11-11 ASSESSMENT — ENCOUNTER SYMPTOMS
NAUSEA: 0
SORE THROAT: 0
ABDOMINAL PAIN: 1
VOMITING: 0
PHOTOPHOBIA: 0
DIARRHEA: 0
COLOR CHANGE: 0
CONSTIPATION: 0
BLOOD IN STOOL: 1
APNEA: 0
BACK PAIN: 0
STRIDOR: 0
EYE ITCHING: 0
ANAL BLEEDING: 0
EYE REDNESS: 0
RECTAL PAIN: 0
CHOKING: 0

## 2019-11-12 ENCOUNTER — ANESTHESIA EVENT (OUTPATIENT)
Dept: ENDOSCOPY | Age: 74
End: 2019-11-12
Payer: MEDICARE

## 2019-11-12 LAB
ANION GAP SERPL CALCULATED.3IONS-SCNC: 10 MMOL/L (ref 4–16)
BUN BLDV-MCNC: 6 MG/DL (ref 6–23)
CALCIUM SERPL-MCNC: 9.4 MG/DL (ref 8.3–10.6)
CEA: 6.4 NG/ML
CHLORIDE BLD-SCNC: 107 MMOL/L (ref 99–110)
CO2: 27 MMOL/L (ref 21–32)
CREAT SERPL-MCNC: 0.9 MG/DL (ref 0.6–1.1)
GFR AFRICAN AMERICAN: >60 ML/MIN/1.73M2
GFR NON-AFRICAN AMERICAN: >60 ML/MIN/1.73M2
GLUCOSE BLD-MCNC: 98 MG/DL (ref 70–99)
HCT VFR BLD CALC: 29.9 % (ref 37–47)
HEMOGLOBIN: 8.9 GM/DL (ref 12.5–16)
MAGNESIUM: 2 MG/DL (ref 1.8–2.4)
PHOSPHORUS: 4.7 MG/DL (ref 2.5–4.9)
POTASSIUM SERPL-SCNC: 3.9 MMOL/L (ref 3.5–5.1)
SODIUM BLD-SCNC: 144 MMOL/L (ref 135–145)

## 2019-11-12 PROCEDURE — 2500000003 HC RX 250 WO HCPCS: Performed by: INTERNAL MEDICINE

## 2019-11-12 PROCEDURE — 96376 TX/PRO/DX INJ SAME DRUG ADON: CPT

## 2019-11-12 PROCEDURE — 85014 HEMATOCRIT: CPT

## 2019-11-12 PROCEDURE — 6360000002 HC RX W HCPCS: Performed by: INTERNAL MEDICINE

## 2019-11-12 PROCEDURE — G0378 HOSPITAL OBSERVATION PER HR: HCPCS

## 2019-11-12 PROCEDURE — G0008 ADMIN INFLUENZA VIRUS VAC: HCPCS | Performed by: INTERNAL MEDICINE

## 2019-11-12 PROCEDURE — 84100 ASSAY OF PHOSPHORUS: CPT

## 2019-11-12 PROCEDURE — 80048 BASIC METABOLIC PNL TOTAL CA: CPT

## 2019-11-12 PROCEDURE — 6370000000 HC RX 637 (ALT 250 FOR IP): Performed by: INTERNAL MEDICINE

## 2019-11-12 PROCEDURE — 82378 CARCINOEMBRYONIC ANTIGEN: CPT

## 2019-11-12 PROCEDURE — 6360000002 HC RX W HCPCS: Performed by: HOSPITALIST

## 2019-11-12 PROCEDURE — 36415 COLL VENOUS BLD VENIPUNCTURE: CPT

## 2019-11-12 PROCEDURE — 83735 ASSAY OF MAGNESIUM: CPT

## 2019-11-12 PROCEDURE — 96375 TX/PRO/DX INJ NEW DRUG ADDON: CPT

## 2019-11-12 PROCEDURE — 85018 HEMOGLOBIN: CPT

## 2019-11-12 PROCEDURE — 90686 IIV4 VACC NO PRSV 0.5 ML IM: CPT | Performed by: INTERNAL MEDICINE

## 2019-11-12 RX ORDER — BISACODYL 10 MG
10 SUPPOSITORY, RECTAL RECTAL ONCE
Status: DISCONTINUED | OUTPATIENT
Start: 2019-11-12 | End: 2019-11-12

## 2019-11-12 RX ORDER — ONDANSETRON 2 MG/ML
4 INJECTION INTRAMUSCULAR; INTRAVENOUS EVERY 6 HOURS PRN
Status: DISCONTINUED | OUTPATIENT
Start: 2019-11-12 | End: 2019-11-13 | Stop reason: HOSPADM

## 2019-11-12 RX ADMIN — BISACODYL 10 MG: 5 TABLET, COATED ORAL at 11:55

## 2019-11-12 RX ADMIN — FAMOTIDINE 20 MG: 10 INJECTION, SOLUTION INTRAVENOUS at 10:51

## 2019-11-12 RX ADMIN — MAGNESIUM CITRATE 296 ML: 1.75 LIQUID ORAL at 11:55

## 2019-11-12 RX ADMIN — INFLUENZA A VIRUS A/BRISBANE/02/2018 IVR-190 (H1N1) ANTIGEN (PROPIOLACTONE INACTIVATED), INFLUENZA A VIRUS A/KANSAS/14/2017 X-327 (H3N2) ANTIGEN (PROPIOLACTONE INACTIVATED), INFLUENZA B VIRUS B/MARYLAND/15/2016 ANTIGEN (PROPIOLACTONE INACTIVATED), INFLUENZA B VIRUS B/PHUKET/3073/2013 BVR-1B ANTIGEN (PROPIOLACTONE INACTIVATED) 0.5 ML: 15; 15; 15; 15 INJECTION, SUSPENSION INTRAMUSCULAR at 10:51

## 2019-11-12 RX ADMIN — FAMOTIDINE 20 MG: 10 INJECTION, SOLUTION INTRAVENOUS at 21:30

## 2019-11-12 RX ADMIN — ONDANSETRON 4 MG: 2 INJECTION INTRAMUSCULAR; INTRAVENOUS at 17:30

## 2019-11-12 ASSESSMENT — PAIN SCALES - GENERAL
PAINLEVEL_OUTOF10: 0
PAINLEVEL_OUTOF10: 0

## 2019-11-13 ENCOUNTER — ANESTHESIA (OUTPATIENT)
Dept: ENDOSCOPY | Age: 74
End: 2019-11-13
Payer: MEDICARE

## 2019-11-13 VITALS — SYSTOLIC BLOOD PRESSURE: 119 MMHG | OXYGEN SATURATION: 100 % | DIASTOLIC BLOOD PRESSURE: 57 MMHG

## 2019-11-13 VITALS
TEMPERATURE: 98 F | OXYGEN SATURATION: 100 % | SYSTOLIC BLOOD PRESSURE: 121 MMHG | HEART RATE: 70 BPM | BODY MASS INDEX: 26.27 KG/M2 | DIASTOLIC BLOOD PRESSURE: 76 MMHG | RESPIRATION RATE: 16 BRPM | HEIGHT: 64 IN | WEIGHT: 153.9 LBS

## 2019-11-13 PROCEDURE — 2709999900 HC NON-CHARGEABLE SUPPLY: Performed by: INTERNAL MEDICINE

## 2019-11-13 PROCEDURE — 2500000003 HC RX 250 WO HCPCS: Performed by: NURSE ANESTHETIST, CERTIFIED REGISTERED

## 2019-11-13 PROCEDURE — G0378 HOSPITAL OBSERVATION PER HR: HCPCS

## 2019-11-13 PROCEDURE — 6360000002 HC RX W HCPCS: Performed by: NURSE ANESTHETIST, CERTIFIED REGISTERED

## 2019-11-13 PROCEDURE — 99225 PR SBSQ OBSERVATION CARE/DAY 25 MINUTES: CPT | Performed by: PHYSICIAN ASSISTANT

## 2019-11-13 PROCEDURE — 2580000003 HC RX 258

## 2019-11-13 PROCEDURE — 3700000000 HC ANESTHESIA ATTENDED CARE: Performed by: INTERNAL MEDICINE

## 2019-11-13 PROCEDURE — 2500000003 HC RX 250 WO HCPCS: Performed by: INTERNAL MEDICINE

## 2019-11-13 PROCEDURE — 3700000001 HC ADD 15 MINUTES (ANESTHESIA): Performed by: INTERNAL MEDICINE

## 2019-11-13 PROCEDURE — 6360000002 HC RX W HCPCS: Performed by: PHYSICIAN ASSISTANT

## 2019-11-13 PROCEDURE — 6370000000 HC RX 637 (ALT 250 FOR IP): Performed by: INTERNAL MEDICINE

## 2019-11-13 PROCEDURE — 96376 TX/PRO/DX INJ SAME DRUG ADON: CPT

## 2019-11-13 PROCEDURE — 3609009600 HC COLONOSCOPY STOMA DX INCLUDING COLLJ SPEC SPX: Performed by: INTERNAL MEDICINE

## 2019-11-13 PROCEDURE — 2580000003 HC RX 258: Performed by: NURSE ANESTHETIST, CERTIFIED REGISTERED

## 2019-11-13 RX ORDER — SODIUM CHLORIDE, SODIUM LACTATE, POTASSIUM CHLORIDE, CALCIUM CHLORIDE 600; 310; 30; 20 MG/100ML; MG/100ML; MG/100ML; MG/100ML
INJECTION, SOLUTION INTRAVENOUS CONTINUOUS PRN
Status: DISCONTINUED | OUTPATIENT
Start: 2019-11-13 | End: 2019-11-13 | Stop reason: SDUPTHER

## 2019-11-13 RX ORDER — HEPARIN SODIUM (PORCINE) LOCK FLUSH IV SOLN 100 UNIT/ML 100 UNIT/ML
500 SOLUTION INTRAVENOUS PRN
Status: DISCONTINUED | OUTPATIENT
Start: 2019-11-13 | End: 2019-11-13 | Stop reason: HOSPADM

## 2019-11-13 RX ORDER — SODIUM CHLORIDE, SODIUM LACTATE, POTASSIUM CHLORIDE, CALCIUM CHLORIDE 600; 310; 30; 20 MG/100ML; MG/100ML; MG/100ML; MG/100ML
INJECTION, SOLUTION INTRAVENOUS ONCE
Status: COMPLETED | OUTPATIENT
Start: 2019-11-13 | End: 2019-11-13

## 2019-11-13 RX ORDER — LIDOCAINE HYDROCHLORIDE 20 MG/ML
INJECTION, SOLUTION EPIDURAL; INFILTRATION; INTRACAUDAL; PERINEURAL PRN
Status: DISCONTINUED | OUTPATIENT
Start: 2019-11-13 | End: 2019-11-13 | Stop reason: SDUPTHER

## 2019-11-13 RX ORDER — PROPOFOL 10 MG/ML
INJECTION, EMULSION INTRAVENOUS PRN
Status: DISCONTINUED | OUTPATIENT
Start: 2019-11-13 | End: 2019-11-13 | Stop reason: SDUPTHER

## 2019-11-13 RX ORDER — SODIUM CHLORIDE, SODIUM LACTATE, POTASSIUM CHLORIDE, CALCIUM CHLORIDE 600; 310; 30; 20 MG/100ML; MG/100ML; MG/100ML; MG/100ML
INJECTION, SOLUTION INTRAVENOUS
Status: COMPLETED
Start: 2019-11-13 | End: 2019-11-13

## 2019-11-13 RX ADMIN — PROPOFOL 40 MG: 10 INJECTION, EMULSION INTRAVENOUS at 14:46

## 2019-11-13 RX ADMIN — PROPOFOL 10 MG: 10 INJECTION, EMULSION INTRAVENOUS at 14:56

## 2019-11-13 RX ADMIN — PROPOFOL 20 MG: 10 INJECTION, EMULSION INTRAVENOUS at 14:53

## 2019-11-13 RX ADMIN — FAMOTIDINE 20 MG: 10 INJECTION, SOLUTION INTRAVENOUS at 10:48

## 2019-11-13 RX ADMIN — SODIUM CHLORIDE, POTASSIUM CHLORIDE, SODIUM LACTATE AND CALCIUM CHLORIDE: 600; 310; 30; 20 INJECTION, SOLUTION INTRAVENOUS at 14:27

## 2019-11-13 RX ADMIN — MAGNESIUM CITRATE 296 ML: 1.75 LIQUID ORAL at 10:48

## 2019-11-13 RX ADMIN — PROPOFOL 20 MG: 10 INJECTION, EMULSION INTRAVENOUS at 14:49

## 2019-11-13 RX ADMIN — LIDOCAINE HYDROCHLORIDE 200 MG: 20 INJECTION, SOLUTION EPIDURAL; INFILTRATION; INTRACAUDAL; PERINEURAL at 14:46

## 2019-11-13 RX ADMIN — SODIUM CHLORIDE, PRESERVATIVE FREE 500 UNITS: 5 INJECTION INTRAVENOUS at 20:02

## 2019-11-13 RX ADMIN — SODIUM CHLORIDE, POTASSIUM CHLORIDE, SODIUM LACTATE AND CALCIUM CHLORIDE: 600; 310; 30; 20 INJECTION, SOLUTION INTRAVENOUS at 14:46

## 2019-11-13 RX ADMIN — SODIUM CHLORIDE, SODIUM LACTATE, POTASSIUM CHLORIDE, CALCIUM CHLORIDE: 600; 310; 30; 20 INJECTION, SOLUTION INTRAVENOUS at 14:27

## 2019-11-13 ASSESSMENT — ENCOUNTER SYMPTOMS
CHOKING: 0
CONSTIPATION: 0
EYE ITCHING: 0
RECTAL PAIN: 0
APNEA: 0
STRIDOR: 0
EYE REDNESS: 0
PHOTOPHOBIA: 0
BACK PAIN: 0
ANAL BLEEDING: 0
SORE THROAT: 0
COLOR CHANGE: 0

## 2019-11-14 LAB
ABO/RH: NORMAL
ANTIBODY DATA: NORMAL
ANTIBODY SCREEN: POSITIVE
ANTIGEN DATA: NORMAL
COMPONENT: NORMAL
COMPONENT: NORMAL
CROSSMATCH RESULT: NORMAL
CROSSMATCH RESULT: NORMAL
STATUS: NORMAL
STATUS: NORMAL
TRANSFUSION STATUS: NORMAL
TRANSFUSION STATUS: NORMAL
UNIT DIVISION: 0
UNIT DIVISION: 0
UNIT NUMBER: NORMAL
UNIT NUMBER: NORMAL

## 2020-01-06 ENCOUNTER — HOSPITAL ENCOUNTER (OUTPATIENT)
Age: 75
Discharge: HOME | End: 2020-01-06
Payer: MEDICARE

## 2020-01-06 ENCOUNTER — HOSPITAL ENCOUNTER (INPATIENT)
Age: 75
LOS: 1 days | Discharge: HOME | DRG: 375 | End: 2020-01-07
Attending: EMERGENCY MEDICINE | Admitting: INTERNAL MEDICINE
Payer: MEDICARE

## 2020-01-06 PROBLEM — D64.9 ACUTE ON CHRONIC ANEMIA: Status: ACTIVE | Noted: 2020-01-06

## 2020-01-06 LAB
ALBUMIN SERPL-MCNC: 3.5 GM/DL (ref 3.4–5)
ALBUMIN SERPL-MCNC: 3.8 GM/DL (ref 3.4–5)
ALP BLD-CCNC: 256 IU/L (ref 40–129)
ALP BLD-CCNC: 272 IU/L (ref 40–129)
ALT SERPL-CCNC: 16 U/L (ref 10–40)
ALT SERPL-CCNC: 18 U/L (ref 10–40)
ANION GAP SERPL CALCULATED.3IONS-SCNC: 9 MMOL/L (ref 4–16)
ANION GAP SERPL CALCULATED.3IONS-SCNC: 9 MMOL/L (ref 4–16)
AST SERPL-CCNC: 27 IU/L (ref 15–37)
AST SERPL-CCNC: 29 IU/L (ref 15–37)
BASOPHILS ABSOLUTE: 0 K/CU MM
BASOPHILS ABSOLUTE: 0 K/CU MM
BASOPHILS RELATIVE PERCENT: 0.4 % (ref 0–1)
BASOPHILS RELATIVE PERCENT: 0.5 % (ref 0–1)
BILIRUB SERPL-MCNC: 0.2 MG/DL (ref 0–1)
BILIRUB SERPL-MCNC: 0.2 MG/DL (ref 0–1)
BUN BLDV-MCNC: 17 MG/DL (ref 6–23)
BUN BLDV-MCNC: 19 MG/DL (ref 6–23)
CALCIUM SERPL-MCNC: 9 MG/DL (ref 8.3–10.6)
CALCIUM SERPL-MCNC: 9.5 MG/DL (ref 8.3–10.6)
CEA: 47.3 NG/ML
CHLORIDE BLD-SCNC: 104 MMOL/L (ref 99–110)
CHLORIDE BLD-SCNC: 106 MMOL/L (ref 99–110)
CO2: 24 MMOL/L (ref 21–32)
CO2: 28 MMOL/L (ref 21–32)
CREAT SERPL-MCNC: 1.2 MG/DL (ref 0.6–1.1)
CREAT SERPL-MCNC: 1.3 MG/DL (ref 0.6–1.1)
DIFFERENTIAL TYPE: ABNORMAL
EOSINOPHILS ABSOLUTE: 0.3 K/CU MM
EOSINOPHILS ABSOLUTE: 0.3 K/CU MM
EOSINOPHILS RELATIVE PERCENT: 4 % (ref 0–3)
EOSINOPHILS RELATIVE PERCENT: 4.3 % (ref 0–3)
FERRITIN: 8 NG/ML (ref 15–150)
FOLATE: >20 NG/ML (ref 3.1–17.5)
GFR AFRICAN AMERICAN: 49 ML/MIN/1.73M2
GFR AFRICAN AMERICAN: 53 ML/MIN/1.73M2
GFR NON-AFRICAN AMERICAN: 40 ML/MIN/1.73M2
GFR NON-AFRICAN AMERICAN: 44 ML/MIN/1.73M2
GLUCOSE BLD-MCNC: 110 MG/DL (ref 70–99)
GLUCOSE FASTING: 113 MG/DL (ref 70–99)
HCT VFR BLD CALC: 18.6 % (ref 37–47)
HCT VFR BLD CALC: 20.2 % (ref 37–47)
HCT VFR BLD CALC: ABNORMAL % (ref 37–47)
HEMOGLOBIN: ABNORMAL GM/DL (ref 12.5–16)
HYPOCHROMIA: ABNORMAL
IMMATURE NEUTROPHIL %: 0.3 % (ref 0–0.43)
IMMATURE NEUTROPHIL %: 0.3 % (ref 0–0.43)
IRON: 10 UG/DL (ref 37–145)
LYMPHOCYTES ABSOLUTE: 1.5 K/CU MM
LYMPHOCYTES ABSOLUTE: 1.6 K/CU MM
LYMPHOCYTES ABSOLUTE: 1.8 K/CU MM
LYMPHOCYTES RELATIVE PERCENT: 19.8 % (ref 24–44)
LYMPHOCYTES RELATIVE PERCENT: 22.8 % (ref 24–44)
LYMPHOCYTES RELATIVE PERCENT: 25 % (ref 24–44)
MAGNESIUM: 2.4 MG/DL (ref 1.8–2.4)
MCH RBC QN AUTO: 25.1 PG (ref 27–31)
MCH RBC QN AUTO: 25.1 PG (ref 27–31)
MCH RBC QN AUTO: 25.4 PG (ref 27–31)
MCHC RBC AUTO-ENTMCNC: 28.2 % (ref 32–36)
MCHC RBC AUTO-ENTMCNC: 28.5 % (ref 32–36)
MCHC RBC AUTO-ENTMCNC: 28.7 % (ref 32–36)
MCV RBC AUTO: 88.2 FL (ref 78–100)
MCV RBC AUTO: 88.3 FL (ref 78–100)
MCV RBC AUTO: 89 FL (ref 78–100)
MONOCYTES ABSOLUTE: 0.7 K/CU MM
MONOCYTES ABSOLUTE: 0.9 K/CU MM
MONOCYTES ABSOLUTE: 1 K/CU MM
MONOCYTES RELATIVE PERCENT: 10 % (ref 0–4)
MONOCYTES RELATIVE PERCENT: 12.5 % (ref 0–4)
MONOCYTES RELATIVE PERCENT: 12.6 % (ref 0–4)
NUCLEATED RBC %: 0 %
NUCLEATED RBC %: 0 %
PCT TRANSFERRIN: 2 % (ref 10–44)
PDW BLD-RTO: 13.5 % (ref 11.7–14.9)
PLATELET # BLD: 243 K/CU MM (ref 140–440)
PLATELET # BLD: 246 K/CU MM (ref 140–440)
PLATELET # BLD: 263 K/CU MM (ref 140–440)
PMV BLD AUTO: 10.7 FL (ref 7.5–11.1)
PMV BLD AUTO: 10.7 FL (ref 7.5–11.1)
PMV BLD AUTO: 11.2 FL (ref 7.5–11.1)
POTASSIUM SERPL-SCNC: 4.2 MMOL/L (ref 3.5–5.1)
POTASSIUM SERPL-SCNC: 4.4 MMOL/L (ref 3.5–5.1)
RBC # BLD: 2.11 M/CU MM (ref 4.2–5.4)
RBC # BLD: 2.13 M/CU MM (ref 4.2–5.4)
RBC # BLD: 2.27 M/CU MM (ref 4.2–5.4)
RETICULOCYTE COUNT PCT: 3 % (ref 0.2–2.2)
SEGMENTED NEUTROPHILS ABSOLUTE COUNT: 4.2 K/CU MM
SEGMENTED NEUTROPHILS ABSOLUTE COUNT: 4.8 K/CU MM
SEGMENTED NEUTROPHILS ABSOLUTE COUNT: 4.8 K/CU MM
SEGMENTED NEUTROPHILS RELATIVE PERCENT: 60 % (ref 36–66)
SEGMENTED NEUTROPHILS RELATIVE PERCENT: 62.5 % (ref 36–66)
SEGMENTED NEUTROPHILS RELATIVE PERCENT: 65 % (ref 36–66)
SODIUM BLD-SCNC: 137 MMOL/L (ref 135–145)
SODIUM BLD-SCNC: 143 MMOL/L (ref 135–145)
TOTAL IMMATURE NEUTOROPHIL: 0.02 K/CU MM
TOTAL IMMATURE NEUTOROPHIL: 0.02 K/CU MM
TOTAL IRON BINDING CAPACITY: 421 UG/DL (ref 250–450)
TOTAL NUCLEATED RBC: 0 K/CU MM
TOTAL NUCLEATED RBC: 0 K/CU MM
TOTAL PROTEIN: 7 GM/DL (ref 6.4–8.2)
TOTAL PROTEIN: 7.5 GM/DL (ref 6.4–8.2)
UNSATURATED IRON BINDING CAPACITY: 411 UG/DL (ref 110–370)
VITAMIN B-12: 287.9 PG/ML (ref 211–911)
WBC # BLD: 7 K/CU MM (ref 4–10.5)
WBC # BLD: 7.3 K/CU MM (ref 4–10.5)
WBC # BLD: 7.6 K/CU MM (ref 4–10.5)

## 2020-01-06 PROCEDURE — 82607 VITAMIN B-12: CPT

## 2020-01-06 PROCEDURE — 86922 COMPATIBILITY TEST ANTIGLOB: CPT

## 2020-01-06 PROCEDURE — 86850 RBC ANTIBODY SCREEN: CPT

## 2020-01-06 PROCEDURE — 36415 COLL VENOUS BLD VENIPUNCTURE: CPT

## 2020-01-06 PROCEDURE — 85027 COMPLETE CBC AUTOMATED: CPT

## 2020-01-06 PROCEDURE — 86900 BLOOD TYPING SEROLOGIC ABO: CPT

## 2020-01-06 PROCEDURE — 85007 BL SMEAR W/DIFF WBC COUNT: CPT

## 2020-01-06 PROCEDURE — 85025 COMPLETE CBC W/AUTO DIFF WBC: CPT

## 2020-01-06 PROCEDURE — 6370000000 HC RX 637 (ALT 250 FOR IP): Performed by: NURSE PRACTITIONER

## 2020-01-06 PROCEDURE — 99284 EMERGENCY DEPT VISIT MOD MDM: CPT

## 2020-01-06 PROCEDURE — 83735 ASSAY OF MAGNESIUM: CPT

## 2020-01-06 PROCEDURE — 86905 BLOOD TYPING RBC ANTIGENS: CPT

## 2020-01-06 PROCEDURE — 2060000000 HC ICU INTERMEDIATE R&B

## 2020-01-06 PROCEDURE — C9113 INJ PANTOPRAZOLE SODIUM, VIA: HCPCS | Performed by: NURSE PRACTITIONER

## 2020-01-06 PROCEDURE — 82746 ASSAY OF FOLIC ACID SERUM: CPT

## 2020-01-06 PROCEDURE — 83540 ASSAY OF IRON: CPT

## 2020-01-06 PROCEDURE — 86901 BLOOD TYPING SEROLOGIC RH(D): CPT

## 2020-01-06 PROCEDURE — 85045 AUTOMATED RETICULOCYTE COUNT: CPT

## 2020-01-06 PROCEDURE — 36430 TRANSFUSION BLD/BLD COMPNT: CPT

## 2020-01-06 PROCEDURE — 80053 COMPREHEN METABOLIC PANEL: CPT

## 2020-01-06 PROCEDURE — 82378 CARCINOEMBRYONIC ANTIGEN: CPT

## 2020-01-06 PROCEDURE — P9016 RBC LEUKOCYTES REDUCED: HCPCS

## 2020-01-06 PROCEDURE — 6360000002 HC RX W HCPCS: Performed by: NURSE PRACTITIONER

## 2020-01-06 PROCEDURE — 2580000003 HC RX 258: Performed by: NURSE PRACTITIONER

## 2020-01-06 PROCEDURE — 82728 ASSAY OF FERRITIN: CPT

## 2020-01-06 PROCEDURE — 83550 IRON BINDING TEST: CPT

## 2020-01-06 PROCEDURE — 86870 RBC ANTIBODY IDENTIFICATION: CPT

## 2020-01-06 RX ORDER — SODIUM CHLORIDE 0.9 % (FLUSH) 0.9 %
10 SYRINGE (ML) INJECTION EVERY 12 HOURS SCHEDULED
Status: DISCONTINUED | OUTPATIENT
Start: 2020-01-06 | End: 2020-01-07 | Stop reason: HOSPADM

## 2020-01-06 RX ORDER — SODIUM CHLORIDE 0.9 % (FLUSH) 0.9 %
10 SYRINGE (ML) INJECTION PRN
Status: DISCONTINUED | OUTPATIENT
Start: 2020-01-06 | End: 2020-01-07 | Stop reason: HOSPADM

## 2020-01-06 RX ORDER — 0.9 % SODIUM CHLORIDE 0.9 %
250 INTRAVENOUS SOLUTION INTRAVENOUS ONCE
Status: DISCONTINUED | OUTPATIENT
Start: 2020-01-06 | End: 2020-01-07 | Stop reason: HOSPADM

## 2020-01-06 RX ORDER — SIMVASTATIN 40 MG
40 TABLET ORAL NIGHTLY
Status: DISCONTINUED | OUTPATIENT
Start: 2020-01-06 | End: 2020-01-07 | Stop reason: HOSPADM

## 2020-01-06 RX ORDER — ONDANSETRON 2 MG/ML
4 INJECTION INTRAMUSCULAR; INTRAVENOUS EVERY 6 HOURS PRN
Status: DISCONTINUED | OUTPATIENT
Start: 2020-01-06 | End: 2020-01-07 | Stop reason: HOSPADM

## 2020-01-06 RX ORDER — DEXAMETHASONE SODIUM PHOSPHATE 4 MG/ML
10 INJECTION, SOLUTION INTRA-ARTICULAR; INTRALESIONAL; INTRAMUSCULAR; INTRAVENOUS; SOFT TISSUE ONCE
Status: CANCELLED | OUTPATIENT
Start: 2020-01-06

## 2020-01-06 RX ORDER — ACETAMINOPHEN 325 MG/1
650 TABLET ORAL EVERY 4 HOURS PRN
Status: DISCONTINUED | OUTPATIENT
Start: 2020-01-06 | End: 2020-01-07 | Stop reason: HOSPADM

## 2020-01-06 RX ORDER — LISINOPRIL 20 MG/1
20 TABLET ORAL DAILY
COMMUNITY
End: 2020-10-20 | Stop reason: SDUPTHER

## 2020-01-06 RX ORDER — LISINOPRIL 10 MG/1
20 TABLET ORAL ONCE
Status: DISCONTINUED | OUTPATIENT
Start: 2020-01-06 | End: 2020-01-07 | Stop reason: HOSPADM

## 2020-01-06 RX ORDER — PANTOPRAZOLE SODIUM 40 MG/10ML
40 INJECTION, POWDER, LYOPHILIZED, FOR SOLUTION INTRAVENOUS 2 TIMES DAILY
Status: DISCONTINUED | OUTPATIENT
Start: 2020-01-06 | End: 2020-01-07 | Stop reason: HOSPADM

## 2020-01-06 RX ORDER — SODIUM CHLORIDE 9 MG/ML
INJECTION, SOLUTION INTRAVENOUS CONTINUOUS
Status: DISCONTINUED | OUTPATIENT
Start: 2020-01-06 | End: 2020-01-07 | Stop reason: HOSPADM

## 2020-01-06 RX ADMIN — PANTOPRAZOLE SODIUM 40 MG: 40 INJECTION, POWDER, FOR SOLUTION INTRAVENOUS at 22:50

## 2020-01-06 RX ADMIN — SODIUM CHLORIDE, PRESERVATIVE FREE 10 ML: 5 INJECTION INTRAVENOUS at 22:51

## 2020-01-06 RX ADMIN — SODIUM CHLORIDE: 9 INJECTION, SOLUTION INTRAVENOUS at 22:50

## 2020-01-06 RX ADMIN — SIMVASTATIN 40 MG: 40 TABLET, FILM COATED ORAL at 22:50

## 2020-01-06 ASSESSMENT — PAIN SCALES - GENERAL: PAINLEVEL_OUTOF10: 0

## 2020-01-06 NOTE — ED NOTES
Pt updated on blood ordered from [facility], [facility] blood bank and timeline of arrival and transfusion time.       Kimo Eagle RN  01/06/20 [id number]

## 2020-01-06 NOTE — H&P
History and Physical      Name:  Nicole Hatfield /Age/Sex: 1945  (63 y.o. female)   MRN & CSN:  [id number] & [id number] Admission Date/Time: 2020  2:50 PM   Location:  ED34/ED-34 PCP: Mary Anne Ariza MD       Hospital Day: 1    Discussed patient and POC with Dr. Charlaine Mortimer and Plan:     Nicole Hatfield is a 69 y.o.  female  who presents with abnormal labs    - Anemia, acute on chronic  Hgb 5.4 (baseline ~ 8)  ? melanotic output from colostomy (is also taking pepto bismol)  Admission in 2019 for carlos enrique GI bleed--Consult to GI, Dr Courtney Medeiros and cross transfuse 2 U PRBC  Post transfusion H & H   Protonix IV BID  Admit to step-down due to hgb < 6  Anemia panel   Holding ASA & pharmacologic dvt ppx    - AGUSTIN  Creat 1.2 (baseline 0.9)  IVF and recheck in AM      Chronic  - Malignancy neoplasm sigmoid colon: Sees Dr Sly Hernandez; is due to restart chemo soon > 3 months off  - HLD- Statin  - CAD s/p CABG- Hold ASA due to above     Discussed patient with ED physician      Attending addendum: She was diagnosed with stage IIIC sigmoid colon cancer and she is s/p surgery on 10/7/17. Since she was found to have stage IIIC disease, and apparently had adjuvant chemotherapy  with FOLFOX as far as I know, will consult oncology. She is currently has severe TEQUILA and needs iron infusions. Will cs oncology.   -Dr. Florentino Larry;    DVT Prophylaxis [] Lovenox, []  Heparin, [x] SCDs, [] Ambulation   GI Prophylaxis [x] PPI,  [] H2 Blocker,  [] Carafate,  [] Diet/Tube Feeds   Code Status Full    Disposition Patient requires continued admission due to anemia; ?  Gi bleed   MDM [] Low, [] Moderate,[x]  High  Patient's risk as above due to      [x] One or more chronic illnesses with exacerbation progression      [x] Two or more stable chronic illnesses      [x] Undiagnosed new problem with uncertain prognosis      [] Elective major surgery      []Prescription drug management     History of Present Illness:     Chief Complaint: Abnormal lab results    Pierre Anderson is a 69 y.o.  female  who presents from home with abnormal lab results. Context is, patient has hx of colon cancer, was receiving chemo until 9/2019, and was set to resume chemo soon. As a result, she had outpatient lab work completed and was found to have a hgb of 5.4 and advised to come to hospital for transfusion. She was admitted in 11/2019 with a GI bleed from existing colostomy. She denies carlos enrique blood in ostomy; she does report dark colostomy output; however, she states she has also been using pepto bismol recently. Denies hematemesis, n/v, decreased ostomy output. She endorses increased fatigue last 4 weeks. Denies chest pain/pressure, sob. Confirms code status. Denies pain. Ten point ROS reviewed negative, unless as noted above    Objective:   No intake or output data in the 24 hours ending 01/06/20 1730   Vitals:   Vitals:    01/06/20 1630   BP: 139/63   Pulse: 74   Resp: 17   Temp: 98.7   SpO2: 100%     Physical Exam:   GEN Awake female, sitting upright in bed in no apparent distress. Appears given age. EYES Pupils are 3mm and PERRLA bilat. EOMs intact. No scleral erythema, discharge, or conjunctivitis. HENT Mucous membranes are moist. Oral pharynx without exudates, no evidence of thrush. NECK Supple, no apparent thyromegaly or masses. RESP Clear to auscultation, no wheezes, rales or rhonchi. Symmetric chest movement while on room air. CARDIO/VASC S1/S2 auscultated. Regular rate without appreciable murmurs, rubs, or gallops. No JVD or carotid bruits. Peripheral pulses equal bilaterally and palpable. No peripheral edema. GI Colostomy present with small amount of non-bloody output present. Abdomen is soft without significant tenderness, masses, or guarding. Bowel sounds are normoactive. Rectal exam deferred.  No costovertebral angle tenderness. Gambino catheter is not present.   HEME/LYMPH No palpable cervical lymphadenopathy and no hepatosplenomegaly. No petechiae or ecchymoses. MSK No gross joint deformities. Equal strength bilaterally to upper and lower extremities  SKIN Normal coloration, warm, dry. NEURO Cranial nerves appear grossly intact, normal speech, no lateralizing weakness. Sensation intact and equal bilaterally  PSYCH Awake, alert, oriented x 4. Affect appropriate. Past Medical History:      Past Medical History:   Diagnosis Date    Abnormal CT scan     \"4/10/2019\"found spot on the lung and liver\"     Abnormal finding on EKG     Acid reflux     Aortic stenosis     Aortic valve prosthesis present 8/12/2014 7/30/14 Dr Noemi Vera #23 [employer] tissue valve     AR (aortic regurgitation)     Arthritis     \"Hands Mostly\"    CAD (coronary artery disease)     S/P CABG x1 7/2014-follows with Dr Nava Albrecht [facility])     colon\"dx in 10/2017- tx with surg and chemo- following with Dr Anita Sandra [facility])     COPD, mild ([address] 9/6/2018    Diabetes mellitus ([facility]     \"at one time was borderline- but never on medication- and no one said anything about me having this at present\"    Family history of coronary artery disease     Fatigue     H/O 24 hour EKG monitoring 05/03/2018    48-hour Holter monitor showed normal sinus rhythm average rate is 72 bpm lowest rate of 52 occurred at 2:20 AM fastest rate of 104 occurred at 6 PM.  No significant ventricular or supraventricular ectopy is noted. Patient submitted a diary did not report any activities or symptoms during the monitoring    H/O cardiovascular stress test 2/7/12 2/12- Normal study-EF 70%    H/O echocardiogram 5/29/19; 05/22/2018    LV function and size normal w/mild asymmetric LV hypertrophy of septal wall, no regional wall motion abnormalities, diastolic dysfunction Grade I, all chamber dimensions WNL, possible stenosis of the bioprosthetic aortic valve w/mean pressure of 23 mmHg, mild TR, RVSP= 26 mmHg, EF 55-60%.   Compared to previous echo, prosthetic AOV mean pressure shows slight decrease from 25 to 23 mmHg.  Heart murmur     [place] (hard of hearing)     Bilateral Ears    Hyperlipidemia     Hypertension     \"use to be on b/p medication and took it untill 10/2017 and they never put me back on it\"    MR (congenital mitral regurgitation)     Multiple lung nodules on CT 3/7/2019    Nocturnal hypoxemia 11/3/2017    Pleural effusion     per old chart pt dx with large pleural effusion and had thoracentesis and chest tube placement done 10/22/2017 and then on 10/26/2017 had thoracentesis and removal of chest tube - also had pt had intrapleural installation of tPA 10/24/2017    Pneumonia Dx 1990's    Prolonged emergence from general anesthesia     \"alittle trouble waking me up\"    S/P CABG x 1 8/12/2014 7/30/14 [facility]- LAD Dr January Shah Teeth missing     Lower    Wears glasses      PSHX:  has a past surgical history that includes Dental surgery; Breast lumpectomy (Right, 1980's); Coronary artery bypass graft (7/30/14); Diagnostic Cardiac Cath Lab Procedure (7/28/14); Abdomen surgery; other surgical history (10/07/2017); Cardiac valve replacement (7/30/14); Tunneled venous port placement (1025/2017); Hysterectomy, total abdominal (1980's); and Colonoscopy (N/A, 11/13/2019). Allergies: Allergies   Allergen Reactions    Prilosec [Omeprazole]      \"Got Real Lightheaded\"    Pravachol [Pravastatin Sodium]      \"I Don't Remember The Reaction\"       FAM HX: family history includes Arthritis in her mother; Cancer in her father; Depression in her mother; Hearing Loss in her mother; Heart Disease in her brother; Migraines in her son.   Soc HX:   Social History     Socioeconomic History    Marital status:      Spouse name: None    Number of children: None    Years of education: None    Highest education level: None   Occupational History    None   Social Needs    Financial resource strain: None    Food insecurity:     Worry: None     Inability: None  Transportation needs:     Medical: None     Non-medical: None   Tobacco Use    Smoking status: Never Smoker    Smokeless tobacco: Never Used    Tobacco comment: Reviewed   Substance and Sexual Activity    Alcohol use: No    Drug use: No    Sexual activity: Yes     Partners: Male     Comment:    Lifestyle    Physical activity:     Days per week: None     Minutes per session: None    Stress: None   Relationships    Social connections:     Talks on phone: None     Gets together: None     Attends [employer]ism service: None     Active member of club or organization: None     Attends meetings of clubs or organizations: None     Relationship status: None    Intimate partner violence:     Fear of current or ex partner: None     Emotionally abused: None     Physically abused: None     Forced sexual activity: None   Other Topics Concern    None   Social History Narrative            Never use alcohol        Never use tobacco        Never use drugs        Caffeine 2 cups of coffee per day 1 soda every day Iced tea        Retired [employer] worker               Medications:   Medications:    sodium chloride  250 mL Intravenous Once      Apoaequorin (PREVAGEN PO) Take by mouth Historical MD Alejandro Needs Review   potassium chloride (KLOR-CON M) 20 MEQ extended release tablet Take 1 tablet by mouth 2 times daily Thaddeus Brady MD Needs Review   Ostomy Supplies (OSTOMY DRAINABLE POUCH/FLANGE) MISC 1 device replaced twice weekly prn Armida Flores MD Needs Review   aspirin 325 MG tablet Take 325 mg by mouth daily Polly Allen MD Needs Review   Multiple Vitamins-Minerals (MULTIVITAMIN PO) Take by mouth daily Polly Allen MD Needs Review   simvastatin (ZOCOR) 40 MG tablet Take 40 mg by mouth nightly.   Polly Allen MD Needs Review   Calcium Carbonate-Vitamin D (CALCIUM + D) 600-200 MG-UNIT TABS Take 1 tablet by mouth every morning.  Over The Counter Historical Provider

## 2020-01-06 NOTE — PROGRESS NOTES
Medication History  [facility]    Patient Name: Mamadou Valadez 1945     Medication history has been completed by: Nargis Deleon CPhT    Source(s) of information: Patient and Insurance claims    Primary Care Physician: Pao Willingham MD     Pharmacy: [facility]    Allergies as of 01/06/2020 - Review Complete 01/06/2020   Allergen Reaction Noted    Prilosec [omeprazole]      Pravachol [pravastatin sodium]          Prior to Admission medications    Medication Sig Start Date End Date Taking? Authorizing Provider   Potassium 99 MG TABS Take 1 tablet by mouth daily   Yes Historical Provider, MD   lisinopril (PRINIVIL;ZESTRIL) 20 MG tablet Take 20 mg by mouth daily   Yes Historical Provider, MD   bismuth subsalicylate (PEPTO BISMOL) 262 MG/15ML suspension Take 15 mLs by mouth every 6 hours as needed for Indigestion   Yes Historical Provider, MD   Apoaequorin (PREVAGEN PO) Take 1 each by mouth daily    Yes Historical Provider, MD   aspirin 325 MG tablet Take 325 mg by mouth daily   Yes Historical Provider, MD   Multiple Vitamins-Minerals (MULTIVITAMIN PO) Take 1 each by mouth daily    Yes Historical Provider, MD   simvastatin (ZOCOR) 40 MG tablet Take 40 mg by mouth nightly. Yes Historical Provider, MD   Calcium Carbonate-Vitamin D (CALCIUM + D) 600-200 MG-UNIT TABS Take 1 tablet by mouth every morning. Over The Counter   Yes Historical Provider, MD   Ostomy Supplies (OSTOMY DRAINABLE POUCH/FLANGE) MISC 1 device replaced twice weekly prn 5/16/19   Alec Duff MD       Medications added or changed (ex.  new medication, dosage change, interval change, formulation change):  Lisinopril new medication  Potassium dose changed to 99 mg qd from 20 meq bid  Pepto bismol new prn medication    Medications requiring reconciliation with provider:   Multivitamin not ordered and patient takes  Lisinopril new medication not ordered  Potassium not ordered and patient takes  Prevagen not ordered and patient takes    Comments:  Reviewed and updated med list per patient and verified with claims    To my knowledge the above medication history is accurate as of 1/6/2020 6:17 PM.   Rosa Rojo CPhT   1/6/2020 6:17 PM

## 2020-01-06 NOTE — ED PROVIDER NOTES
As physician assistant-in-triage, I performed a medical screening history and physical exam on this patient. HISTORY OF PRESENT ILLNESS  Liborio Viveros is a 69 y.o. female with abnormal lab work. Patient states she had blood work drawn this morning and was called and told that her hemoglobin was around 5 and to come to the emergency department for further evaluation. Patient states she has felt generally fatigued. Patient denies any chest pain or shortness of breath. Patient denies blood in stool, states stool has been black however she has been taking Pepto-Bismol. PHYSICAL EXAM  BP (!) 150/79   Pulse 79   Temp 98.7 °F (37.1 °C) (Oral)   Resp 16   Ht 5' 5\" (1.651 m)   Wt 162 lb (73.5 kg)   SpO2 98%   BMI 26.96 kg/m²     On exam, the patient is an elderly female in no acute distress. Mucous membranes are moist. Speech is clear. Breathing is unlabored. Skin is dry. Mental status is normal. Moves all extremities, and is without facial droop. See future provider note for medical decision making and disposition. Comment: Please note this report has been produced using speech recognition software and may contain errors related to that system including errors in grammar, punctuation, and spelling, as well as words and phrases that may be inappropriate. If there are any questions or concerns please feel free to contact the dictating provider for clarification.         Dami Giang PA-C  01/06/20 [id number]

## 2020-01-07 VITALS
RESPIRATION RATE: 21 BRPM | TEMPERATURE: 98.3 F | HEART RATE: 63 BPM | HEIGHT: 65 IN | OXYGEN SATURATION: 100 % | WEIGHT: 167.77 LBS | DIASTOLIC BLOOD PRESSURE: 74 MMHG | SYSTOLIC BLOOD PRESSURE: 129 MMHG | BODY MASS INDEX: 27.95 KG/M2

## 2020-01-07 LAB
ANION GAP SERPL CALCULATED.3IONS-SCNC: 13 MMOL/L (ref 4–16)
BASOPHILS ABSOLUTE: 0.1 K/CU MM
BASOPHILS RELATIVE PERCENT: 0.8 % (ref 0–1)
BUN BLDV-MCNC: 17 MG/DL (ref 6–23)
CALCIUM SERPL-MCNC: 8.8 MG/DL (ref 8.3–10.6)
CHLORIDE BLD-SCNC: 109 MMOL/L (ref 99–110)
CO2: 20 MMOL/L (ref 21–32)
CREAT SERPL-MCNC: 1.3 MG/DL (ref 0.6–1.1)
DIFFERENTIAL TYPE: ABNORMAL
EOSINOPHILS ABSOLUTE: 0.3 K/CU MM
EOSINOPHILS RELATIVE PERCENT: 3.8 % (ref 0–3)
GFR AFRICAN AMERICAN: 49 ML/MIN/1.73M2
GFR NON-AFRICAN AMERICAN: 40 ML/MIN/1.73M2
GLUCOSE BLD-MCNC: 111 MG/DL (ref 70–99)
HCT VFR BLD CALC: 32.3 % (ref 37–47)
HEMOGLOBIN: 8.9 GM/DL (ref 12.5–16)
IMMATURE NEUTROPHIL %: 0.4 % (ref 0–0.43)
LYMPHOCYTES ABSOLUTE: 1.5 K/CU MM
LYMPHOCYTES RELATIVE PERCENT: 20.6 % (ref 24–44)
MCH RBC QN AUTO: 25.9 PG (ref 27–31)
MCHC RBC AUTO-ENTMCNC: 27.6 % (ref 32–36)
MCV RBC AUTO: 94.2 FL (ref 78–100)
MONOCYTES ABSOLUTE: 0.9 K/CU MM
MONOCYTES RELATIVE PERCENT: 12.5 % (ref 0–4)
NUCLEATED RBC %: 0 %
PDW BLD-RTO: 13.5 % (ref 11.7–14.9)
PLATELET # BLD: 237 K/CU MM (ref 140–440)
PMV BLD AUTO: 11.2 FL (ref 7.5–11.1)
POTASSIUM SERPL-SCNC: 4.2 MMOL/L (ref 3.5–5.1)
RBC # BLD: 3.43 M/CU MM (ref 4.2–5.4)
SEGMENTED NEUTROPHILS ABSOLUTE COUNT: 4.5 K/CU MM
SEGMENTED NEUTROPHILS RELATIVE PERCENT: 61.9 % (ref 36–66)
SODIUM BLD-SCNC: 142 MMOL/L (ref 135–145)
TOTAL IMMATURE NEUTOROPHIL: 0.03 K/CU MM
TOTAL NUCLEATED RBC: 0 K/CU MM
WBC # BLD: 7.2 K/CU MM (ref 4–10.5)

## 2020-01-07 PROCEDURE — 6360000002 HC RX W HCPCS: Performed by: NURSE PRACTITIONER

## 2020-01-07 PROCEDURE — C9113 INJ PANTOPRAZOLE SODIUM, VIA: HCPCS | Performed by: NURSE PRACTITIONER

## 2020-01-07 PROCEDURE — 2580000003 HC RX 258: Performed by: NURSE PRACTITIONER

## 2020-01-07 PROCEDURE — 6360000002 HC RX W HCPCS: Performed by: INTERNAL MEDICINE

## 2020-01-07 PROCEDURE — 80048 BASIC METABOLIC PNL TOTAL CA: CPT

## 2020-01-07 PROCEDURE — 6370000000 HC RX 637 (ALT 250 FOR IP): Performed by: NURSE PRACTITIONER

## 2020-01-07 PROCEDURE — 85025 COMPLETE CBC W/AUTO DIFF WBC: CPT

## 2020-01-07 PROCEDURE — 99221 1ST HOSP IP/OBS SF/LOW 40: CPT | Performed by: INTERNAL MEDICINE

## 2020-01-07 PROCEDURE — 36415 COLL VENOUS BLD VENIPUNCTURE: CPT

## 2020-01-07 PROCEDURE — 86900 BLOOD TYPING SEROLOGIC ABO: CPT

## 2020-01-07 PROCEDURE — 2580000003 HC RX 258: Performed by: INTERNAL MEDICINE

## 2020-01-07 RX ORDER — 0.9 % SODIUM CHLORIDE 0.9 %
250 INTRAVENOUS SOLUTION INTRAVENOUS ONCE
Status: COMPLETED | OUTPATIENT
Start: 2020-01-07 | End: 2020-01-07

## 2020-01-07 RX ORDER — PANTOPRAZOLE SODIUM 40 MG/1
40 TABLET, DELAYED RELEASE ORAL
Qty: 60 TABLET | Refills: 1 | Status: SHIPPED | OUTPATIENT
Start: 2020-01-07 | End: 2020-08-11

## 2020-01-07 RX ORDER — FAMOTIDINE 20 MG/1
20 TABLET, FILM COATED ORAL 2 TIMES DAILY
Qty: 60 TABLET | Refills: 1 | Status: CANCELLED | OUTPATIENT
Start: 2020-01-07

## 2020-01-07 RX ADMIN — SODIUM CHLORIDE: 9 INJECTION, SOLUTION INTRAVENOUS at 09:14

## 2020-01-07 RX ADMIN — PANTOPRAZOLE SODIUM 40 MG: 40 INJECTION, POWDER, FOR SOLUTION INTRAVENOUS at 09:13

## 2020-01-07 RX ADMIN — SODIUM CHLORIDE, PRESERVATIVE FREE 10 ML: 5 INJECTION INTRAVENOUS at 09:13

## 2020-01-07 RX ADMIN — SODIUM CHLORIDE 250 ML: 9 INJECTION, SOLUTION INTRAVENOUS at 02:31

## 2020-01-07 RX ADMIN — OYSTER SHELL CALCIUM WITH VITAMIN D 1 TABLET: 500; 200 TABLET, FILM COATED ORAL at 09:13

## 2020-01-07 RX ADMIN — IRON SUCROSE 200 MG: 20 INJECTION, SOLUTION INTRAVENOUS at 09:13

## 2020-01-07 ASSESSMENT — PAIN SCALES - GENERAL: PAINLEVEL_OUTOF10: 0

## 2020-01-07 NOTE — PROGRESS NOTES
Marcelo Arellano is a 69 y.o. female patient. Current Facility-Administered Medications   Medication Dose Route Frequency Provider Last Rate Last Dose    0.9 % sodium chloride bolus  250 mL Intravenous Once Jeanette Leos MD   Stopped at 01/06/20 2042    sodium chloride flush 0.9 % injection 10 mL  10 mL Intravenous 2 times per day Beauty Divers, APRN - NP        sodium chloride flush 0.9 % injection 10 mL  10 mL Intravenous PRN Beauty Divers, APRN - NP        magnesium hydroxide (MILK OF MAGNESIA) 400 MG/5ML suspension 30 mL  30 mL Oral Daily PRN Beauty Divers, APRN - NP        ondansetron [facility]) injection 4 mg  4 mg Intravenous Q6H PRN Beauty Divers, APRN - NP        pantoprazole (PROTONIX) injection 40 mg  40 mg Intravenous BID Beauty Divers, APRN - NP        acetaminophen (TYLENOL) tablet 650 mg  650 mg Oral Q4H PRN Beauty Divers, APRN - NP        0.9 % sodium chloride infusion   Intravenous Continuous Beauty Divers, APRN - NP       Bedelia Fruits Timmy Germain ON 1/7/2020] calcium carbonate-vitamin D 600-200 MG-UNIT TABS 1 tablet  1 tablet Oral QAM Beauty Divers, APRN - NP        simvastatin (ZOCOR) tablet 40 mg  40 mg Oral Nightly Beauty Divers, APRN - NP        lisinopril (PRINIVIL;ZESTRIL) tablet 20 mg  20 mg Oral Once Jeanette Leos MD         Allergies   Allergen Reactions    Prilosec [Omeprazole]      \"Got Real Lightheaded\"    Pravachol [Pravastatin Sodium]      \"I Don't Remember The Reaction\"     Active Problems:    Acute on chronic anemia  Resolved Problems:    * No resolved hospital problems. *    Blood pressure 139/61, pulse 75, temperature 98.2 °F (36.8 °C), temperature source Oral, resp. rate 23, height 5' 5\" (1.651 m), weight 156 lb 15.5 oz (71.2 kg), SpO2 100 %, not currently breastfeeding.     Subjective  Objective  Assessment & 5200 Narciso Lanza RN  1/6/2020

## 2020-01-07 NOTE — CONSULTS
resection, partial bladder repair, creation of colostomy    TUNNELED VENOUS PORT PLACEMENT  1025/2017    per old chart pt had port insertion with admission 10/2017       Current Medications:    Current Facility-Administered Medications: iron sucrose (VENOFER) 200 mg in sodium chloride 0.9 % 100 mL IVPB, 200 mg, Intravenous, Once  0.9 % sodium chloride bolus, 250 mL, Intravenous, Once  sodium chloride flush 0.9 % injection 10 mL, 10 mL, Intravenous, 2 times per day  sodium chloride flush 0.9 % injection 10 mL, 10 mL, Intravenous, PRN  magnesium hydroxide (MILK OF MAGNESIA) 400 MG/5ML suspension 30 mL, 30 mL, Oral, Daily PRN  ondansetron (ZOFRAN) injection 4 mg, 4 mg, Intravenous, Q6H PRN  pantoprazole (PROTONIX) injection 40 mg, 40 mg, Intravenous, BID  acetaminophen (TYLENOL) tablet 650 mg, 650 mg, Oral, Q4H PRN  0.9 % sodium chloride infusion, , Intravenous, Continuous  calcium-vitamin D 500-200 MG-UNIT per tablet 1 tablet, 1 tablet, Oral, QAM  simvastatin (ZOCOR) tablet 40 mg, 40 mg, Oral, Nightly  lisinopril (PRINIVIL;ZESTRIL) tablet 20 mg, 20 mg, Oral, Once    Allergies:  Prilosec [omeprazole] and Pravachol [pravastatin sodium]    Social History:    TOBACCO:   reports that she has never smoked. She has never used smokeless tobacco.  ETOH:   reports no history of alcohol use. DRUGS:   reports no history of drug use. Family History:       Problem Relation Age of Onset    Arthritis Mother     Depression Mother     Hearing Loss Mother     Cancer Father         colon cancer    Heart Disease Brother         Heart Surgery    Migraines Son      REVIEW OF SYSTEMS:    Feels OK. She has dark stool which she thought related to pepto bismol. She has intermittent abdominal discomfort. No NVD. The remainder of ROS is unremarkable.     PHYSICAL EXAM:      Vitals:    /62   Pulse 67   Temp 98.3 °F (36.8 °C) (Oral)   Resp 17   Ht 5' 5\" (1.651 m)   Wt 167 lb 12.3 oz (76.1 kg)   SpO2 95%   BMI 27.92 kg/m² CONSTITUTIONAL:  awake, alert, cooperative and no apparent distress  EYES:  pale, extra-ocular muscles intact and sclera clear  NECK:  supple, symmetrical, trachea midline and no lymphadenopathy  LUNGS:  clear to auscultation, no crackles or wheezing  CARDIOVASCULAR:  normal S1 and S2 and murmur to R parasternal border  ABDOMEN:  normal bowel sounds, soft, non-distended and non-tender  MUSCULOSKELETAL:  there is no redness, warmth, or swelling of the joints. Colostomy noted. full range of motion noted  NEUROLOGIC:  Cranial Nerves:  cranial nerves II-XII are grossly intact  SKIN:  no redness, warmth, or swelling and no jaundice  DATA:    Labs:  General Labs:    CBC with Differential:    Lab Results   Component Value Date    WBC 7.2 01/07/2020    RBC 3.43 01/07/2020    HGB 8.9 01/07/2020    HCT 32.3 01/07/2020     01/07/2020    MCV 94.2 01/07/2020    MCH 25.9 01/07/2020    MCHC 27.6 01/07/2020    RDW 13.5 01/07/2020    SEGSPCT 61.9 01/07/2020    BANDSPCT 13 10/08/2017    LYMPHOPCT 20.6 01/07/2020    MONOPCT 12.5 01/07/2020    BASOPCT 0.8 01/07/2020    MONOSABS 0.9 01/07/2020    LYMPHSABS 1.5 01/07/2020    EOSABS 0.3 01/07/2020    BASOSABS 0.1 01/07/2020    DIFFTYPE AUTOMATED DIFFERENTIAL 01/07/2020     Platelets:    Lab Results   Component Value Date     01/07/2020     Results for Stanislav Tobar (MRN [id number]) as of 1/7/2020 07:56   Ref. Range 1/6/2020 18:41   Ferritin Latest Ref Range: 15 - 150 NG/ML 8 (L)   Iron Latest Ref Range: 37 - 145 ug/dL 10 (L)   UIBC Latest Ref Range: 110 - 370 ug/dL 411 (H)   TIBC Latest Ref Range: 250 - 450 ug/dL 421   Transferrin % Latest Ref Range: 10 - 44 % 2 (L)   Folate Latest Ref Range: 3.1 - 17.5 NG/ML >20.0 (H)   Vitamin B-12 Latest Ref Range: 211 - 911 pg/ml 287.9     IMPRESSION/RECOMMENDATIONS:      1. She has metastatic colon cancer on palliative chemo. Will hold chemo this week as per her request.    2. TEQUILA. ASA was held. GI will be consulted.   She will

## 2020-01-07 NOTE — DISCHARGE SUMMARY
appropriate.     BMP/CBC  Recent Labs     01/06/20  1000 01/06/20  1423 01/06/20  1841 01/07/20  0713    137  --  142   K 4.4 4.2  --  4.2    104  --  109   CO2 28 24  --  20*   BUN 19 17  --  17   CREATININE 1.3* 1.2*  --  1.3*   WBC 7.3 7.6 7.0 7.2   HCT 20.2* 18.8  CALLED ED JUSTIN SOUSA [id number] ANNIKA T  RESULTS READ BACK  * 18.6* 32.3*    243 246 237       IMAGING:  All imaging reviewed before discharge    Discharge Time of 33 minutes    Electronically signed by Florina Bryan MD on 1/7/2020 at 5:09 PM

## 2020-01-08 NOTE — CONSULTS
[address] [place] [address], [address] [place] [address]                                  CONSULTATION    PATIENT NAME: Avila Anderson                    :        1945  MED REC NO:   [id number]                          ROOM:         ACCOUNT NO:   [de-identified]                           ADMIT DATE: 2020  PROVIDER:     Dorota Oropeza MD    CONSULT DATE:  2020    PRIMARY CARE PROVIDER:  Soyla Fothergill, MD    CHIEF COMPLAINT:  History of anemia; rule out GI bleeding. HISTORY OF PRESENT ILLNESS:  The patient is a 78-year-old female, a  patient of Dr. Naseem Duron, also known to Dr. Daxa Rankin with past medical  history significant for metastatic carcinoma of the colon, status post  sigmoid colon resection with left-sided colostomy on 10/07/2017. The  patient has been on chemotherapy since and had a needle biopsy of the  lung done on 2019 and was diagnosed to have metastatic carcinoma  of the lung as well. The patient had a routine blood workup done, in  fact yesterday her hemoglobin was 5.1, she was requested to come to the  hospital for blood transfusion. The patient was transfused with 2 units  of packed RBCs. Repeat hemoglobin is 8.9 gm%. The patient's hemoglobin  on 2019 was 8.9 gm% as well. As above mentioned, the patient has  been seen by Dr. Daxa Rankin in the past who performed colonoscopy for  bleeding per colostomy bag on 2019 and the exam was unremarkable. The patient has not had an EGD done in the past.    The patient denies abdominal pain, nausea, vomiting, hematemesis,  melena, or hematochezia. The patient's stools however are slightly dark  today per colostomy bag. The patient is on Protonix and is  hemodynamically stable and the patient wants to be discharged today. REVIEW OF SYSTEMS:  CENTRAL NERVOUS SYSTEM:  The patient denies headache or focal  sensorimotor symptoms.   CARDIOVASCULAR:  No history of the patient to be awake, alert, and oriented. There are no  focal sensorimotor signs. MUSCULOSKELETAL:  Shows evidence of degenerative joint disease changes. LABORATORY DATA:  Labs drawn today comprised a chem profile, which is  remarkable for a creatinine of 1.3. LFTs show alkaline phosphatase 256. CBC showed a hemoglobin of 5.1 yesterday; today after 2 units of packed  RBCs is 8.9. INR on 11/10/2019 was 1.04. IMPRESSION:  A 78-year-old white female with multiple comorbidities  including metastatic carcinoma of the colon, on chemotherapy admitted  with severe anemia with no gross bleeding; however, an occult GI  bleeding lesion cannot be ruled out. RECOMMENDATIONS:  1. Agree with present management with Protonix. 2.  Monitor the patient's serial H and H and transfuse on a p.r.n. basis  to keep hemoglobin above 8 gm%. 3.  No need for repeat colonoscopy since the patient had one on  11/13/2019 by Dr. Allyson Coyle. 4.  We would like to do an EGD to rule out an upper GI bleeding lesion. The patient wishes to be discharged today and would like to have the  procedure done as an outpatient (the patient to call the office for  appointment). 5.  A small bowel evaluation later if needed as a part of workup of the  patient's anemia. 6.  The case and plan has been discussed in detail with the patient and  her .         Gildardo Camara MD    D: 01/07/2020 16:44:34       T: 01/07/2020 18:12:46     [email or web address]  Job#: [id number]     Doc#: [id number]    CC:  MD Paula Molina MD

## 2020-01-10 LAB
ABO/RH: NORMAL
ANTIBODY DATA: NORMAL
ANTIBODY SCREEN: POSITIVE
COMMENT: NORMAL
COMPONENT: NORMAL
CROSSMATCH RESULT: NORMAL
STATUS: NORMAL
TRANSFUSION STATUS: NORMAL
UNIT DIVISION: 0
UNIT NUMBER: NORMAL

## 2020-01-10 NOTE — ED PROVIDER NOTES
Emergency Department Encounter    Patient: Maggie Guevara  MRN: [id number]  : 1945  Date of Evaluation: 1/10/2020  ED Provider:  Kira Arcos    Triage Chief Complaint:   Other (abnormal labs)    [place]:  Maggie Guevara is a 69 y.o. female that presents patient presents for abnormal outpatient labs she states she had blood work drawn this morning and her hemoglobin was low so she was sent to the ER for further evaluation. She states she is fatigued but otherwise has no complaints. She denies blood in her stool she states her stool has been black but she is been taking Pepto-Bismol. She is on chemotherapy, blood work was checked routinely prior to her next round of treatment.     ROS - see HPI, below listed is current ROS at time of my eval:  General:  No fevers, no chills, + she seen a new patient in 20 weakness  Eyes:  No recent vison changes, no discharge  ENT:  No sore throat, no nasal congestion, no hearing changes  Cardiovascular:  No chest pain, no palpitations  Respiratory:  No shortness of breath, no cough, no wheezing  Gastrointestinal:  No pain, no nausea, no vomiting, no diarrhea  Musculoskeletal:  No muscle pain, no joint pain  Skin:  No rash, no pruritis, no easy bruising  Neurologic:  No speech problems, no headache  Genitourinary:  No dysuria, no hematuria  Endocrine:  No unexpected weight gain, no unexpected weight loss  Extremities:  no edema, no pain    Past Medical History:   Diagnosis Date    Abnormal CT scan     \"4/10/2019\"found spot on the lung and liver\"     Abnormal finding on EKG     Acid reflux     Aortic stenosis     Aortic valve prosthesis present 2014 Dr Kitty Hook #23 [employer] tissue valve     AR (aortic regurgitation)     Arthritis     \"Hands Mostly\"    CAD (coronary artery disease)     S/P CABG x1 2014-follows with Dr Jazmin Osborne [facility])     colon\"dx in 10/2017- tx with surg and chemo- following with Dr Clay treviño [facility])     COPD, mild ([address] 9/6/2018    Diabetes mellitus [address] [address]     \"at one time was borderline- but never on medication- and no one said anything about me having this at present\"    Family history of coronary artery disease     Fatigue     H/O 24 hour EKG monitoring 05/03/2018    48-hour Holter monitor showed normal sinus rhythm average rate is 72 bpm lowest rate of 52 occurred at 2:20 AM fastest rate of 104 occurred at 6 PM.  No significant ventricular or supraventricular ectopy is noted. Patient submitted a diary did not report any activities or symptoms during the monitoring    H/O cardiovascular stress test 2/7/12 2/12- Normal study-EF 70%    H/O echocardiogram 5/29/19; 05/22/2018    LV function and size normal w/mild asymmetric LV hypertrophy of septal wall, no regional wall motion abnormalities, diastolic dysfunction Grade I, all chamber dimensions WNL, possible stenosis of the bioprosthetic aortic valve w/mean pressure of 23 mmHg, mild TR, RVSP= 26 mmHg, EF 55-60%. Compared to previous echo, prosthetic AOV mean pressure shows slight decrease from 25 to 23 mmHg.     Heart murmur     [place] (hard of hearing)     Bilateral Ears    Hyperlipidemia     Hypertension     \"use to be on b/p medication and took it untill 10/2017 and they never put me back on it\"    MR (congenital mitral regurgitation)     Multiple lung nodules on CT 3/7/2019    Nocturnal hypoxemia 11/3/2017    Pleural effusion     per old chart pt dx with large pleural effusion and had thoracentesis and chest tube placement done 10/22/2017 and then on 10/26/2017 had thoracentesis and removal of chest tube - also had pt had intrapleural installation of tPA 10/24/2017    Pneumonia Dx 1990's    Prolonged emergence from general anesthesia     \"alittle trouble waking me up\"    S/P CABG x 1 8/12/2014 7/30/14 [facility]- LAD Dr Joseph Licea Wears glasses      Past Surgical History:   Procedure Laterality Date    ABDOMEN SURGERY Relationships    Social connections:     Talks on phone: Not on file     Gets together: Not on file     Attends [employer] service: Not on file     Active member of club or organization: Not on file     Attends meetings of clubs or organizations: Not on file     Relationship status: Not on file    Intimate partner violence:     Fear of current or ex partner: Not on file     Emotionally abused: Not on file     Physically abused: Not on file     Forced sexual activity: Not on file   Other Topics Concern    Not on file   Social History Narrative            Never use alcohol        Never use tobacco        Never use drugs        Caffeine 2 cups of coffee per day 1 soda every day Iced tea        Retired [employer] worker             No current facility-administered medications for this encounter. Current Outpatient Medications   Medication Sig Dispense Refill    aspirin 81 MG tablet Take 4 tablets by mouth daily 90 tablet 1    pantoprazole (PROTONIX) 40 MG tablet Take 1 tablet by mouth 2 times daily (before meals) 60 tablet 1    Potassium 99 MG TABS Take 1 tablet by mouth daily      lisinopril (PRINIVIL;ZESTRIL) 20 MG tablet Take 20 mg by mouth daily      bismuth subsalicylate (PEPTO BISMOL) 262 MG/15ML suspension Take 15 mLs by mouth every 6 hours as needed for Indigestion      Apoaequorin (PREVAGEN PO) Take 1 each by mouth daily       Multiple Vitamins-Minerals (MULTIVITAMIN PO) Take 1 each by mouth daily       simvastatin (ZOCOR) 40 MG tablet Take 40 mg by mouth nightly.  Calcium Carbonate-Vitamin D (CALCIUM + D) 600-200 MG-UNIT TABS Take 1 tablet by mouth every morning.  Over The Counter      Ostomy Supplies (OSTOMY DRAINABLE POUCH/FLANGE) MISC 1 device replaced twice weekly prn 10 each 11     Allergies   Allergen Reactions    Prilosec [Omeprazole]      \"Got Real Lightheaded\"    Pravachol [Pravastatin Sodium]      \"I Don't Remember The Reaction\"       Nursing Notes Absolute 0.3 K/CU MM    Basophils Absolute 0.0 K/CU MM    Nucleated RBC % 0.0 %    Total Nucleated RBC 0.0 K/CU MM    Total Immature Neutrophil 0.02 K/CU MM    Immature Neutrophil % 0.3 0 - 0.43 %   Comprehensive Metabolic Panel   Result Value Ref Range    Sodium 137 135 - 145 MMOL/L    Potassium 4.2 3.5 - 5.1 MMOL/L    Chloride 104 99 - 110 mMol/L    CO2 24 21 - 32 MMOL/L    BUN 17 6 - 23 MG/DL    CREATININE 1.2 (H) 0.6 - 1.1 MG/DL    Glucose 110 (H) 70 - 99 MG/DL    Calcium 9.0 8.3 - 10.6 MG/DL    Alb 3.5 3.4 - 5.0 GM/DL    Total Protein 7.0 6.4 - 8.2 GM/DL    Total Bilirubin 0.2 0.0 - 1.0 MG/DL    ALT 16 10 - 40 U/L    AST 27 15 - 37 IU/L    Alkaline Phosphatase 256 (H) 40 - 129 IU/L    GFR Non- 44 (L) >60 mL/min/1.73m2    GFR  53 (L) >60 mL/min/1.73m2    Anion Gap 9 4 - 16   Magnesium   Result Value Ref Range    Magnesium 2.4 1.8 - 2.4 mg/dl   ANEMIA PANEL   Result Value Ref Range    WBC 7.0 4.0 - 10.5 K/CU MM    RBC 2.11 (L) 4.2 - 5.4 M/CU MM    Hemoglobin (LL) 12.5 - 16.0 GM/DL     5.3  CALLED TO  PHYS SHON [id number]/MLT K [place]  RESULTS READ BACK  RESULT CHECKED      Hematocrit 18.6 (LL) 37 - 47 %    MCV 88.2 78 - 100 FL    MCH 25.1 (L) 27 - 31 PG    MCHC 28.5 (L) 32.0 - 36.0 %    RDW 13.5 11.7 - 14.9 %    Platelets 978 226 - 296 K/CU MM    MPV 10.7 7.5 - 11.1 FL    Differential Type AUTOMATED DIFFERENTIAL     Segs Relative 60.0 36 - 66 %    Lymphocytes % 22.8 (L) 24 - 44 %    Monocytes % 12.5 (H) 0 - 4 %    Eosinophils % 4.0 (H) 0 - 3 %    Basophils % 0.4 0 - 1 %    Segs Absolute 4.2 K/CU MM    Lymphocytes Absolute 1.6 K/CU MM    Monocytes Absolute 0.9 K/CU MM    Eosinophils Absolute 0.3 K/CU MM    Basophils Absolute 0.0 K/CU MM    Nucleated RBC % 0.0 %    Total Nucleated RBC 0.0 K/CU MM    Total Immature Neutrophil 0.02 K/CU MM    Immature Neutrophil % 0.3 0 - 0.43 %    Vitamin B-12 287.9 211 - 911 pg/ml    Folate >20.0 (H) 3.1 - 17.5 NG/ML    Iron 10 (L) 37 - 145 ug/dL    UIBC 411 (H) 110 - 370 ug/dL    TIBC 421 250 - 450 ug/dL    Transferrin % 2 (L) 10 - 44 %    Retic Ct Pct 3.0 (H) 0.2 - 2.20 %    Ferritin 8 (L) 15 - 150 NG/ML   Basic Metabolic Panel w/ Reflex to MG   Result Value Ref Range    Sodium 142 135 - 145 MMOL/L    Potassium 4.2 3.5 - 5.1 MMOL/L    Chloride 109 99 - 110 mMol/L    CO2 20 (L) 21 - 32 MMOL/L    Anion Gap 13 4 - 16    BUN 17 6 - 23 MG/DL    CREATININE 1.3 (H) 0.6 - 1.1 MG/DL    Glucose 111 (H) 70 - 99 MG/DL    Calcium 8.8 8.3 - 10.6 MG/DL    GFR Non-African American 40 (L) >60 mL/min/1.73m2    GFR  49 (L) >60 mL/min/1.73m2   CBC auto differential   Result Value Ref Range    WBC 7.2 4.0 - 10.5 K/CU MM    RBC 3.43 (L) 4.2 - 5.4 M/CU MM    Hemoglobin 8.9 (L) 12.5 - 16.0 GM/DL    Hematocrit 32.3 (L) 37 - 47 %    MCV 94.2 78 - 100 FL    MCH 25.9 (L) 27 - 31 PG    MCHC 27.6 (L) 32.0 - 36.0 %    RDW 13.5 11.7 - 14.9 %    Platelets 332 602 - 497 K/CU MM    MPV 11.2 (H) 7.5 - 11.1 FL    Differential Type AUTOMATED DIFFERENTIAL     Segs Relative 61.9 36 - 66 %    Lymphocytes % 20.6 (L) 24 - 44 %    Monocytes % 12.5 (H) 0 - 4 %    Eosinophils % 3.8 (H) 0 - 3 %    Basophils % 0.8 0 - 1 %    Segs Absolute 4.5 K/CU MM    Lymphocytes Absolute 1.5 K/CU MM    Monocytes Absolute 0.9 K/CU MM    Eosinophils Absolute 0.3 K/CU MM    Basophils Absolute 0.1 K/CU MM    Nucleated RBC % 0.0 %    Total Nucleated RBC 0.0 K/CU MM    Total Immature Neutrophil 0.03 K/CU MM    Immature Neutrophil % 0.4 0 - 0.43 %   TYPE AND SCREEN   Result Value Ref Range    ABO/Rh A POSITIVE     Antibody Screen POSITIVE     Antibody Data  ANTI-E PRESENT     Comment 30 MIN UNENHANCED XM YIELDS COMPATIBILITY     Unit Number [id number]     Component LEUKO-POOR RED CELLS     Unit Divison 00     Status REL FROM [facility]     Transfusion Status OK TO TRANSFUSE     Crossmatch Result COMPATIBLE     Unit Number [id number]     Component LEUKO-POOR RED CELLS     Unit Divison 00     Status

## 2020-01-17 ENCOUNTER — HOSPITAL ENCOUNTER (OUTPATIENT)
Dept: INFUSION THERAPY | Age: 75
Discharge: HOME | End: 2020-01-17
Payer: MEDICARE

## 2020-01-17 LAB
ALBUMIN SERPL-MCNC: 3.8 GM/DL (ref 3.4–5)
ALP BLD-CCNC: 360 IU/L (ref 40–128)
ALT SERPL-CCNC: 21 U/L (ref 10–40)
ANION GAP SERPL CALCULATED.3IONS-SCNC: 11 MMOL/L (ref 4–16)
AST SERPL-CCNC: 39 IU/L (ref 15–37)
BILIRUB SERPL-MCNC: 0.3 MG/DL (ref 0–1)
BUN BLDV-MCNC: 12 MG/DL (ref 6–23)
CALCIUM SERPL-MCNC: 9.2 MG/DL (ref 8.3–10.6)
CHLORIDE BLD-SCNC: 104 MMOL/L (ref 99–110)
CO2: 26 MMOL/L (ref 21–32)
CREAT SERPL-MCNC: 1.2 MG/DL (ref 0.6–1.1)
DIFFERENTIAL TYPE: ABNORMAL
EOSINOPHILS ABSOLUTE: 0.3 K/CU MM
EOSINOPHILS RELATIVE PERCENT: 5 % (ref 0–3)
GFR AFRICAN AMERICAN: 53 ML/MIN/1.73M2
GFR NON-AFRICAN AMERICAN: 44 ML/MIN/1.73M2
GLUCOSE BLD-MCNC: 104 MG/DL (ref 70–99)
HCT VFR BLD CALC: 29.7 % (ref 37–47)
HEMOGLOBIN: 8.9 GM/DL (ref 12.5–16)
LYMPHOCYTES ABSOLUTE: 1.1 K/CU MM
LYMPHOCYTES RELATIVE PERCENT: 17 % (ref 24–44)
MCH RBC QN AUTO: 26.2 PG (ref 27–31)
MCHC RBC AUTO-ENTMCNC: 30 % (ref 32–36)
MCV RBC AUTO: 87.4 FL (ref 78–100)
MONOCYTES ABSOLUTE: 0.9 K/CU MM
MONOCYTES RELATIVE PERCENT: 13 % (ref 0–4)
PDW BLD-RTO: 15.5 % (ref 11.7–14.9)
PLATELET # BLD: 211 K/CU MM (ref 140–440)
PMV BLD AUTO: 11.1 FL (ref 7.5–11.1)
POTASSIUM SERPL-SCNC: 4.3 MMOL/L (ref 3.5–5.1)
RBC # BLD: 3.4 M/CU MM (ref 4.2–5.4)
SEGMENTED NEUTROPHILS ABSOLUTE COUNT: 4.4 K/CU MM
SEGMENTED NEUTROPHILS RELATIVE PERCENT: 65 % (ref 36–66)
SODIUM BLD-SCNC: 141 MMOL/L (ref 135–145)
TOTAL PROTEIN: 7.1 GM/DL (ref 6.4–8.2)
WBC # BLD: 6.7 K/CU MM (ref 4–10.5)

## 2020-01-17 PROCEDURE — 85025 COMPLETE CBC W/AUTO DIFF WBC: CPT

## 2020-01-17 PROCEDURE — 36415 COLL VENOUS BLD VENIPUNCTURE: CPT

## 2020-01-17 PROCEDURE — 80053 COMPREHEN METABOLIC PANEL: CPT

## 2020-01-21 ENCOUNTER — HOSPITAL ENCOUNTER (OUTPATIENT)
Dept: INFUSION THERAPY | Age: 75
Discharge: HOME | End: 2020-01-21
Payer: MEDICARE

## 2020-01-21 PROCEDURE — 96368 THER/DIAG CONCURRENT INF: CPT

## 2020-01-21 PROCEDURE — 2580000003 HC RX 258: Performed by: INTERNAL MEDICINE

## 2020-01-21 PROCEDURE — 96417 CHEMO IV INFUS EACH ADDL SEQ: CPT

## 2020-01-21 PROCEDURE — 6360000002 HC RX W HCPCS

## 2020-01-21 PROCEDURE — 96375 TX/PRO/DX INJ NEW DRUG ADDON: CPT

## 2020-01-21 PROCEDURE — 99214 OFFICE O/P EST MOD 30 MIN: CPT

## 2020-01-21 PROCEDURE — 96413 CHEMO IV INFUSION 1 HR: CPT

## 2020-01-21 PROCEDURE — 6360000002 HC RX W HCPCS: Performed by: INTERNAL MEDICINE

## 2020-01-21 PROCEDURE — 96415 CHEMO IV INFUSION ADDL HR: CPT

## 2020-01-21 RX ORDER — PALONOSETRON 0.05 MG/ML
INJECTION, SOLUTION INTRAVENOUS
Status: DISPENSED
Start: 2020-01-21 | End: 2020-01-21

## 2020-01-21 RX ORDER — DEXAMETHASONE SODIUM PHOSPHATE 10 MG/ML
INJECTION, SOLUTION INTRAMUSCULAR; INTRAVENOUS
Status: DISPENSED
Start: 2020-01-21 | End: 2020-01-21

## 2020-01-21 RX ORDER — DEXAMETHASONE SODIUM PHOSPHATE 10 MG/ML
10 INJECTION, SOLUTION INTRAMUSCULAR; INTRAVENOUS ONCE
Status: DISCONTINUED | OUTPATIENT
Start: 2020-01-21 | End: 2020-01-22 | Stop reason: HOSPADM

## 2020-01-21 RX ORDER — PALONOSETRON 0.05 MG/ML
0.25 INJECTION, SOLUTION INTRAVENOUS ONCE
Status: DISCONTINUED | OUTPATIENT
Start: 2020-01-21 | End: 2020-01-22 | Stop reason: HOSPADM

## 2020-01-23 ENCOUNTER — HOSPITAL ENCOUNTER (OUTPATIENT)
Dept: INFUSION THERAPY | Age: 75
Discharge: HOME | End: 2020-01-23
Payer: MEDICARE

## 2020-01-23 PROCEDURE — 96523 IRRIG DRUG DELIVERY DEVICE: CPT

## 2020-02-03 ENCOUNTER — HOSPITAL ENCOUNTER (OUTPATIENT)
Dept: INFUSION THERAPY | Age: 75
Discharge: HOME | End: 2020-02-03
Payer: MEDICARE

## 2020-02-03 LAB
ALBUMIN SERPL-MCNC: 3.5 GM/DL (ref 3.4–5)
ALP BLD-CCNC: 238 IU/L (ref 40–128)
ALT SERPL-CCNC: 20 U/L (ref 10–40)
ANION GAP SERPL CALCULATED.3IONS-SCNC: 12 MMOL/L (ref 4–16)
AST SERPL-CCNC: 27 IU/L (ref 15–37)
BASOPHILS ABSOLUTE: 0 K/CU MM
BASOPHILS RELATIVE PERCENT: 1 % (ref 0–1)
BILIRUB SERPL-MCNC: 0.3 MG/DL (ref 0–1)
BUN BLDV-MCNC: 16 MG/DL (ref 6–23)
CALCIUM SERPL-MCNC: 9.1 MG/DL (ref 8.3–10.6)
CEA: 20.9 NG/ML
CHLORIDE BLD-SCNC: 108 MMOL/L (ref 99–110)
CO2: 25 MMOL/L (ref 21–32)
CREAT SERPL-MCNC: 1.2 MG/DL (ref 0.6–1.1)
DIFFERENTIAL TYPE: ABNORMAL
EOSINOPHILS ABSOLUTE: 0.1 K/CU MM
EOSINOPHILS RELATIVE PERCENT: 2 % (ref 0–3)
GFR AFRICAN AMERICAN: 50 ML/MIN/1.73M2
GFR AFRICAN AMERICAN: 53 ML/MIN/1.73M2
GFR NON-AFRICAN AMERICAN: 41 ML/MIN/1.73M2
GFR NON-AFRICAN AMERICAN: 44 ML/MIN/1.73M2
GLUCOSE BLD-MCNC: 119 MG/DL (ref 70–99)
GLUCOSE BLD-MCNC: 123 MG/DL (ref 70–99)
HCT VFR BLD CALC: 21.2 % (ref 37–47)
HEMOGLOBIN: 6.2 GM/DL (ref 12.5–16)
LYMPHOCYTES ABSOLUTE: 1 K/CU MM
LYMPHOCYTES RELATIVE PERCENT: 23 % (ref 24–44)
MCH RBC QN AUTO: 26.1 PG (ref 27–31)
MCHC RBC AUTO-ENTMCNC: 29.2 % (ref 32–36)
MCV RBC AUTO: 89.1 FL (ref 78–100)
MONOCYTES ABSOLUTE: 0.7 K/CU MM
MONOCYTES RELATIVE PERCENT: 15 % (ref 0–4)
PDW BLD-RTO: 16.5 % (ref 11.7–14.9)
PLATELET # BLD: 212 K/CU MM (ref 140–440)
PMV BLD AUTO: 10.1 FL (ref 7.5–11.1)
POC CALCIUM: 1.19 MMOL/L (ref 1.12–1.32)
POC CHLORIDE: 110 MMOL/L (ref 98–109)
POC CREATININE: 1.3 MG/DL (ref 0.6–1.1)
POTASSIUM SERPL-SCNC: 3.9 MMOL/L (ref 3.6–5.1)
POTASSIUM SERPL-SCNC: 4.2 MMOL/L (ref 3.5–5.1)
RBC # BLD: 2.38 M/CU MM (ref 4.2–5.4)
SEGMENTED NEUTROPHILS ABSOLUTE COUNT: 2.6 K/CU MM
SEGMENTED NEUTROPHILS RELATIVE PERCENT: 59 % (ref 36–66)
SODIUM BLD-SCNC: 145 MMOL/L (ref 135–145)
SODIUM BLD-SCNC: 146 MMOL/L (ref 136–145)
TOTAL PROTEIN: 6.2 GM/DL (ref 6.4–8.2)
WBC # BLD: 4.4 K/CU MM (ref 4–10.5)

## 2020-02-03 PROCEDURE — 36415 COLL VENOUS BLD VENIPUNCTURE: CPT

## 2020-02-03 PROCEDURE — 80053 COMPREHEN METABOLIC PANEL: CPT

## 2020-02-03 PROCEDURE — 83550 IRON BINDING TEST: CPT

## 2020-02-03 PROCEDURE — 82746 ASSAY OF FOLIC ACID SERUM: CPT

## 2020-02-03 PROCEDURE — 85025 COMPLETE CBC W/AUTO DIFF WBC: CPT

## 2020-02-03 PROCEDURE — 82607 VITAMIN B-12: CPT

## 2020-02-03 PROCEDURE — 82728 ASSAY OF FERRITIN: CPT

## 2020-02-03 PROCEDURE — 83540 ASSAY OF IRON: CPT

## 2020-02-03 PROCEDURE — 82378 CARCINOEMBRYONIC ANTIGEN: CPT

## 2020-02-04 ENCOUNTER — HOSPITAL ENCOUNTER (OUTPATIENT)
Dept: INFUSION THERAPY | Age: 75
Discharge: HOME | End: 2020-02-04
Payer: MEDICARE

## 2020-02-04 ENCOUNTER — HOSPITAL ENCOUNTER (OUTPATIENT)
Dept: INFUSION THERAPY | Age: 75
Setting detail: INFUSION SERIES
Discharge: HOME | End: 2020-02-04
Payer: MEDICARE

## 2020-02-04 VITALS
SYSTOLIC BLOOD PRESSURE: 150 MMHG | HEIGHT: 65 IN | HEART RATE: 72 BPM | OXYGEN SATURATION: 100 % | BODY MASS INDEX: 27.82 KG/M2 | DIASTOLIC BLOOD PRESSURE: 65 MMHG | WEIGHT: 167 LBS | TEMPERATURE: 98.7 F | RESPIRATION RATE: 16 BRPM

## 2020-02-04 LAB
BASOPHILS ABSOLUTE: 0 K/CU MM
BASOPHILS RELATIVE PERCENT: 1 % (ref 0–1)
DIFFERENTIAL TYPE: ABNORMAL
EOSINOPHILS ABSOLUTE: 0.1 K/CU MM
EOSINOPHILS RELATIVE PERCENT: 3 % (ref 0–3)
FERRITIN: 12 NG/ML (ref 15–150)
FOLATE: >20 NG/ML (ref 3.1–17.5)
HCT VFR BLD CALC: 20.8 % (ref 37–47)
HEMOGLOBIN: 6.2 GM/DL (ref 12.5–16)
IRON: 13 UG/DL (ref 37–145)
LYMPHOCYTES ABSOLUTE: 0.9 K/CU MM
LYMPHOCYTES RELATIVE PERCENT: 24 % (ref 24–44)
MCH RBC QN AUTO: 26.2 PG (ref 27–31)
MCHC RBC AUTO-ENTMCNC: 29.8 % (ref 32–36)
MCV RBC AUTO: 87.8 FL (ref 78–100)
MONOCYTES ABSOLUTE: 0.7 K/CU MM
MONOCYTES RELATIVE PERCENT: 19 % (ref 0–4)
PCT TRANSFERRIN: 3 % (ref 10–44)
PDW BLD-RTO: 15.9 % (ref 11.7–14.9)
PLATELET # BLD: 197 K/CU MM (ref 140–440)
PMV BLD AUTO: 10 FL (ref 7.5–11.1)
RBC # BLD: 2.37 M/CU MM (ref 4.2–5.4)
SEGMENTED NEUTROPHILS ABSOLUTE COUNT: 2.2 K/CU MM
SEGMENTED NEUTROPHILS RELATIVE PERCENT: 53 % (ref 36–66)
TOTAL IRON BINDING CAPACITY: 424 UG/DL (ref 250–450)
UNSATURATED IRON BINDING CAPACITY: 411 UG/DL (ref 110–370)
VITAMIN B-12: 306.6 PG/ML (ref 211–911)
WBC # BLD: 3.9 K/CU MM (ref 4–10.5)

## 2020-02-04 PROCEDURE — 86880 COOMBS TEST DIRECT: CPT

## 2020-02-04 PROCEDURE — 99211 OFF/OP EST MAY X REQ PHY/QHP: CPT

## 2020-02-04 PROCEDURE — P9016 RBC LEUKOCYTES REDUCED: HCPCS

## 2020-02-04 PROCEDURE — 36430 TRANSFUSION BLD/BLD COMPNT: CPT

## 2020-02-04 PROCEDURE — 86900 BLOOD TYPING SEROLOGIC ABO: CPT

## 2020-02-04 PROCEDURE — 2580000003 HC RX 258: Performed by: INTERNAL MEDICINE

## 2020-02-04 PROCEDURE — 96374 THER/PROPH/DIAG INJ IV PUSH: CPT

## 2020-02-04 PROCEDURE — 6360000002 HC RX W HCPCS: Performed by: INTERNAL MEDICINE

## 2020-02-04 PROCEDURE — 36415 COLL VENOUS BLD VENIPUNCTURE: CPT

## 2020-02-04 PROCEDURE — 86850 RBC ANTIBODY SCREEN: CPT

## 2020-02-04 PROCEDURE — 86870 RBC ANTIBODY IDENTIFICATION: CPT

## 2020-02-04 PROCEDURE — 6370000000 HC RX 637 (ALT 250 FOR IP): Performed by: INTERNAL MEDICINE

## 2020-02-04 PROCEDURE — 86901 BLOOD TYPING SEROLOGIC RH(D): CPT

## 2020-02-04 PROCEDURE — 86922 COMPATIBILITY TEST ANTIGLOB: CPT

## 2020-02-04 PROCEDURE — 86905 BLOOD TYPING RBC ANTIGENS: CPT

## 2020-02-04 PROCEDURE — 86906 BLD TYPING SEROLOGIC RH PHNT: CPT

## 2020-02-04 PROCEDURE — 85025 COMPLETE CBC W/AUTO DIFF WBC: CPT

## 2020-02-04 RX ORDER — SODIUM CHLORIDE 0.9 % (FLUSH) 0.9 %
10 SYRINGE (ML) INJECTION PRN
Status: DISCONTINUED | OUTPATIENT
Start: 2020-02-04 | End: 2020-02-05 | Stop reason: HOSPADM

## 2020-02-04 RX ORDER — DIPHENHYDRAMINE HCL 25 MG
25 TABLET ORAL SEE ADMIN INSTRUCTIONS
Status: COMPLETED | OUTPATIENT
Start: 2020-02-04 | End: 2020-02-04

## 2020-02-04 RX ORDER — FUROSEMIDE 10 MG/ML
20 INJECTION INTRAMUSCULAR; INTRAVENOUS SEE ADMIN INSTRUCTIONS
Status: DISCONTINUED | OUTPATIENT
Start: 2020-02-04 | End: 2020-02-05 | Stop reason: HOSPADM

## 2020-02-04 RX ORDER — METHYLPREDNISOLONE SODIUM SUCCINATE 40 MG/ML
20 INJECTION, POWDER, LYOPHILIZED, FOR SOLUTION INTRAMUSCULAR; INTRAVENOUS ONCE
Status: COMPLETED | OUTPATIENT
Start: 2020-02-04 | End: 2020-02-04

## 2020-02-04 RX ORDER — ACETAMINOPHEN 325 MG/1
650 TABLET ORAL SEE ADMIN INSTRUCTIONS
Status: COMPLETED | OUTPATIENT
Start: 2020-02-04 | End: 2020-02-04

## 2020-02-04 RX ORDER — HEPARIN SODIUM (PORCINE) LOCK FLUSH IV SOLN 100 UNIT/ML 100 UNIT/ML
500 SOLUTION INTRAVENOUS PRN
Status: DISCONTINUED | OUTPATIENT
Start: 2020-02-04 | End: 2020-02-05 | Stop reason: HOSPADM

## 2020-02-04 RX ORDER — 0.9 % SODIUM CHLORIDE 0.9 %
250 INTRAVENOUS SOLUTION INTRAVENOUS ONCE
Status: COMPLETED | OUTPATIENT
Start: 2020-02-04 | End: 2020-02-04

## 2020-02-04 RX ADMIN — DIPHENHYDRAMINE HYDROCHLORIDE 25 MG: 25 TABLET ORAL at 14:20

## 2020-02-04 RX ADMIN — METHYLPREDNISOLONE SODIUM SUCCINATE 20 MG: 40 INJECTION, POWDER, FOR SOLUTION INTRAMUSCULAR; INTRAVENOUS at 14:20

## 2020-02-04 RX ADMIN — Medication 10 ML: at 17:00

## 2020-02-04 RX ADMIN — SODIUM CHLORIDE 250 ML: 9 INJECTION, SOLUTION INTRAVENOUS at 14:20

## 2020-02-04 RX ADMIN — ACETAMINOPHEN 650 MG: 325 TABLET ORAL at 14:20

## 2020-02-04 ASSESSMENT — PAIN SCALES - GENERAL
PAINLEVEL_OUTOF10: 0
PAINLEVEL_OUTOF10: 0

## 2020-02-04 NOTE — PLAN OF CARE
Ambulatory to unit room 4 for Blood Transfusion. Orientated to unit. Procedure and plan of care explained. Questions answered. Understanding verbalized.

## 2020-02-05 ENCOUNTER — HOSPITAL ENCOUNTER (OUTPATIENT)
Dept: INFUSION THERAPY | Age: 75
Discharge: HOME | End: 2020-02-05
Payer: MEDICARE

## 2020-02-05 LAB
ABO/RH: NORMAL
ANTIBODY DATA: NORMAL
ANTIBODY SCREEN: NEGATIVE
COMMENT: NORMAL
COMPONENT: NORMAL
CROSSMATCH RESULT: NORMAL
DAT IGG: POSITIVE
DIRECT COOMBS: POSITIVE
STATUS: NORMAL
TRANSFUSION STATUS: NORMAL
UNIT DIVISION: 0
UNIT NUMBER: NORMAL

## 2020-02-05 PROCEDURE — 6360000002 HC RX W HCPCS

## 2020-02-05 PROCEDURE — 96368 THER/DIAG CONCURRENT INF: CPT

## 2020-02-05 PROCEDURE — 96411 CHEMO IV PUSH ADDL DRUG: CPT

## 2020-02-05 PROCEDURE — 96413 CHEMO IV INFUSION 1 HR: CPT

## 2020-02-05 PROCEDURE — 96417 CHEMO IV INFUS EACH ADDL SEQ: CPT

## 2020-02-05 PROCEDURE — 6360000002 HC RX W HCPCS: Performed by: INTERNAL MEDICINE

## 2020-02-05 PROCEDURE — 96375 TX/PRO/DX INJ NEW DRUG ADDON: CPT

## 2020-02-05 PROCEDURE — 96415 CHEMO IV INFUSION ADDL HR: CPT

## 2020-02-05 PROCEDURE — 2580000003 HC RX 258: Performed by: INTERNAL MEDICINE

## 2020-02-05 RX ORDER — PALONOSETRON 0.05 MG/ML
INJECTION, SOLUTION INTRAVENOUS
Status: DISPENSED
Start: 2020-02-05 | End: 2020-02-05

## 2020-02-05 RX ORDER — FLUOROURACIL 50 MG/ML
600 INJECTION, SOLUTION INTRAVENOUS ONCE
Status: DISCONTINUED | OUTPATIENT
Start: 2020-02-05 | End: 2020-02-05 | Stop reason: SDUPTHER

## 2020-02-05 RX ORDER — DEXAMETHASONE SODIUM PHOSPHATE 10 MG/ML
INJECTION, SOLUTION INTRAMUSCULAR; INTRAVENOUS
Status: DISPENSED
Start: 2020-02-05 | End: 2020-02-05

## 2020-02-05 RX ORDER — PALONOSETRON 0.05 MG/ML
0.25 INJECTION, SOLUTION INTRAVENOUS ONCE
Status: DISCONTINUED | OUTPATIENT
Start: 2020-02-05 | End: 2020-02-06 | Stop reason: HOSPADM

## 2020-02-07 ENCOUNTER — HOSPITAL ENCOUNTER (OUTPATIENT)
Dept: INFUSION THERAPY | Age: 75
Discharge: HOME | End: 2020-02-07
Payer: MEDICARE

## 2020-02-07 PROCEDURE — 6360000002 HC RX W HCPCS

## 2020-02-07 PROCEDURE — 96523 IRRIG DRUG DELIVERY DEVICE: CPT

## 2020-02-07 RX ORDER — HEPARIN SODIUM (PORCINE) LOCK FLUSH IV SOLN 100 UNIT/ML 100 UNIT/ML
SOLUTION INTRAVENOUS
Status: DISCONTINUED
Start: 2020-02-07 | End: 2020-02-08 | Stop reason: HOSPADM

## 2020-02-17 RX ORDER — DEXAMETHASONE SODIUM PHOSPHATE 4 MG/ML
10 INJECTION, SOLUTION INTRA-ARTICULAR; INTRALESIONAL; INTRAMUSCULAR; INTRAVENOUS; SOFT TISSUE ONCE
Status: CANCELLED | OUTPATIENT
Start: 2020-02-19

## 2020-02-17 RX ORDER — PALONOSETRON 0.05 MG/ML
0.25 INJECTION, SOLUTION INTRAVENOUS ONCE
Status: CANCELLED | OUTPATIENT
Start: 2020-02-19

## 2020-02-18 ENCOUNTER — HOSPITAL ENCOUNTER (OUTPATIENT)
Dept: INFUSION THERAPY | Age: 75
Discharge: HOME | End: 2020-02-18
Payer: MEDICARE

## 2020-02-18 LAB
ALBUMIN SERPL-MCNC: 3.7 GM/DL (ref 3.4–5)
ALP BLD-CCNC: 197 IU/L (ref 40–128)
ALT SERPL-CCNC: 24 U/L (ref 10–40)
ANION GAP SERPL CALCULATED.3IONS-SCNC: 10 MMOL/L (ref 4–16)
AST SERPL-CCNC: 36 IU/L (ref 15–37)
BASOPHILS ABSOLUTE: 0 K/CU MM
BASOPHILS RELATIVE PERCENT: 1 % (ref 0–1)
BILIRUB SERPL-MCNC: 0.3 MG/DL (ref 0–1)
BUN BLDV-MCNC: 12 MG/DL (ref 6–23)
CALCIUM SERPL-MCNC: 9 MG/DL (ref 8.3–10.6)
CHLORIDE BLD-SCNC: 104 MMOL/L (ref 99–110)
CO2: 26 MMOL/L (ref 21–32)
CREAT SERPL-MCNC: 1.1 MG/DL (ref 0.6–1.1)
DIFFERENTIAL TYPE: ABNORMAL
EOSINOPHILS ABSOLUTE: 0.1 K/CU MM
EOSINOPHILS RELATIVE PERCENT: 2 % (ref 0–3)
GFR AFRICAN AMERICAN: 51 ML/MIN/1.73M2
GFR AFRICAN AMERICAN: 59 ML/MIN/1.73M2
GFR NON-AFRICAN AMERICAN: 42 ML/MIN/1.73M2
GFR NON-AFRICAN AMERICAN: 49 ML/MIN/1.73M2
GLUCOSE BLD-MCNC: 115 MG/DL (ref 70–99)
GLUCOSE BLD-MCNC: 118 MG/DL (ref 70–99)
HCT VFR BLD CALC: 28 % (ref 37–47)
HEMOGLOBIN: 8.3 GM/DL (ref 12.5–16)
LYMPHOCYTES ABSOLUTE: 1.3 K/CU MM
LYMPHOCYTES RELATIVE PERCENT: 30 % (ref 24–44)
MCH RBC QN AUTO: 25.7 PG (ref 27–31)
MCHC RBC AUTO-ENTMCNC: 29.6 % (ref 32–36)
MCV RBC AUTO: 86.7 FL (ref 78–100)
MONOCYTES ABSOLUTE: 0.6 K/CU MM
MONOCYTES RELATIVE PERCENT: 14 % (ref 0–4)
PDW BLD-RTO: 17 % (ref 11.7–14.9)
PLATELET # BLD: 168 K/CU MM (ref 140–440)
PMV BLD AUTO: 10.3 FL (ref 7.5–11.1)
POC CALCIUM: 1.22 MMOL/L (ref 1.12–1.32)
POC CHLORIDE: 109 MMOL/L (ref 98–109)
POC CREATININE: 1.2 MG/DL (ref 0.6–1.1)
POTASSIUM SERPL-SCNC: 3.5 MMOL/L (ref 3.6–5.1)
POTASSIUM SERPL-SCNC: 3.9 MMOL/L (ref 3.5–5.1)
RBC # BLD: 3.23 M/CU MM (ref 4.2–5.4)
SEGMENTED NEUTROPHILS ABSOLUTE COUNT: 2.4 K/CU MM
SEGMENTED NEUTROPHILS RELATIVE PERCENT: 53 % (ref 36–66)
SODIUM BLD-SCNC: 140 MMOL/L (ref 135–145)
SODIUM BLD-SCNC: 147 MMOL/L (ref 136–145)
TOTAL PROTEIN: 6.2 GM/DL (ref 6.4–8.2)
WBC # BLD: 4.4 K/CU MM (ref 4–10.5)

## 2020-02-18 PROCEDURE — 36415 COLL VENOUS BLD VENIPUNCTURE: CPT

## 2020-02-18 PROCEDURE — 80053 COMPREHEN METABOLIC PANEL: CPT

## 2020-02-18 PROCEDURE — 85025 COMPLETE CBC W/AUTO DIFF WBC: CPT

## 2020-02-19 ENCOUNTER — HOSPITAL ENCOUNTER (OUTPATIENT)
Dept: INFUSION THERAPY | Age: 75
Discharge: HOME | End: 2020-02-19
Payer: MEDICARE

## 2020-02-19 PROCEDURE — 96415 CHEMO IV INFUSION ADDL HR: CPT

## 2020-02-19 PROCEDURE — 96413 CHEMO IV INFUSION 1 HR: CPT

## 2020-02-19 PROCEDURE — 2580000003 HC RX 258: Performed by: INTERNAL MEDICINE

## 2020-02-19 PROCEDURE — 96368 THER/DIAG CONCURRENT INF: CPT

## 2020-02-19 PROCEDURE — 96411 CHEMO IV PUSH ADDL DRUG: CPT

## 2020-02-19 PROCEDURE — 96375 TX/PRO/DX INJ NEW DRUG ADDON: CPT

## 2020-02-19 PROCEDURE — 96417 CHEMO IV INFUS EACH ADDL SEQ: CPT

## 2020-02-19 PROCEDURE — 6360000002 HC RX W HCPCS

## 2020-02-19 PROCEDURE — 6360000002 HC RX W HCPCS: Performed by: INTERNAL MEDICINE

## 2020-02-19 RX ORDER — PALONOSETRON 0.05 MG/ML
0.25 INJECTION, SOLUTION INTRAVENOUS ONCE
Status: DISCONTINUED | OUTPATIENT
Start: 2020-02-19 | End: 2020-02-20 | Stop reason: HOSPADM

## 2020-02-19 RX ORDER — DEXAMETHASONE SODIUM PHOSPHATE 10 MG/ML
10 INJECTION, SOLUTION INTRAMUSCULAR; INTRAVENOUS ONCE
Status: DISCONTINUED | OUTPATIENT
Start: 2020-02-19 | End: 2020-02-20 | Stop reason: HOSPADM

## 2020-02-19 RX ORDER — PALONOSETRON 0.05 MG/ML
INJECTION, SOLUTION INTRAVENOUS
Status: DISCONTINUED
Start: 2020-02-19 | End: 2020-02-19 | Stop reason: WASHOUT

## 2020-02-19 RX ORDER — DEXAMETHASONE SODIUM PHOSPHATE 10 MG/ML
INJECTION, SOLUTION INTRAMUSCULAR; INTRAVENOUS
Status: DISPENSED
Start: 2020-02-19 | End: 2020-02-19

## 2020-02-19 RX ORDER — DEXAMETHASONE SODIUM PHOSPHATE 10 MG/ML
INJECTION, SOLUTION INTRAMUSCULAR; INTRAVENOUS
Status: DISCONTINUED
Start: 2020-02-19 | End: 2020-02-19 | Stop reason: WASHOUT

## 2020-02-19 RX ORDER — PALONOSETRON 0.05 MG/ML
INJECTION, SOLUTION INTRAVENOUS
Status: DISPENSED
Start: 2020-02-19 | End: 2020-02-19

## 2020-02-21 ENCOUNTER — HOSPITAL ENCOUNTER (OUTPATIENT)
Dept: INFUSION THERAPY | Age: 75
Discharge: HOME | DRG: 378 | End: 2020-02-21
Payer: MEDICARE

## 2020-02-21 PROCEDURE — 6360000002 HC RX W HCPCS

## 2020-02-21 PROCEDURE — 96523 IRRIG DRUG DELIVERY DEVICE: CPT

## 2020-02-21 RX ORDER — HEPARIN SODIUM (PORCINE) LOCK FLUSH IV SOLN 100 UNIT/ML 100 UNIT/ML
SOLUTION INTRAVENOUS
Status: DISCONTINUED
Start: 2020-02-21 | End: 2020-02-22 | Stop reason: HOSPADM

## 2020-02-24 ENCOUNTER — HOSPITAL ENCOUNTER (INPATIENT)
Age: 75
LOS: 2 days | Discharge: HOME | DRG: 378 | End: 2020-02-26
Attending: INTERNAL MEDICINE | Admitting: INTERNAL MEDICINE
Payer: MEDICARE

## 2020-02-24 ENCOUNTER — HOSPITAL ENCOUNTER (EMERGENCY)
Age: 75
Discharge: ANOTHER HOSPITAL | End: 2020-02-24
Attending: EMERGENCY MEDICINE
Payer: MEDICARE

## 2020-02-24 VITALS
BODY MASS INDEX: 27.82 KG/M2 | RESPIRATION RATE: 18 BRPM | OXYGEN SATURATION: 99 % | SYSTOLIC BLOOD PRESSURE: 134 MMHG | HEART RATE: 77 BPM | DIASTOLIC BLOOD PRESSURE: 79 MMHG | TEMPERATURE: 97.9 F | WEIGHT: 167 LBS | HEIGHT: 65 IN

## 2020-02-24 PROBLEM — K92.2 LOWER GI BLEED: Status: ACTIVE | Noted: 2020-02-24

## 2020-02-24 LAB
ALBUMIN SERPL-MCNC: 3.7 GM/DL (ref 3.4–5)
ALP BLD-CCNC: 164 IU/L (ref 40–129)
ALT SERPL-CCNC: 32 U/L (ref 10–40)
ANION GAP SERPL CALCULATED.3IONS-SCNC: 15 MMOL/L (ref 4–16)
APTT: 19.3 SECONDS (ref 25.1–37.1)
AST SERPL-CCNC: 36 IU/L (ref 15–37)
BASOPHILS ABSOLUTE: 0 K/CU MM
BASOPHILS RELATIVE PERCENT: 0.5 % (ref 0–1)
BILIRUB SERPL-MCNC: 0.3 MG/DL (ref 0–1)
BUN BLDV-MCNC: 17 MG/DL (ref 6–23)
CALCIUM SERPL-MCNC: 9.3 MG/DL (ref 8.3–10.6)
CHLORIDE BLD-SCNC: 106 MMOL/L (ref 99–110)
CO2: 22 MMOL/L (ref 21–32)
CREAT SERPL-MCNC: 1.1 MG/DL (ref 0.6–1.1)
DIFFERENTIAL TYPE: ABNORMAL
EOSINOPHILS ABSOLUTE: 0.1 K/CU MM
EOSINOPHILS RELATIVE PERCENT: 1.6 % (ref 0–3)
GFR AFRICAN AMERICAN: 59 ML/MIN/1.73M2
GFR NON-AFRICAN AMERICAN: 49 ML/MIN/1.73M2
GLUCOSE BLD-MCNC: 116 MG/DL (ref 70–99)
HCT VFR BLD CALC: 29.1 % (ref 37–47)
HEMOGLOBIN: 8.4 GM/DL (ref 12.5–16)
IMMATURE NEUTROPHIL %: 0.5 % (ref 0–0.43)
INR BLD: 1.02 INDEX
LIPASE: 68 IU/L (ref 13–60)
LYMPHOCYTES ABSOLUTE: 1.2 K/CU MM
LYMPHOCYTES RELATIVE PERCENT: 27.5 % (ref 24–44)
MCH RBC QN AUTO: 25.7 PG (ref 27–31)
MCHC RBC AUTO-ENTMCNC: 28.9 % (ref 32–36)
MCV RBC AUTO: 89 FL (ref 78–100)
MONOCYTES ABSOLUTE: 0.3 K/CU MM
MONOCYTES RELATIVE PERCENT: 5.6 % (ref 0–4)
PDW BLD-RTO: 17.4 % (ref 11.7–14.9)
PLATELET # BLD: 175 K/CU MM (ref 140–440)
PMV BLD AUTO: 11.6 FL (ref 7.5–11.1)
POTASSIUM SERPL-SCNC: 3.8 MMOL/L (ref 3.5–5.1)
PROTHROMBIN TIME: 11.6 SECONDS (ref 11.7–14.5)
RBC # BLD: 3.27 M/CU MM (ref 4.2–5.4)
SEGMENTED NEUTROPHILS ABSOLUTE COUNT: 2.9 K/CU MM
SEGMENTED NEUTROPHILS RELATIVE PERCENT: 64.3 % (ref 36–66)
SODIUM BLD-SCNC: 143 MMOL/L (ref 135–145)
TOTAL IMMATURE NEUTOROPHIL: 0.02 K/CU MM
TOTAL PROTEIN: 6.7 GM/DL (ref 6.4–8.2)
WBC # BLD: 4.4 K/CU MM (ref 4–10.5)

## 2020-02-24 PROCEDURE — 2580000003 HC RX 258: Performed by: EMERGENCY MEDICINE

## 2020-02-24 PROCEDURE — 83690 ASSAY OF LIPASE: CPT

## 2020-02-24 PROCEDURE — 85610 PROTHROMBIN TIME: CPT

## 2020-02-24 PROCEDURE — 85730 THROMBOPLASTIN TIME PARTIAL: CPT

## 2020-02-24 PROCEDURE — 85025 COMPLETE CBC W/AUTO DIFF WBC: CPT

## 2020-02-24 PROCEDURE — 1200000000 HC SEMI PRIVATE

## 2020-02-24 PROCEDURE — 86900 BLOOD TYPING SEROLOGIC ABO: CPT

## 2020-02-24 PROCEDURE — 80053 COMPREHEN METABOLIC PANEL: CPT

## 2020-02-24 PROCEDURE — 99284 EMERGENCY DEPT VISIT MOD MDM: CPT

## 2020-02-24 RX ORDER — PANTOPRAZOLE SODIUM 40 MG/10ML
40 INJECTION, POWDER, LYOPHILIZED, FOR SOLUTION INTRAVENOUS EVERY 12 HOURS
Status: DISCONTINUED | OUTPATIENT
Start: 2020-02-24 | End: 2020-02-26 | Stop reason: HOSPADM

## 2020-02-24 RX ORDER — PROMETHAZINE HYDROCHLORIDE 25 MG/1
12.5 TABLET ORAL EVERY 6 HOURS PRN
Status: DISCONTINUED | OUTPATIENT
Start: 2020-02-24 | End: 2020-02-26 | Stop reason: HOSPADM

## 2020-02-24 RX ORDER — SODIUM CHLORIDE 0.9 % (FLUSH) 0.9 %
10 SYRINGE (ML) INJECTION EVERY 12 HOURS SCHEDULED
Status: DISCONTINUED | OUTPATIENT
Start: 2020-02-24 | End: 2020-02-26 | Stop reason: HOSPADM

## 2020-02-24 RX ORDER — ONDANSETRON 2 MG/ML
4 INJECTION INTRAMUSCULAR; INTRAVENOUS EVERY 6 HOURS PRN
Status: DISCONTINUED | OUTPATIENT
Start: 2020-02-24 | End: 2020-02-26 | Stop reason: HOSPADM

## 2020-02-24 RX ORDER — SODIUM CHLORIDE 9 MG/ML
INJECTION, SOLUTION INTRAVENOUS CONTINUOUS
Status: DISCONTINUED | OUTPATIENT
Start: 2020-02-24 | End: 2020-02-24 | Stop reason: HOSPADM

## 2020-02-24 RX ORDER — ACETAMINOPHEN 325 MG/1
650 TABLET ORAL EVERY 4 HOURS PRN
Status: DISCONTINUED | OUTPATIENT
Start: 2020-02-24 | End: 2020-02-26 | Stop reason: HOSPADM

## 2020-02-24 RX ORDER — SODIUM CHLORIDE 9 MG/ML
10 INJECTION INTRAVENOUS EVERY 12 HOURS
Status: DISCONTINUED | OUTPATIENT
Start: 2020-02-24 | End: 2020-02-26 | Stop reason: HOSPADM

## 2020-02-24 RX ORDER — SODIUM CHLORIDE 0.9 % (FLUSH) 0.9 %
10 SYRINGE (ML) INJECTION PRN
Status: DISCONTINUED | OUTPATIENT
Start: 2020-02-24 | End: 2020-02-26 | Stop reason: HOSPADM

## 2020-02-24 RX ADMIN — SODIUM CHLORIDE: 9 INJECTION, SOLUTION INTRAVENOUS at 19:33

## 2020-02-24 ASSESSMENT — PAIN SCALES - GENERAL: PAINLEVEL_OUTOF10: 0

## 2020-02-25 LAB
BASOPHILS ABSOLUTE: 0 K/CU MM
BASOPHILS RELATIVE PERCENT: 0.4 % (ref 0–1)
DIFFERENTIAL TYPE: ABNORMAL
EOSINOPHILS ABSOLUTE: 0 K/CU MM
EOSINOPHILS RELATIVE PERCENT: 1.6 % (ref 0–3)
HCT VFR BLD CALC: 20.7 % (ref 37–47)
HCT VFR BLD CALC: 29.7 % (ref 37–47)
HEMOGLOBIN: 8.9 GM/DL (ref 12.5–16)
HEMOGLOBIN: ABNORMAL GM/DL (ref 12.5–16)
IMMATURE NEUTROPHIL %: 0 % (ref 0–0.43)
LYMPHOCYTES ABSOLUTE: 1.1 K/CU MM
LYMPHOCYTES RELATIVE PERCENT: 41.6 % (ref 24–44)
MCH RBC QN AUTO: 26.3 PG (ref 27–31)
MCHC RBC AUTO-ENTMCNC: 29.5 % (ref 32–36)
MCV RBC AUTO: 89.2 FL (ref 78–100)
MONOCYTES ABSOLUTE: 0.3 K/CU MM
MONOCYTES RELATIVE PERCENT: 10.2 % (ref 0–4)
NUCLEATED RBC %: 0 %
PDW BLD-RTO: 17.6 % (ref 11.7–14.9)
PLATELET # BLD: 120 K/CU MM (ref 140–440)
PMV BLD AUTO: 11.1 FL (ref 7.5–11.1)
RBC # BLD: 2.32 M/CU MM (ref 4.2–5.4)
SEGMENTED NEUTROPHILS ABSOLUTE COUNT: 1.2 K/CU MM
SEGMENTED NEUTROPHILS RELATIVE PERCENT: 46.2 % (ref 36–66)
TOTAL IMMATURE NEUTOROPHIL: 0 K/CU MM
TOTAL NUCLEATED RBC: 0 K/CU MM
WBC # BLD: 2.6 K/CU MM (ref 4–10.5)

## 2020-02-25 PROCEDURE — 86901 BLOOD TYPING SEROLOGIC RH(D): CPT

## 2020-02-25 PROCEDURE — 86850 RBC ANTIBODY SCREEN: CPT

## 2020-02-25 PROCEDURE — 6370000000 HC RX 637 (ALT 250 FOR IP): Performed by: INTERNAL MEDICINE

## 2020-02-25 PROCEDURE — 86900 BLOOD TYPING SEROLOGIC ABO: CPT

## 2020-02-25 PROCEDURE — 85014 HEMATOCRIT: CPT

## 2020-02-25 PROCEDURE — 6360000002 HC RX W HCPCS: Performed by: INTERNAL MEDICINE

## 2020-02-25 PROCEDURE — 36415 COLL VENOUS BLD VENIPUNCTURE: CPT

## 2020-02-25 PROCEDURE — 1200000000 HC SEMI PRIVATE

## 2020-02-25 PROCEDURE — 85025 COMPLETE CBC W/AUTO DIFF WBC: CPT

## 2020-02-25 PROCEDURE — 6370000000 HC RX 637 (ALT 250 FOR IP): Performed by: PHYSICIAN ASSISTANT

## 2020-02-25 PROCEDURE — 86922 COMPATIBILITY TEST ANTIGLOB: CPT

## 2020-02-25 PROCEDURE — 85018 HEMOGLOBIN: CPT

## 2020-02-25 PROCEDURE — 86905 BLOOD TYPING RBC ANTIGENS: CPT

## 2020-02-25 PROCEDURE — 2580000003 HC RX 258: Performed by: INTERNAL MEDICINE

## 2020-02-25 PROCEDURE — P9016 RBC LEUKOCYTES REDUCED: HCPCS

## 2020-02-25 PROCEDURE — 36430 TRANSFUSION BLD/BLD COMPNT: CPT

## 2020-02-25 PROCEDURE — 87324 CLOSTRIDIUM AG IA: CPT

## 2020-02-25 PROCEDURE — 2580000003 HC RX 258: Performed by: PHYSICIAN ASSISTANT

## 2020-02-25 PROCEDURE — C9113 INJ PANTOPRAZOLE SODIUM, VIA: HCPCS | Performed by: INTERNAL MEDICINE

## 2020-02-25 RX ORDER — OYSTER SHELL CALCIUM WITH VITAMIN D 500; 200 MG/1; [IU]/1
1 TABLET, FILM COATED ORAL EVERY MORNING
Status: DISCONTINUED | OUTPATIENT
Start: 2020-02-25 | End: 2020-02-26 | Stop reason: HOSPADM

## 2020-02-25 RX ORDER — SIMVASTATIN 40 MG
40 TABLET ORAL NIGHTLY
Status: DISCONTINUED | OUTPATIENT
Start: 2020-02-25 | End: 2020-02-26 | Stop reason: HOSPADM

## 2020-02-25 RX ORDER — LISINOPRIL 20 MG/1
20 TABLET ORAL DAILY
Status: DISCONTINUED | OUTPATIENT
Start: 2020-02-25 | End: 2020-02-26 | Stop reason: HOSPADM

## 2020-02-25 RX ORDER — POTASSIUM CHLORIDE 20 MEQ/1
10 TABLET, EXTENDED RELEASE ORAL ONCE
Status: COMPLETED | OUTPATIENT
Start: 2020-02-25 | End: 2020-02-25

## 2020-02-25 RX ORDER — MULTIVITAMIN WITH FOLIC ACID 400 MCG
1 TABLET ORAL DAILY
Status: DISCONTINUED | OUTPATIENT
Start: 2020-02-25 | End: 2020-02-26 | Stop reason: HOSPADM

## 2020-02-25 RX ORDER — 0.9 % SODIUM CHLORIDE 0.9 %
250 INTRAVENOUS SOLUTION INTRAVENOUS ONCE
Status: COMPLETED | OUTPATIENT
Start: 2020-02-25 | End: 2020-02-25

## 2020-02-25 RX ORDER — FERROUS SULFATE 325(65) MG
325 TABLET ORAL
Status: DISCONTINUED | OUTPATIENT
Start: 2020-02-25 | End: 2020-02-26 | Stop reason: HOSPADM

## 2020-02-25 RX ADMIN — PANTOPRAZOLE SODIUM 40 MG: 40 INJECTION, POWDER, FOR SOLUTION INTRAVENOUS at 00:14

## 2020-02-25 RX ADMIN — FERROUS SULFATE TAB 325 MG (65 MG ELEMENTAL FE) 325 MG: 325 (65 FE) TAB at 09:25

## 2020-02-25 RX ADMIN — SODIUM CHLORIDE, PRESERVATIVE FREE 10 ML: 5 INJECTION INTRAVENOUS at 21:22

## 2020-02-25 RX ADMIN — PANTOPRAZOLE SODIUM 40 MG: 40 INJECTION, POWDER, FOR SOLUTION INTRAVENOUS at 21:22

## 2020-02-25 RX ADMIN — SODIUM CHLORIDE, PRESERVATIVE FREE 10 ML: 5 INJECTION INTRAVENOUS at 21:23

## 2020-02-25 RX ADMIN — SIMVASTATIN 40 MG: 40 TABLET, FILM COATED ORAL at 21:22

## 2020-02-25 RX ADMIN — PANTOPRAZOLE SODIUM 40 MG: 40 INJECTION, POWDER, FOR SOLUTION INTRAVENOUS at 09:28

## 2020-02-25 RX ADMIN — SODIUM CHLORIDE, PRESERVATIVE FREE 10 ML: 5 INJECTION INTRAVENOUS at 09:29

## 2020-02-25 RX ADMIN — OYSTER SHELL CALCIUM WITH VITAMIN D 1 TABLET: 500; 200 TABLET, FILM COATED ORAL at 09:25

## 2020-02-25 RX ADMIN — SODIUM CHLORIDE, PRESERVATIVE FREE 10 ML: 5 INJECTION INTRAVENOUS at 00:14

## 2020-02-25 RX ADMIN — SODIUM CHLORIDE 250 ML: 9 INJECTION, SOLUTION INTRAVENOUS at 13:35

## 2020-02-25 RX ADMIN — LISINOPRIL 20 MG: 20 TABLET ORAL at 09:24

## 2020-02-25 RX ADMIN — POTASSIUM CHLORIDE 10 MEQ: 20 TABLET, EXTENDED RELEASE ORAL at 02:23

## 2020-02-25 RX ADMIN — THERA TABS 1 TABLET: TAB at 09:24

## 2020-02-25 ASSESSMENT — ENCOUNTER SYMPTOMS
EYES NEGATIVE: 1
ABDOMINAL PAIN: 0
DIARRHEA: 1
VOMITING: 0
RESPIRATORY NEGATIVE: 1

## 2020-02-25 NOTE — PLAN OF CARE
Problem: Discharge Planning:  Goal: Discharged to appropriate level of care  Description  Discharged to appropriate level of care  Outcome: Ongoing     Problem: Bowel Function - Altered:  Goal: Bowel elimination is within specified parameters  Description  Bowel elimination is within specified parameters  Outcome: Ongoing     Problem: Fluid Volume - Imbalance:  Goal: Will show no signs and symptoms of excessive bleeding  Description  Will show no signs and symptoms of excessive bleeding  Outcome: Ongoing  Goal: Absence of imbalanced fluid volume signs and symptoms  Description  Absence of imbalanced fluid volume signs and symptoms  Outcome: Ongoing     Problem: Nausea/Vomiting:  Goal: Absence of nausea/vomiting  Description  Absence of nausea/vomiting  Outcome: Ongoing  Goal: Able to drink  Description  Able to drink  Outcome: Ongoing  Goal: Able to eat  Description  Able to eat  Outcome: Ongoing  Goal: Ability to achieve adequate nutritional intake will improve  Description  Ability to achieve adequate nutritional intake will improve  Outcome: Ongoing     Problem: Physical Regulation:  Goal: Will remain free from infection  Description  Will remain free from infection  Outcome: Ongoing  Goal: Complications related to the disease process, condition or treatment will be avoided or minimized  Description  Complications related to the disease process, condition or treatment will be avoided or minimized  Outcome: Ongoing     Problem:  Activity:  Goal: Ability to tolerate increased activity will improve  Description  Ability to tolerate increased activity will improve  Outcome: Ongoing  Goal: Ability to implement measures to reduce episodes of fatigue will improve  Description  Ability to implement measures to reduce episodes of fatigue will improve  Outcome: Ongoing     Problem: Coping:  Goal: Ability to cope will improve  Description  Ability to cope will improve  Outcome: Ongoing     Problem: Health Behavior:  Goal: Adoption of positive health habits will improve  Description  Adoption of positive health habits will improve  Outcome: Ongoing  Goal: Identification of resources available to assist in meeting health care needs will improve  Description  Identification of resources available to assist in meeting health care needs will improve  Outcome: Ongoing  Goal: Ability to verbalize follow-up procedures will improve  Description  Ability to verbalize follow-up procedures will improve  Outcome: Ongoing     Problem: Nutritional:  Goal: Nutritional status will improve  Description  Nutritional status will improve  Outcome: Ongoing     Problem: Safety:  Goal: Ability to remain free from injury will improve  Description  Ability to remain free from injury will improve  Outcome: Ongoing     Problem: Sensory:  Goal: Ability to develop a pain control plan will improve  Description  Ability to develop a pain control plan will improve  Outcome: Ongoing

## 2020-02-25 NOTE — ED PROVIDER NOTES
activity: Yes     Partners: Male     Comment:    Lifestyle    Physical activity:     Days per week: Not on file     Minutes per session: Not on file    Stress: Not on file   Relationships    Social connections:     Talks on phone: Not on file     Gets together: Not on file     Attends [employer]ist service: Not on file     Active member of club or organization: Not on file     Attends meetings of clubs or organizations: Not on file     Relationship status: Not on file    Intimate partner violence:     Fear of current or ex partner: Not on file     Emotionally abused: Not on file     Physically abused: Not on file     Forced sexual activity: Not on file   Other Topics Concern    Not on file   Social History Narrative            Never use alcohol        Never use tobacco        Never use drugs        Caffeine 2 cups of coffee per day 1 soda every day Iced tea        Retired [employer] worker             Past Surgical History:   Procedure Laterality Date    ABDOMEN SURGERY      BREAST LUMPECTOMY Right 1980's    Benign    CARDIAC VALVE REPLACEMENT  7/30/14    AVR (Medtronic tissue valve; Dr. Rosalio Bridges) w/CABG X1    COLONOSCOPY N/A 11/13/2019    COLONOSCOPY DIAGNOSTIC/STOMA performed by Alma Minor MD at [place] [address]  7/30/14    CABG x1 (LIMA to LAD) w/AVR     DENTAL SURGERY      Teeth Extracted In Past    DIAGNOSTIC CARDIAC CATH LAB PROCEDURE  7/28/14    Critical aortic stenosis, 70-80% proximal LAD lesion w/ulcerated plaque, aortic root dilatation, LVgram not done.     HYSTERECTOMY, TOTAL ABDOMINAL  1980's    \"not sure if they took the ovaries\"    OTHER SURGICAL HISTORY  10/07/2017    exp lap, small bowel resection, sigmoid colon resection, partial bladder repair, creation of colostomy    TUNNELED VENOUS PORT PLACEMENT  1025/2017    per old chart pt had port insertion with admission 10/2017     Past Medical History:   Diagnosis Date    Abnormal CT and had thoracentesis and chest tube placement done 10/22/2017 and then on 10/26/2017 had thoracentesis and removal of chest tube - also had pt had intrapleural installation of tPA 10/24/2017    Pneumonia Dx 1990's    Prolonged emergence from general anesthesia     \"alittle trouble waking me up\"    S/P CABG x 1 8/12/2014 7/30/14 [facility]- LAD Dr Meza Sayer Teeth missing     Lower    Wears glasses      Allergies   Allergen Reactions    Prilosec [Omeprazole]      \"Got Real Lightheaded\"    Pravachol [Pravastatin Sodium]      \"I Don't Remember The Reaction\"     Prior to Admission medications    Medication Sig Start Date End Date Taking? Authorizing Provider   Ferrous Sulfate (IRON PO) Take by mouth   Yes Historical Provider, MD   aspirin 81 MG tablet Take 4 tablets by mouth daily 1/7/20  Yes Олег Reinoso MD   pantoprazole (PROTONIX) 40 MG tablet Take 1 tablet by mouth 2 times daily (before meals) 1/7/20  Yes Олег Reinoso MD   Potassium 99 MG TABS Take 1 tablet by mouth daily   Yes Historical Provider, MD   lisinopril (PRINIVIL;ZESTRIL) 20 MG tablet Take 20 mg by mouth daily   Yes Historical Provider, MD   bismuth subsalicylate (PEPTO BISMOL) 262 MG/15ML suspension Take 15 mLs by mouth every 6 hours as needed for Indigestion   Yes Historical Provider, MD   Apoaequorin (PREVAGEN PO) Take 1 each by mouth daily    Yes Historical Provider, MD   Ostomy Supplies (OSTOMY DRAINABLE POUCH/FLANGE) MISC 1 device replaced twice weekly prn 5/16/19  Yes Digna Hermosillo MD   Multiple Vitamins-Minerals (MULTIVITAMIN PO) Take 1 each by mouth daily    Yes Historical Provider, MD   simvastatin (ZOCOR) 40 MG tablet Take 40 mg by mouth nightly. Yes Historical Provider, MD   Calcium Carbonate-Vitamin D (CALCIUM + D) 600-200 MG-UNIT TABS Take 1 tablet by mouth every morning.  Over The Counter   Yes Historical Provider, MD       /79   Pulse 77   Temp 97.9 °F (36.6 °C)   Resp 18   Ht 5' 5\" (1.651 m)   Wt 167 lb (75.8 K/CU MM    Monocytes Absolute 0.3 K/CU MM    Eosinophils Absolute 0.1 K/CU MM    Basophils Absolute 0.0 K/CU MM    Immature Neutrophil % 0.5 (H) 0 - 0.43 %    Total Immature Neutrophil 0.02 K/CU MM   Comprehensive Metabolic Panel   Result Value Ref Range    Sodium 143 135 - 145 MMOL/L    Potassium 3.8 3.5 - 5.1 MMOL/L    Chloride 106 99 - 110 mMol/L    CO2 22 21 - 32 MMOL/L    BUN 17 6 - 23 MG/DL    CREATININE 1.1 0.6 - 1.1 MG/DL    Glucose 116 (H) 70 - 99 MG/DL    Calcium 9.3 8.3 - 10.6 MG/DL    Alb 3.7 3.4 - 5.0 GM/DL    Total Protein 6.7 6.4 - 8.2 GM/DL    Total Bilirubin 0.3 0.0 - 1.0 MG/DL    ALT 32 10 - 40 U/L    AST 36 15 - 37 IU/L    Alkaline Phosphatase 164 (H) 40 - 129 IU/L    GFR Non- 49 (L) >60 mL/min/1.73m2    GFR  59 (L) >60 mL/min/1.73m2    Anion Gap 15 4 - 16   Lipase   Result Value Ref Range    Lipase 68 (H) 13 - 60 IU/L   Protime/INR & PTT   Result Value Ref Range    Protime 11.6 (L) 11.7 - 14.5 SECONDS    INR 1.02 INDEX    aPTT 19.3 (L) 25.1 - 37.1 SECONDS           My typical dicussion, presentation,and considerations for this patients' chief complaint, diagnosis, differential diagnosis, medications, medication use,  medication safety and medication interactions have been explained and outlined to this patient for thispatient encounter. I have contacted hospital at [facility] and the patient will be transferred. She is hemodynamically stable and she has responded to IVF and gentle hydration. She does not require emergent transfusion. Final Impression    1.  Acute lower GI bleeding             Donal Oviedo,   02/25/20 0732

## 2020-02-25 NOTE — CARE COORDINATION
Met with pt and spouse at bedside to initiate discharge planning. Pt lives at home with spouse, remains fully ind with ADLs including driving. Pt has pcp/ active insurance and can afford meds. Pt denied discharge needs, hopeful she can go home asap as she states she is feeling much better. Advised CM remains available if needs arise.

## 2020-02-25 NOTE — PROGRESS NOTES
Hospitalist Progress Note      Name:  Wesley Ojeda /Age/Sex: 1945  (87 y.o. female)   MRN & CSN:  [id number] & [id number] Admission Date/Time: 2020 10:45 PM   Location:  [address] [place] [address]- PCP: Marcelo Boucehr MD         Hospital Day: 2    Assessment and Plan:   Wesley Ojeda is a 69 y.o.  female  who presents with GI bleed    1. Acute blood loss Anemia 2/2 LGI Bleed  -Reported bright red blood in the colostomy bag.  -Hb 8.4--6.1  -Ordered 1 unit of PRBC to be transfused  -General Surgery on board; awaiting input      2. Hypertension: Continue lisinopril     3. S/P Aortic valve replacement(2014), single-vessel bypass(LiMa to LAD-2014)     4. History of colon cancer status post sigmoid colon resection(10/2017) w/end colostomy, bladder dome resection and closure, segment of small bowel resected, with lung metastasis  -currently on chemotherapy(last dose on Tuesday)     5. Hyperlipidemia-continue simvastatin     6. GERD : Continue Protonix      7. Chronic anemia : Continue ferrous sulfate 325 mg daily     Diet Diet NPO, After Midnight   DVT Prophylaxis [] Lovenox, []  Heparin, [] SCDs, [] Ambulation   GI Prophylaxis [] PPI,  [] H2 Blocker,  [] Carafate,  [] Diet/Tube Feeds   Code Status Full Code   Disposition Home   MDM      History of Present Illness:     Patient was seen and examined  Denied any chest pain or abdominal pain  No dizziness, palpitations  No fever, chills, or further blood loss    Ten point ROS reviewed negative, unless as noted above    Objective:   No intake or output data in the 24 hours ending 20 1130   Vitals:   Vitals:    20 1114   BP: (!) 144/77   Pulse: 81   Resp: 17   Temp: 98 °F (36.7 °C)   SpO2: 100%     Physical Exam:   GEN Awake female, sitting upright in bed in no apparent distress. Appears given age. EYES Pupils are equally round. No scleral erythema, discharge, or conjunctivitis.   HENT Mucous membranes are moist. Oral pharynx without exudates, no

## 2020-02-25 NOTE — ED NOTES
Small amount of blood in colostomy prior to leaving ED, no clots noted.       Bossman Jacobo RN  02/24/20 [id number]

## 2020-02-25 NOTE — ED NOTES
Report given to Dee Briones at [facility].       Genesis Raymundo, SHELLEY  02/24/20 [id number]

## 2020-02-25 NOTE — H&P
hypertrophy of septal wall, no regional wall motion abnormalities, diastolic dysfunction Grade I, all chamber dimensions WNL, possible stenosis of the bioprosthetic aortic valve w/mean pressure of 23 mmHg, mild TR, RVSP= 26 mmHg, EF 55-60%. Compared to previous echo, prosthetic AOV mean pressure shows slight decrease from 25 to 23 mmHg.  Heart murmur     [place] (hard of hearing)     Bilateral Ears    Hyperlipidemia     Hypertension     \"use to be on b/p medication and took it untill 10/2017 and they never put me back on it\"    MR (congenital mitral regurgitation)     Multiple lung nodules on CT 3/7/2019    Nocturnal hypoxemia 11/3/2017    Pleural effusion     per old chart pt dx with large pleural effusion and had thoracentesis and chest tube placement done 10/22/2017 and then on 10/26/2017 had thoracentesis and removal of chest tube - also had pt had intrapleural installation of tPA 10/24/2017    Pneumonia Dx 1990's    Prolonged emergence from general anesthesia     \"alittle trouble waking me up\"    S/P CABG x 1 8/12/2014 7/30/14 [facility]- LAD Dr Levine Given Teeth missing     Lower    Wears glasses      Past Surgical History:   Procedure Laterality Date    ABDOMEN SURGERY      BREAST LUMPECTOMY Right 1980's    Benign    CARDIAC VALVE REPLACEMENT  7/30/14    AVR (Medtronic tissue valve; Dr. Landon Arvizu) w/CABG X1    COLONOSCOPY N/A 11/13/2019    COLONOSCOPY DIAGNOSTIC/STOMA performed by Tashi Kulkarni MD at [place] [address]  7/30/14    CABG x1 (LIMA to LAD) w/AVR     DENTAL SURGERY      Teeth Extracted In Past    DIAGNOSTIC CARDIAC CATH LAB PROCEDURE  7/28/14    Critical aortic stenosis, 70-80% proximal LAD lesion w/ulcerated plaque, aortic root dilatation, LVgram not done.     HYSTERECTOMY, TOTAL ABDOMINAL  1980's    \"not sure if they took the ovaries\"    OTHER SURGICAL HISTORY  10/07/2017    exp lap, small bowel resection, sigmoid colon resection, partial bladder repair, creation of colostomy    TUNNELED VENOUS PORT PLACEMENT  1025/2017    per old chart pt had port insertion with admission 10/2017     Family History   Problem Relation Age of Onset    Arthritis Mother     Depression Mother     Hearing Loss Mother     Cancer Father         colon cancer    Heart Disease Brother         Heart Surgery    Migraines Son      Family Hx of HTN  Family Hx as reviewed above, otherwise non-contributory  Social History     Socioeconomic History    Marital status:      Spouse name: Not on file    Number of children: Not on file    Years of education: Not on file    Highest education level: Not on file   Occupational History    Not on file   Social Needs    Financial resource strain: Not on file    Food insecurity:     Worry: Not on file     Inability: Not on file    Transportation needs:     Medical: Not on file     Non-medical: Not on file   Tobacco Use    Smoking status: Never Smoker    Smokeless tobacco: Never Used    Tobacco comment: Reviewed   Substance and Sexual Activity    Alcohol use: No    Drug use: No    Sexual activity: Yes     Partners: Male     Comment:    Lifestyle    Physical activity:     Days per week: Not on file     Minutes per session: Not on file    Stress: Not on file   Relationships    Social connections:     Talks on phone: Not on file     Gets together: Not on file     Attends Taoist service: Not on file     Active member of club or organization: Not on file     Attends meetings of clubs or organizations: Not on file     Relationship status: Not on file    Intimate partner violence:     Fear of current or ex partner: Not on file     Emotionally abused: Not on file     Physically abused: Not on file     Forced sexual activity: Not on file   Other Topics Concern    Not on file   Social History Narrative            Never use alcohol        Never use tobacco        Never use drugs        Caffeine 2 cups of coffee per [Pravastatin Sodium]      \"I Don't Remember The Reaction\"       REVIEW OF SYSTEMS   Within above limitations. 14 point review of systems reviewed. Pertinent positive or negative as per HPI or otherwise negative per 14 point systems review. PHYSICAL EXAM     Wt Readings from Last 3 Encounters:   02/24/20 150 lb 1.6 oz (68.1 kg)   02/24/20 167 lb (75.8 kg)   02/04/20 167 lb (75.8 kg)       Blood pressure (!) 156/69, pulse 84, temperature 97.8 °F (36.6 °C), temperature source Oral, resp. rate 16, height 5' 5\" (1.651 m), weight 150 lb 1.6 oz (68.1 kg), SpO2 96 %, not currently breastfeeding. GEN  -Awake, alert, NAD.   EYES   -PERRL. HENT  -MM are moist.   RESP  -LS CTA equal bilat, no wheezes, rales or rhonchi. Symmetric chest movement. No respiratory distress noted. C/V  -S1/S2 auscultated. RRR, murmur present. Peripheral pulses equal bilaterally and palpable. No peripheral edema. No reproducible chest wall tenderness. GI  -Abdomen is soft, non-distended, no significant tenderness. No masses or guarding. + BS in all quadrants. Rectal exam deferred. Colostomy bag on the left with blood stain, no stool, no clots.   -No CVA tenderness. Gambino catheter is not present. MS  -B/L extremities- No gross joint deformities. No swelling, intact sensation symmetrical.   SKIN  -Normal coloration, warm, dry. NEURO  -CN 2-12 appear grossly intact, normal speech, no lateralizing weakness. PSYC  -Awake, alert, oriented x 4. Appropriate affect. LABS AND IMAGING     Results for Rosa Maria Recinos (MRN [id number]) as of 2/25/2020 00:52   Ref.  Range 2/24/2020 19:36   Sodium Latest Ref Range: 135 - 145 MMOL/L 143   Potassium Latest Ref Range: 3.5 - 5.1 MMOL/L 3.8   Chloride Latest Ref Range: 99 - 110 mMol/L 106   CO2 Latest Ref Range: 21 - 32 MMOL/L 22   BUN Latest Ref Range: 6 - 23 MG/DL 17   Creatinine Latest Ref Range: 0.6 - 1.1 MG/DL 1.1   Anion Gap Latest Ref Range: 4 - 16  15   GFR Non- Latest Ref reporting bright red blood in the colostomy bag.  -Hemoglobin stable-was 8.3 2/18/2020, 8.4 today.  -We repeat CBC. Transfuse if hemoglobin less than 7.  -Consulted general surgery (patient known to Dr Fred Sanders)  -N.p.o. after midnight   -We will continue with IV Protonix twice daily as upper GI bleed cannot be excluded. 2.  Hypertension: Continue lisinopril    3. aortic valve replacement(7/2014), single-vessel bypass(LiMa to LAD-7/30/2014)    4. history of colon cancer status post sigmoid colon resection(10/2017) w/end colostomy, bladder dome resection and closure, segment of small bowel resected, with lung metastasis  -currently on chemotherapy(last dose on Tuesday)    5. Hyperlipidemia-continue simvastatin    6. GERD : Continue Protonix     7. Chronic anemia : Continue ferrous sulfate 325 mg daily     DVT Prophylaxis: Hold chemical prophylaxis due to GI bleed  GI Prophylaxis: On IV Protonix  Code Status:FULL.       Case d/w ED physician    Claudia Hull MD  Hospitalist, Internal Medicine  2/24/2020 at 11:26 PM

## 2020-02-26 VITALS
BODY MASS INDEX: 25.18 KG/M2 | HEART RATE: 64 BPM | OXYGEN SATURATION: 98 % | HEIGHT: 65 IN | RESPIRATION RATE: 16 BRPM | SYSTOLIC BLOOD PRESSURE: 117 MMHG | DIASTOLIC BLOOD PRESSURE: 67 MMHG | WEIGHT: 151.1 LBS | TEMPERATURE: 97.6 F

## 2020-02-26 LAB
CLOSTRIDIUM DIFFICILE, PCR: NORMAL
CLOSTRIDIUM DIFFICILE, PCR: NORMAL
HCT VFR BLD CALC: 29.4 % (ref 37–47)
HEMOGLOBIN: 8.8 GM/DL (ref 12.5–16)
MCH RBC QN AUTO: 26.3 PG (ref 27–31)
MCHC RBC AUTO-ENTMCNC: 29.9 % (ref 32–36)
MCV RBC AUTO: 88 FL (ref 78–100)
PDW BLD-RTO: 17.1 % (ref 11.7–14.9)
PLATELET # BLD: 158 K/CU MM (ref 140–440)
PMV BLD AUTO: 10.6 FL (ref 7.5–11.1)
RBC # BLD: 3.34 M/CU MM (ref 4.2–5.4)
WBC # BLD: 5.2 K/CU MM (ref 4–10.5)

## 2020-02-26 PROCEDURE — 99233 SBSQ HOSP IP/OBS HIGH 50: CPT | Performed by: PHYSICIAN ASSISTANT

## 2020-02-26 PROCEDURE — 2580000003 HC RX 258: Performed by: INTERNAL MEDICINE

## 2020-02-26 PROCEDURE — 94761 N-INVAS EAR/PLS OXIMETRY MLT: CPT

## 2020-02-26 PROCEDURE — 6370000000 HC RX 637 (ALT 250 FOR IP): Performed by: INTERNAL MEDICINE

## 2020-02-26 PROCEDURE — C9113 INJ PANTOPRAZOLE SODIUM, VIA: HCPCS | Performed by: INTERNAL MEDICINE

## 2020-02-26 PROCEDURE — 99211 OFF/OP EST MAY X REQ PHY/QHP: CPT

## 2020-02-26 PROCEDURE — 85027 COMPLETE CBC AUTOMATED: CPT

## 2020-02-26 PROCEDURE — 6360000002 HC RX W HCPCS: Performed by: INTERNAL MEDICINE

## 2020-02-26 RX ORDER — HEPARIN SODIUM (PORCINE) LOCK FLUSH IV SOLN 100 UNIT/ML 100 UNIT/ML
500 SOLUTION INTRAVENOUS ONCE
Status: COMPLETED | OUTPATIENT
Start: 2020-02-26 | End: 2020-02-26

## 2020-02-26 RX ORDER — FERROUS SULFATE 325(65) MG
325 TABLET ORAL
Qty: 30 TABLET | Refills: 3 | Status: ON HOLD | OUTPATIENT
Start: 2020-02-27 | End: 2020-12-18 | Stop reason: HOSPADM

## 2020-02-26 RX ADMIN — FERROUS SULFATE TAB 325 MG (65 MG ELEMENTAL FE) 325 MG: 325 (65 FE) TAB at 09:06

## 2020-02-26 RX ADMIN — Medication 500 UNITS: at 12:10

## 2020-02-26 RX ADMIN — SODIUM CHLORIDE, PRESERVATIVE FREE 10 ML: 5 INJECTION INTRAVENOUS at 10:55

## 2020-02-26 RX ADMIN — THERA TABS 1 TABLET: TAB at 09:06

## 2020-02-26 RX ADMIN — OYSTER SHELL CALCIUM WITH VITAMIN D 1 TABLET: 500; 200 TABLET, FILM COATED ORAL at 09:05

## 2020-02-26 RX ADMIN — PANTOPRAZOLE SODIUM 40 MG: 40 INJECTION, POWDER, FOR SOLUTION INTRAVENOUS at 10:52

## 2020-02-26 RX ADMIN — LISINOPRIL 20 MG: 20 TABLET ORAL at 09:06

## 2020-02-26 ASSESSMENT — ENCOUNTER SYMPTOMS
ABDOMINAL PAIN: 0
APNEA: 0
EYE ITCHING: 0
COLOR CHANGE: 0
VOMITING: 0
STRIDOR: 0
ANAL BLEEDING: 0
SORE THROAT: 0
PHOTOPHOBIA: 0
BLOOD IN STOOL: 0
EYE REDNESS: 0
CONSTIPATION: 0
CHOKING: 0
BACK PAIN: 0
RECTAL PAIN: 0
NAUSEA: 0

## 2020-02-26 ASSESSMENT — PAIN SCALES - GENERAL: PAINLEVEL_OUTOF10: 0

## 2020-02-26 NOTE — CONSULTS
[facility] Continence Nurse  Consult Note       Becky Womack  AGE: 69 y.o. GENDER: female  : 1945  TODAY'S DATE:  2020    Subjective:     Reason for  Evaluation and Assessment: colostomy assessment      Becky Womack is a 69 y.o. female referred by:   [x] Physician  [] Nursing  [] Other:     Wound Identification:  Wound Type: colostomy   Contributing Factors: none        PAST MEDICAL HISTORY        Diagnosis Date    Abnormal CT scan     \"4/10/2019\"found spot on the lung and liver\"     Abnormal finding on EKG     Acid reflux     Aortic stenosis     Aortic valve prosthesis present 2014 Dr Noemi Vera #23 Medtronic tissue valve     AR (aortic regurgitation)     Arthritis     \"Hands Mostly\"    CAD (coronary artery disease)     S/P CABG x1 2014-follows with Dr Vick [facility])     colon\"dx in 10/2017- tx with surg and chemo- following with Dr Anita Sandra [facility])     COPD, mild ([address] 2018    Diabetes mellitus ([address]     \"at one time was borderline- but never on medication- and no one said anything about me having this at present\"    Family history of coronary artery disease     Fatigue     H/O 24 hour EKG monitoring 2018    48-hour Holter monitor showed normal sinus rhythm average rate is 72 bpm lowest rate of 52 occurred at 2:20 AM fastest rate of 104 occurred at 6 PM.  No significant ventricular or supraventricular ectopy is noted. Patient submitted a diary did not report any activities or symptoms during the monitoring    H/O cardiovascular stress test 12- Normal study-EF 70%    H/O echocardiogram 19; 2018    LV function and size normal w/mild asymmetric LV hypertrophy of septal wall, no regional wall motion abnormalities, diastolic dysfunction Grade I, all chamber dimensions WNL, possible stenosis of the bioprosthetic aortic valve w/mean pressure of 23 mmHg, mild TR, RVSP= 26 mmHg, EF 55-60%.   Compared to previous echo, prosthetic AOV mean pressure shows slight decrease from 25 to 23 mmHg.  Heart murmur     [place] (hard of hearing)     Bilateral Ears    Hyperlipidemia     Hypertension     \"use to be on b/p medication and took it untill 10/2017 and they never put me back on it\"    MR (congenital mitral regurgitation)     Multiple lung nodules on CT 3/7/2019    Nocturnal hypoxemia 11/3/2017    Pleural effusion     per old chart pt dx with large pleural effusion and had thoracentesis and chest tube placement done 10/22/2017 and then on 10/26/2017 had thoracentesis and removal of chest tube - also had pt had intrapleural installation of tPA 10/24/2017    Pneumonia Dx 1990's    Prolonged emergence from general anesthesia     \"alittle trouble waking me up\"    S/P CABG x 1 8/12/2014 7/30/14 [facility]- LAD Dr Emma Daigle Teeth missing     Lower    Wears glasses        PAST SURGICAL HISTORY    Past Surgical History:   Procedure Laterality Date    ABDOMEN SURGERY      BREAST LUMPECTOMY Right 1980's    Benign    CARDIAC VALVE REPLACEMENT  7/30/14    AVR (Medtronic tissue valve; Dr. Ramez Dsouza) w/CABG X1    COLONOSCOPY N/A 11/13/2019    COLONOSCOPY DIAGNOSTIC/STOMA performed by Tutu Barrera MD at [place] [address]  7/30/14    CABG x1 (LIMA to LAD) w/AVR     DENTAL SURGERY      Teeth Extracted In Past    DIAGNOSTIC CARDIAC CATH LAB PROCEDURE  7/28/14    Critical aortic stenosis, 70-80% proximal LAD lesion w/ulcerated plaque, aortic root dilatation, LVgram not done.     HYSTERECTOMY, TOTAL ABDOMINAL  1980's    \"not sure if they took the ovaries\"    OTHER SURGICAL HISTORY  10/07/2017    exp lap, small bowel resection, sigmoid colon resection, partial bladder repair, creation of colostomy    TUNNELED VENOUS PORT PLACEMENT  1025/2017    per old chart pt had port insertion with admission 10/2017       FAMILY HISTORY    Family History   Problem Relation Age of Onset    Arthritis Mother    Vanessa Rodriguez

## 2020-02-26 NOTE — PROGRESS NOTES
General Surgery  Dr. Sheng Gross and Yon Tai, [facility] Day: 3    ChiefComplaint on Admission: GI bleed      Subjective:     Sammy Ceja is a 69 y.o. female with   concerns for GI bleed. Patient denies abdominal pain, hematochezia, melena, nausea and vomiting. Also denies fatigue. Tolerating DIET LOW SODIUM 2 GM;. +ve BM via permanent colostomy. Pt reports that her bleeding seems to have resolved. ROS:  Review of Systems   Constitutional: Negative for chills and fever. HENT: Negative for ear pain, mouth sores, sore throat and tinnitus. Eyes: Negative for photophobia, redness and itching. Respiratory: Negative for apnea, choking and stridor. Cardiovascular: Negative for chest pain and palpitations. Gastrointestinal: Negative for abdominal pain, anal bleeding, blood in stool, constipation, nausea, rectal pain and vomiting. Endocrine: Negative for polydipsia. Genitourinary: Negative for enuresis, flank pain and hematuria. Musculoskeletal: Negative for back pain, joint swelling and myalgias. Skin: Negative for color change and pallor. Allergic/Immunologic: Negative for environmental allergies. Neurological: Negative for syncope and speech difficulty. Psychiatric/Behavioral: Negative for confusion and hallucinations.        Allergies  Prilosec [omeprazole] and Pravachol [pravastatin sodium]          Diagnosis Date    Abnormal CT scan     \"4/10/2019\"found spot on the lung and liver\"     Abnormal finding on EKG     Acid reflux     Aortic stenosis     Aortic valve prosthesis present 8/12/2014 7/30/14 Dr Manish Chavez #23 Medtronic tissue valve     AR (aortic regurgitation)     Arthritis     \"Hands Mostly\"    CAD (coronary artery disease)     S/P CABG x1 7/2014-follows with Dr Doreen [facility])     colon\"dx in 10/2017- tx with surg and chemo- following with Dr Krystle Hernández [facility])     COPD, mild ([address] 9/6/2018    Diabetes mellitus ([address]     \"at one time was - Temp: 97.6 °F (36.4 °C); Max - Temp  Av.8 °F (36.6 °C)  Min: 97.1 °F (36.2 °C)  Max: 98.1 °F (36.7 °C)    No intake/output data recorded. I/O last 3 completed shifts: In: 497.9 [Blood:497.9]  Out: -       Physical Exam:  Physical Exam  Constitutional:       Appearance: She is well-developed. HENT:      Head: Normocephalic. Eyes:      Pupils: Pupils are equal, round, and reactive to light. Neck:      Musculoskeletal: Normal range of motion and neck supple. Cardiovascular:      Rate and Rhythm: Normal rate. Pulmonary:      Effort: Pulmonary effort is normal.   Abdominal:      General: There is no distension. Palpations: Abdomen is soft. There is no mass. Tenderness: There is no abdominal tenderness. There is no guarding or rebound. Comments: Colostomy to LLQ present with minimal output in bag. The wafer appears to have been cut too large for the stoma with edematous skin visible through ostomy bag. No active bleeding noted, and no blood in bag. Musculoskeletal: Normal range of motion. Skin:     General: Skin is warm. Neurological:      Mental Status: She is alert and oriented to person, place, and time.              Scheduled Meds:   calcium-vitamin D  1 tablet Oral QAM    lisinopril  20 mg Oral Daily    multivitamin  1 tablet Oral Daily    simvastatin  40 mg Oral Nightly    ferrous sulfate  325 mg Oral Daily with breakfast    sodium chloride flush  10 mL Intravenous 2 times per day    pantoprazole  40 mg Intravenous Q12H    And    sodium chloride (PF)  10 mL Intravenous Q12H     Continuous Infusions:  PRN Meds:sodium chloride flush, acetaminophen, promethazine, ondansetron      Labs/Imaging Results:   Lab Results   Component Value Date    WBC 5.2 2020    HGB 8.8 (L) 2020    HCT 29.4 (L) 2020    MCV 88.0 2020     2020     Lab Results   Component Value Date     2020    K 3.8 2020     2020    CO2 22 2020

## 2020-02-26 NOTE — DISCHARGE SUMMARY
Discharge Summary    Name:  Wesley Ojeda /Age/Sex: 1945  (69 y.o. female)   MRN & CSN:  [id number] & [id number] Admission Date/Time: 2020 10:45 PM   Attending:  Geoffrey Tipton MD Discharging Physician: Tony Sims MD     Hospital Course:   Wesley Ojeda is a 69 y.o.  female  who presents with GI bleed    Patient was seen and examined  Denied any further bleeding   No abdominal pain or dizziness  No fever, chills, SOB    Assessment and plan  1. Acute blood loss Anemia 2/2 LGI Bleed  -Reported bright red blood in the colostomy bag.  -Hb 8.4--6.1-8.8  -Transfused 1 unit of PRBC to be transfused  -General Surgery on board; plans to follow up as OP  -Reduce ASA dose        2.  Hypertension: Continue lisinopril     3. S/P Aortic valve replacement(2014), single-vessel bypass(LiMa to LAD-2014)     4. History of colon cancer status post sigmoid colon resection(10/2017) w/end colostomy, bladder dome resection and closure, segment of small bowel resected, with lung metastasis  -currently on chemotherapy(last dose on Tuesday)     5. Hyperlipidemia-continue simvastatin     6. GERD : Continue Protonix      7.  Chronic anemia : Continue ferrous sulfate 325 mg daily       The patient expressed appropriate understanding of and agreement with the discharge recommendations, medications, and plan.      Consults this admission:  IP CONSULT TO GENERAL SURGERY    Discharge Instruction:   Follow up appointments: Gen surg in 1 week  Primary care physician:  within 2 weeks    Diet:  cardiac diet   Activity: activity as tolerated  Disposition: Discharged to:   [x]Home, []HHC, []SNF, []Acute Rehab, []Hospice   Condition on discharge: Stable    Discharge Medications:      [facility] [place]   Home Medication Instructions PIU:[id number]    Printed on:20 110   Medication Information                      Apoaequorin (PREVAGEN PO)  Take 1 each by mouth daily              aspirin 81 MG tablet  Take 1 tablet by mouth daily             bismuth subsalicylate (PEPTO BISMOL) 262 MG/15ML suspension  Take 15 mLs by mouth every 6 hours as needed for Indigestion             Calcium Carbonate-Vitamin D (CALCIUM + D) 600-200 MG-UNIT TABS  Take 1 tablet by mouth every morning. Over The Counter             ferrous sulfate 325 (65 Fe) MG tablet  Take 1 tablet by mouth daily (with breakfast)             lisinopril (PRINIVIL;ZESTRIL) 20 MG tablet  Take 20 mg by mouth daily             Multiple Vitamins-Minerals (MULTIVITAMIN PO)  Take 1 each by mouth daily              Ostomy Supplies (OSTOMY DRAINABLE POUCH/FLANGE) MISC  1 device replaced twice weekly prn             pantoprazole (PROTONIX) 40 MG tablet  Take 1 tablet by mouth 2 times daily (before meals)             Potassium 99 MG TABS  Take 1 tablet by mouth daily             simvastatin (ZOCOR) 40 MG tablet  Take 40 mg by mouth nightly. Objective Findings at Discharge:   /67   Pulse 64   Temp 97.6 °F (36.4 °C) (Oral)   Resp 16   Ht 5' 5\" (1.651 m)   Wt 151 lb 1.6 oz (68.5 kg)   SpO2 98%   BMI 25.14 kg/m²            PHYSICAL EXAM   GEN Awake female, sitting upright in bed in no apparent distress. Appears given age. EYES Pupils are equally round. No scleral erythema, discharge, or conjunctivitis. HENT Mucous membranes are moist. Oral pharynx without exudates, no evidence of thrush. NECK Supple, no apparent thyromegaly or masses. RESP Clear to auscultation, no wheezes, rales or rhonchi. Symmetric chest movement while on room air. CARDIO/VASC S1/S2 auscultated. Regular rate without appreciable murmurs, rubs, or gallops. No JVD or carotid bruits. Peripheral pulses equal bilaterally and palpable. No peripheral edema. GI Abdomen is soft without significant tenderness, masses, or guarding. Bowel sounds are normoactive. Rectal exam deferred.  No costovertebral angle tenderness. Normal appearing external genitalia.  Gambino catheter is not present. HEME/LYMPH No palpable cervical lymphadenopathy and no hepatosplenomegaly. No petechiae or ecchymoses. MSK No gross joint deformities. SKIN Normal coloration, warm, dry. NEURO Cranial nerves appear grossly intact, normal speech, no lateralizing weakness. PSYCH Awake, alert, oriented x 4. Affect appropriate.     BMP/CBC  Recent Labs     02/24/20  1936 02/25/20  0433 02/25/20  1850 02/26/20  0800     --   --   --    K 3.8  --   --   --      --   --   --    CO2 22  --   --   --    BUN 17  --   --   --    CREATININE 1.1  --   --   --    WBC 4.4 2.6*  --  5.2   HCT 29.1* 20.7* 29.7* 29.4*    120*  --  158       IMAGING:  As above    Discharge Time of 35 minutes    Electronically signed by Los Demarco MD on 2/26/2020 at 11:02 AM

## 2020-02-26 NOTE — PLAN OF CARE
Problem: Discharge Planning:  Goal: Discharged to appropriate level of care  Description  Discharged to appropriate level of care  Outcome: Ongoing     Problem: Bowel Function - Altered:  Goal: Bowel elimination is within specified parameters  Description  Bowel elimination is within specified parameters  Outcome: Ongoing     Problem: Fluid Volume - Imbalance:  Goal: Will show no signs and symptoms of excessive bleeding  Description  Will show no signs and symptoms of excessive bleeding  Outcome: Ongoing  Goal: Absence of imbalanced fluid volume signs and symptoms  Description  Absence of imbalanced fluid volume signs and symptoms  Outcome: Ongoing     Problem: Nausea/Vomiting:  Goal: Absence of nausea/vomiting  Description  Absence of nausea/vomiting  Outcome: Ongoing  Goal: Able to drink  Description  Able to drink  Outcome: Ongoing  Goal: Able to eat  Description  Able to eat  Outcome: Ongoing  Goal: Ability to achieve adequate nutritional intake will improve  Description  Ability to achieve adequate nutritional intake will improve  Outcome: Ongoing     Problem: Physical Regulation:  Goal: Will remain free from infection  Description  Will remain free from infection  Outcome: Ongoing  Goal: Complications related to the disease process, condition or treatment will be avoided or minimized  Description  Complications related to the disease process, condition or treatment will be avoided or minimized  Outcome: Ongoing     Problem:  Activity:  Goal: Ability to tolerate increased activity will improve  Description  Ability to tolerate increased activity will improve  Outcome: Ongoing  Goal: Ability to implement measures to reduce episodes of fatigue will improve  Description  Ability to implement measures to reduce episodes of fatigue will improve  Outcome: Ongoing     Problem: Coping:  Goal: Ability to cope will improve  Description  Ability to cope will improve  Outcome: Ongoing     Problem: Health Behavior:  Goal: Adoption of positive health habits will improve  Description  Adoption of positive health habits will improve  Outcome: Ongoing  Goal: Identification of resources available to assist in meeting health care needs will improve  Description  Identification of resources available to assist in meeting health care needs will improve  Outcome: Ongoing  Goal: Ability to verbalize follow-up procedures will improve  Description  Ability to verbalize follow-up procedures will improve  Outcome: Ongoing     Problem: Nutritional:  Goal: Nutritional status will improve  Description  Nutritional status will improve  Outcome: Ongoing     Problem: Safety:  Goal: Ability to remain free from injury will improve  Description  Ability to remain free from injury will improve  Outcome: Ongoing     Problem: Sensory:  Goal: Ability to develop a pain control plan will improve  Description  Ability to develop a pain control plan will improve  Outcome: Ongoing     Problem: Falls - Risk of:  Goal: Will remain free from falls  Description  Will remain free from falls  Outcome: Ongoing  Goal: Absence of physical injury  Description  Absence of physical injury  Outcome: Ongoing

## 2020-02-27 LAB
ABO/RH: NORMAL
ANTIBODY SCREEN: NEGATIVE
COMMENT: NORMAL
COMPONENT: NORMAL
COMPONENT: NORMAL
CROSSMATCH RESULT: NORMAL
CROSSMATCH RESULT: NORMAL
STATUS: NORMAL
STATUS: NORMAL
TRANSFUSION STATUS: NORMAL
TRANSFUSION STATUS: NORMAL
UNIT DIVISION: 0
UNIT DIVISION: 0
UNIT NUMBER: NORMAL
UNIT NUMBER: NORMAL

## 2020-03-03 ENCOUNTER — HOSPITAL ENCOUNTER (OUTPATIENT)
Dept: INFUSION THERAPY | Age: 75
Discharge: HOME | End: 2020-03-03
Payer: MEDICARE

## 2020-03-03 LAB
ALBUMIN SERPL-MCNC: 3.7 GM/DL (ref 3.4–5)
ALP BLD-CCNC: 185 IU/L (ref 40–128)
ALT SERPL-CCNC: 37 U/L (ref 10–40)
ANION GAP SERPL CALCULATED.3IONS-SCNC: 11 MMOL/L (ref 4–16)
AST SERPL-CCNC: 53 IU/L (ref 15–37)
BASOPHILS ABSOLUTE: 0 K/CU MM
BASOPHILS RELATIVE PERCENT: 1.3 % (ref 0–1)
BILIRUB SERPL-MCNC: 0.3 MG/DL (ref 0–1)
BUN BLDV-MCNC: 11 MG/DL (ref 6–23)
CALCIUM SERPL-MCNC: 9.3 MG/DL (ref 8.3–10.6)
CEA: 6.4 NG/ML
CHLORIDE BLD-SCNC: 110 MMOL/L (ref 99–110)
CO2: 25 MMOL/L (ref 21–32)
CREAT SERPL-MCNC: 1.1 MG/DL (ref 0.6–1.1)
DIFFERENTIAL TYPE: ABNORMAL
EOSINOPHILS ABSOLUTE: 0.1 K/CU MM
EOSINOPHILS RELATIVE PERCENT: 1.6 % (ref 0–3)
GFR AFRICAN AMERICAN: 59 ML/MIN/1.73M2
GFR NON-AFRICAN AMERICAN: 49 ML/MIN/1.73M2
GLUCOSE BLD-MCNC: 115 MG/DL (ref 70–99)
HCT VFR BLD CALC: 29.5 % (ref 37–47)
HEMOGLOBIN: 9.1 GM/DL (ref 12.5–16)
LYMPHOCYTES ABSOLUTE: 1.1 K/CU MM
LYMPHOCYTES RELATIVE PERCENT: 34.5 % (ref 24–44)
MCH RBC QN AUTO: 27 PG (ref 27–31)
MCHC RBC AUTO-ENTMCNC: 30.8 % (ref 32–36)
MCV RBC AUTO: 87.5 FL (ref 78–100)
MONOCYTES ABSOLUTE: 0.6 K/CU MM
MONOCYTES RELATIVE PERCENT: 18.4 % (ref 0–4)
PDW BLD-RTO: 18.7 % (ref 11.7–14.9)
PLATELET # BLD: 128 K/CU MM (ref 140–440)
PMV BLD AUTO: 10.6 FL (ref 7.5–11.1)
POTASSIUM SERPL-SCNC: 3.5 MMOL/L (ref 3.5–5.1)
RBC # BLD: 3.37 M/CU MM (ref 4.2–5.4)
SEGMENTED NEUTROPHILS ABSOLUTE COUNT: 1.3 K/CU MM
SEGMENTED NEUTROPHILS RELATIVE PERCENT: 44.2 % (ref 36–66)
SODIUM BLD-SCNC: 146 MMOL/L (ref 135–145)
TOTAL PROTEIN: 6.3 GM/DL (ref 6.4–8.2)
WBC # BLD: 3 K/CU MM (ref 4–10.5)

## 2020-03-03 PROCEDURE — 80053 COMPREHEN METABOLIC PANEL: CPT

## 2020-03-03 PROCEDURE — 85025 COMPLETE CBC W/AUTO DIFF WBC: CPT

## 2020-03-03 PROCEDURE — 36415 COLL VENOUS BLD VENIPUNCTURE: CPT

## 2020-03-03 PROCEDURE — 82378 CARCINOEMBRYONIC ANTIGEN: CPT

## 2020-03-03 RX ORDER — PALONOSETRON 0.05 MG/ML
0.25 INJECTION, SOLUTION INTRAVENOUS ONCE
Status: CANCELLED | OUTPATIENT
Start: 2020-03-04

## 2020-03-03 RX ORDER — DEXAMETHASONE SODIUM PHOSPHATE 10 MG/ML
10 INJECTION, SOLUTION INTRAMUSCULAR; INTRAVENOUS ONCE
Status: CANCELLED | OUTPATIENT
Start: 2020-03-04

## 2020-03-04 ENCOUNTER — HOSPITAL ENCOUNTER (OUTPATIENT)
Dept: INFUSION THERAPY | Age: 75
Discharge: HOME | End: 2020-03-04
Payer: MEDICARE

## 2020-03-12 ENCOUNTER — OFFICE VISIT (OUTPATIENT)
Dept: PULMONOLOGY | Age: 75
End: 2020-03-12
Payer: MEDICARE

## 2020-03-12 VITALS
WEIGHT: 148 LBS | HEART RATE: 81 BPM | HEIGHT: 65 IN | DIASTOLIC BLOOD PRESSURE: 76 MMHG | SYSTOLIC BLOOD PRESSURE: 128 MMHG | BODY MASS INDEX: 24.66 KG/M2 | OXYGEN SATURATION: 98 %

## 2020-03-12 PROBLEM — C18.9 COLON CANCER METASTASIZED TO LUNG (HCC): Status: ACTIVE | Noted: 2020-03-12

## 2020-03-12 PROBLEM — C78.00 COLON CANCER METASTASIZED TO LUNG (HCC): Status: ACTIVE | Noted: 2020-03-12

## 2020-03-12 PROCEDURE — 1111F DSCHRG MED/CURRENT MED MERGE: CPT | Performed by: INTERNAL MEDICINE

## 2020-03-12 PROCEDURE — 1090F PRES/ABSN URINE INCON ASSESS: CPT | Performed by: INTERNAL MEDICINE

## 2020-03-12 PROCEDURE — 1123F ACP DISCUSS/DSCN MKR DOCD: CPT | Performed by: INTERNAL MEDICINE

## 2020-03-12 PROCEDURE — 1036F TOBACCO NON-USER: CPT | Performed by: INTERNAL MEDICINE

## 2020-03-12 PROCEDURE — G8400 PT W/DXA NO RESULTS DOC: HCPCS | Performed by: INTERNAL MEDICINE

## 2020-03-12 PROCEDURE — G8926 SPIRO NO PERF OR DOC: HCPCS | Performed by: INTERNAL MEDICINE

## 2020-03-12 PROCEDURE — 99213 OFFICE O/P EST LOW 20 MIN: CPT | Performed by: INTERNAL MEDICINE

## 2020-03-12 PROCEDURE — G8427 DOCREV CUR MEDS BY ELIG CLIN: HCPCS | Performed by: INTERNAL MEDICINE

## 2020-03-12 PROCEDURE — G8420 CALC BMI NORM PARAMETERS: HCPCS | Performed by: INTERNAL MEDICINE

## 2020-03-12 PROCEDURE — 3023F SPIROM DOC REV: CPT | Performed by: INTERNAL MEDICINE

## 2020-03-12 PROCEDURE — 3017F COLORECTAL CA SCREEN DOC REV: CPT | Performed by: INTERNAL MEDICINE

## 2020-03-12 PROCEDURE — G8482 FLU IMMUNIZE ORDER/ADMIN: HCPCS | Performed by: INTERNAL MEDICINE

## 2020-03-12 PROCEDURE — 4040F PNEUMOC VAC/ADMIN/RCVD: CPT | Performed by: INTERNAL MEDICINE

## 2020-03-12 RX ORDER — ALBUTEROL SULFATE 90 UG/1
2 POWDER, METERED RESPIRATORY (INHALATION) EVERY 4 HOURS PRN
Qty: 1 INHALER | Refills: 11 | Status: SHIPPED | OUTPATIENT
Start: 2020-03-12 | End: 2020-08-11

## 2020-03-12 NOTE — PROGRESS NOTES
on the pulmonary nodule. I will continue to follow her  Return in about 6 months (around 9/12/2020) for Recheck for COPD, recheck for lung nodule. This dictation was performed with a verbal recognition program and it was checked for errors. It is possible that there are still dictated errors within this office note. Any errors should be brought immediately to my attention for correction. All efforts were made to ensure that this office note is accurate.

## 2020-03-17 ENCOUNTER — HOSPITAL ENCOUNTER (OUTPATIENT)
Dept: INFUSION THERAPY | Age: 75
Discharge: HOME | End: 2020-03-17
Payer: MEDICARE

## 2020-03-17 LAB
ALBUMIN SERPL-MCNC: 3.6 GM/DL (ref 3.4–5)
ALP BLD-CCNC: 301 IU/L (ref 40–128)
ALT SERPL-CCNC: 20 U/L (ref 10–40)
ANION GAP SERPL CALCULATED.3IONS-SCNC: 14 MMOL/L (ref 4–16)
AST SERPL-CCNC: 37 IU/L (ref 15–37)
BASOPHILS ABSOLUTE: 0 K/CU MM
BASOPHILS RELATIVE PERCENT: 0.6 % (ref 0–1)
BILIRUB SERPL-MCNC: 0.6 MG/DL (ref 0–1)
BUN BLDV-MCNC: 12 MG/DL (ref 6–23)
CALCIUM SERPL-MCNC: 8.9 MG/DL (ref 8.3–10.6)
CEA: 36.9 NG/ML
CHLORIDE BLD-SCNC: 103 MMOL/L (ref 99–110)
CO2: 24 MMOL/L (ref 21–32)
CREAT SERPL-MCNC: 1.1 MG/DL (ref 0.6–1.1)
DIFFERENTIAL TYPE: ABNORMAL
EOSINOPHILS ABSOLUTE: 0.2 K/CU MM
EOSINOPHILS RELATIVE PERCENT: 2.4 % (ref 0–3)
GFR AFRICAN AMERICAN: 59 ML/MIN/1.73M2
GFR NON-AFRICAN AMERICAN: 49 ML/MIN/1.73M2
GLUCOSE BLD-MCNC: 127 MG/DL (ref 70–99)
HCT VFR BLD CALC: 33.2 % (ref 37–47)
HEMOGLOBIN: 10.2 GM/DL (ref 12.5–16)
LYMPHOCYTES ABSOLUTE: 1 K/CU MM
LYMPHOCYTES RELATIVE PERCENT: 13.7 % (ref 24–44)
MCH RBC QN AUTO: 27.3 PG (ref 27–31)
MCHC RBC AUTO-ENTMCNC: 30.7 % (ref 32–36)
MCV RBC AUTO: 88.8 FL (ref 78–100)
MONOCYTES ABSOLUTE: 1.2 K/CU MM
MONOCYTES RELATIVE PERCENT: 16.9 % (ref 0–4)
PDW BLD-RTO: 18.6 % (ref 11.7–14.9)
PLATELET # BLD: 195 K/CU MM (ref 140–440)
PMV BLD AUTO: 11.8 FL (ref 7.5–11.1)
POTASSIUM SERPL-SCNC: 3.7 MMOL/L (ref 3.5–5.1)
RBC # BLD: 3.74 M/CU MM (ref 4.2–5.4)
SEGMENTED NEUTROPHILS ABSOLUTE COUNT: 4.7 K/CU MM
SEGMENTED NEUTROPHILS RELATIVE PERCENT: 66.4 % (ref 36–66)
SODIUM BLD-SCNC: 141 MMOL/L (ref 135–145)
TOTAL PROTEIN: 6.8 GM/DL (ref 6.4–8.2)
WBC # BLD: 7.1 K/CU MM (ref 4–10.5)

## 2020-03-17 PROCEDURE — 85025 COMPLETE CBC W/AUTO DIFF WBC: CPT

## 2020-03-17 PROCEDURE — 99211 OFF/OP EST MAY X REQ PHY/QHP: CPT

## 2020-03-17 PROCEDURE — 36415 COLL VENOUS BLD VENIPUNCTURE: CPT

## 2020-03-17 PROCEDURE — 82378 CARCINOEMBRYONIC ANTIGEN: CPT

## 2020-03-17 PROCEDURE — 80053 COMPREHEN METABOLIC PANEL: CPT

## 2020-03-18 ENCOUNTER — HOSPITAL ENCOUNTER (OUTPATIENT)
Dept: INFUSION THERAPY | Age: 75
Discharge: HOME | End: 2020-03-18
Payer: MEDICARE

## 2020-03-18 VITALS — WEIGHT: 146.2 LBS | HEIGHT: 65 IN | BODY MASS INDEX: 24.36 KG/M2

## 2020-03-18 PROCEDURE — 2580000003 HC RX 258: Performed by: INTERNAL MEDICINE

## 2020-03-18 PROCEDURE — 96375 TX/PRO/DX INJ NEW DRUG ADDON: CPT

## 2020-03-18 PROCEDURE — 96413 CHEMO IV INFUSION 1 HR: CPT

## 2020-03-18 PROCEDURE — 6360000002 HC RX W HCPCS

## 2020-03-18 PROCEDURE — 96415 CHEMO IV INFUSION ADDL HR: CPT

## 2020-03-18 PROCEDURE — 6360000002 HC RX W HCPCS: Performed by: INTERNAL MEDICINE

## 2020-03-18 PROCEDURE — 96417 CHEMO IV INFUS EACH ADDL SEQ: CPT

## 2020-03-18 PROCEDURE — 96368 THER/DIAG CONCURRENT INF: CPT

## 2020-03-18 RX ORDER — PALONOSETRON 0.05 MG/ML
INJECTION, SOLUTION INTRAVENOUS
Status: DISPENSED
Start: 2020-03-18 | End: 2020-03-18

## 2020-03-18 RX ORDER — PALONOSETRON 0.05 MG/ML
0.25 INJECTION, SOLUTION INTRAVENOUS ONCE
Status: DISCONTINUED | OUTPATIENT
Start: 2020-03-18 | End: 2020-03-19 | Stop reason: HOSPADM

## 2020-03-18 RX ORDER — DEXAMETHASONE SODIUM PHOSPHATE 10 MG/ML
10 INJECTION, SOLUTION INTRAMUSCULAR; INTRAVENOUS ONCE
Status: DISCONTINUED | OUTPATIENT
Start: 2020-03-18 | End: 2020-03-19 | Stop reason: HOSPADM

## 2020-03-18 RX ORDER — DEXAMETHASONE SODIUM PHOSPHATE 10 MG/ML
INJECTION, SOLUTION INTRAMUSCULAR; INTRAVENOUS
Status: DISPENSED
Start: 2020-03-18 | End: 2020-03-18

## 2020-03-20 ENCOUNTER — HOSPITAL ENCOUNTER (OUTPATIENT)
Dept: INFUSION THERAPY | Age: 75
Discharge: HOME | End: 2020-03-20
Payer: MEDICARE

## 2020-03-20 PROCEDURE — 96523 IRRIG DRUG DELIVERY DEVICE: CPT

## 2020-03-20 PROCEDURE — 6360000002 HC RX W HCPCS

## 2020-03-20 RX ORDER — HEPARIN SODIUM (PORCINE) LOCK FLUSH IV SOLN 100 UNIT/ML 100 UNIT/ML
SOLUTION INTRAVENOUS
Status: DISCONTINUED
Start: 2020-03-20 | End: 2020-03-21 | Stop reason: HOSPADM

## 2020-03-31 ENCOUNTER — HOSPITAL ENCOUNTER (OUTPATIENT)
Dept: INFUSION THERAPY | Age: 75
Discharge: HOME | End: 2020-03-31
Payer: MEDICARE

## 2020-03-31 LAB
ALBUMIN SERPL-MCNC: 3.7 GM/DL (ref 3.4–5)
ALP BLD-CCNC: 309 IU/L (ref 40–128)
ALT SERPL-CCNC: 33 U/L (ref 10–40)
ANION GAP SERPL CALCULATED.3IONS-SCNC: 12 MMOL/L (ref 4–16)
AST SERPL-CCNC: 54 IU/L (ref 15–37)
BASOPHILS ABSOLUTE: 0 K/CU MM
BASOPHILS RELATIVE PERCENT: 0.5 % (ref 0–1)
BILIRUB SERPL-MCNC: 0.2 MG/DL (ref 0–1)
BUN BLDV-MCNC: 12 MG/DL (ref 6–23)
CALCIUM SERPL-MCNC: 9.6 MG/DL (ref 8.3–10.6)
CEA: 19.9 NG/ML
CHLORIDE BLD-SCNC: 107 MMOL/L (ref 99–110)
CO2: 25 MMOL/L (ref 21–32)
CREAT SERPL-MCNC: 1.1 MG/DL (ref 0.6–1.1)
DIFFERENTIAL TYPE: ABNORMAL
EOSINOPHILS ABSOLUTE: 0.1 K/CU MM
EOSINOPHILS RELATIVE PERCENT: 2.6 % (ref 0–3)
GFR AFRICAN AMERICAN: 57 ML/MIN/1.73M2
GFR AFRICAN AMERICAN: 59 ML/MIN/1.73M2
GFR NON-AFRICAN AMERICAN: 47 ML/MIN/1.73M2
GFR NON-AFRICAN AMERICAN: 49 ML/MIN/1.73M2
GLUCOSE BLD-MCNC: 145 MG/DL (ref 70–99)
GLUCOSE BLD-MCNC: 156 MG/DL (ref 70–99)
HCT VFR BLD CALC: 32.1 % (ref 37–47)
HEMOGLOBIN: 9.9 GM/DL (ref 12.5–16)
LYMPHOCYTES ABSOLUTE: 1.2 K/CU MM
LYMPHOCYTES RELATIVE PERCENT: 31 % (ref 24–44)
MCH RBC QN AUTO: 27.7 PG (ref 27–31)
MCHC RBC AUTO-ENTMCNC: 30.8 % (ref 32–36)
MCV RBC AUTO: 89.9 FL (ref 78–100)
MONOCYTES ABSOLUTE: 0.6 K/CU MM
MONOCYTES RELATIVE PERCENT: 15 % (ref 0–4)
PDW BLD-RTO: 17.4 % (ref 11.7–14.9)
PLATELET # BLD: 184 K/CU MM (ref 140–440)
PMV BLD AUTO: 10.3 FL (ref 7.5–11.1)
POC CALCIUM: 1.29 MMOL/L (ref 1.12–1.32)
POC CHLORIDE: 107 MMOL/L (ref 98–109)
POC CREATININE: 1.1 MG/DL (ref 0.6–1.1)
POTASSIUM SERPL-SCNC: 3.5 MMOL/L (ref 3.6–5.1)
POTASSIUM SERPL-SCNC: 3.9 MMOL/L (ref 3.5–5.1)
RBC # BLD: 3.57 M/CU MM (ref 4.2–5.4)
SEGMENTED NEUTROPHILS ABSOLUTE COUNT: 2 K/CU MM
SEGMENTED NEUTROPHILS RELATIVE PERCENT: 50.9 % (ref 36–66)
SODIUM BLD-SCNC: 144 MMOL/L (ref 135–145)
SODIUM BLD-SCNC: 149 MMOL/L (ref 136–145)
TOTAL PROTEIN: 6.2 GM/DL (ref 6.4–8.2)
WBC # BLD: 3.9 K/CU MM (ref 4–10.5)

## 2020-03-31 PROCEDURE — 80053 COMPREHEN METABOLIC PANEL: CPT

## 2020-03-31 PROCEDURE — 82378 CARCINOEMBRYONIC ANTIGEN: CPT

## 2020-03-31 PROCEDURE — 85025 COMPLETE CBC W/AUTO DIFF WBC: CPT

## 2020-03-31 PROCEDURE — 36415 COLL VENOUS BLD VENIPUNCTURE: CPT

## 2020-04-01 ENCOUNTER — HOSPITAL ENCOUNTER (OUTPATIENT)
Dept: INFUSION THERAPY | Age: 75
Discharge: HOME | End: 2020-04-01
Payer: MEDICARE

## 2020-04-01 PROCEDURE — 2580000003 HC RX 258: Performed by: INTERNAL MEDICINE

## 2020-04-01 PROCEDURE — 96368 THER/DIAG CONCURRENT INF: CPT

## 2020-04-01 PROCEDURE — 96375 TX/PRO/DX INJ NEW DRUG ADDON: CPT

## 2020-04-01 PROCEDURE — 96413 CHEMO IV INFUSION 1 HR: CPT

## 2020-04-01 PROCEDURE — 6360000002 HC RX W HCPCS: Performed by: INTERNAL MEDICINE

## 2020-04-01 PROCEDURE — 96411 CHEMO IV PUSH ADDL DRUG: CPT

## 2020-04-01 PROCEDURE — 96415 CHEMO IV INFUSION ADDL HR: CPT

## 2020-04-01 PROCEDURE — 6360000002 HC RX W HCPCS

## 2020-04-01 PROCEDURE — 96417 CHEMO IV INFUS EACH ADDL SEQ: CPT

## 2020-04-01 RX ORDER — DEXAMETHASONE SODIUM PHOSPHATE 10 MG/ML
INJECTION, SOLUTION INTRAMUSCULAR; INTRAVENOUS
Status: DISPENSED
Start: 2020-04-01 | End: 2020-04-01

## 2020-04-01 RX ORDER — PALONOSETRON 0.05 MG/ML
INJECTION, SOLUTION INTRAVENOUS
Status: DISPENSED
Start: 2020-04-01 | End: 2020-04-01

## 2020-04-01 RX ORDER — PALONOSETRON 0.05 MG/ML
0.25 INJECTION, SOLUTION INTRAVENOUS ONCE
Status: DISCONTINUED | OUTPATIENT
Start: 2020-04-01 | End: 2020-04-02 | Stop reason: HOSPADM

## 2020-04-03 ENCOUNTER — HOSPITAL ENCOUNTER (OUTPATIENT)
Dept: INFUSION THERAPY | Age: 75
Discharge: HOME | End: 2020-04-03
Payer: MEDICARE

## 2020-04-03 PROCEDURE — 96523 IRRIG DRUG DELIVERY DEVICE: CPT

## 2020-04-14 ENCOUNTER — HOSPITAL ENCOUNTER (OUTPATIENT)
Dept: INFUSION THERAPY | Age: 75
Discharge: HOME | End: 2020-04-14
Payer: MEDICARE

## 2020-04-14 LAB
ALBUMIN SERPL-MCNC: 3.8 GM/DL (ref 3.4–5)
ALP BLD-CCNC: 288 IU/L (ref 40–128)
ALT SERPL-CCNC: 37 U/L (ref 10–40)
ANION GAP SERPL CALCULATED.3IONS-SCNC: 13 MMOL/L (ref 4–16)
AST SERPL-CCNC: 56 IU/L (ref 15–37)
BASOPHILS ABSOLUTE: 0 K/CU MM
BASOPHILS RELATIVE PERCENT: 1.2 % (ref 0–1)
BILIRUB SERPL-MCNC: 0.3 MG/DL (ref 0–1)
BUN BLDV-MCNC: 11 MG/DL (ref 6–23)
CALCIUM SERPL-MCNC: 9.3 MG/DL (ref 8.3–10.6)
CEA: 14.8 NG/ML
CHLORIDE BLD-SCNC: 106 MMOL/L (ref 99–110)
CO2: 24 MMOL/L (ref 21–32)
CREAT SERPL-MCNC: 1 MG/DL (ref 0.6–1.1)
DIFFERENTIAL TYPE: ABNORMAL
EOSINOPHILS ABSOLUTE: 0.1 K/CU MM
EOSINOPHILS RELATIVE PERCENT: 2.5 % (ref 0–3)
GFR AFRICAN AMERICAN: >60 ML/MIN/1.73M2
GFR NON-AFRICAN AMERICAN: 54 ML/MIN/1.73M2
GLUCOSE BLD-MCNC: 117 MG/DL (ref 70–99)
HCT VFR BLD CALC: 33.1 % (ref 37–47)
HEMOGLOBIN: 10.4 GM/DL (ref 12.5–16)
LYMPHOCYTES ABSOLUTE: 1.1 K/CU MM
LYMPHOCYTES RELATIVE PERCENT: 34.1 % (ref 24–44)
MCH RBC QN AUTO: 27.9 PG (ref 27–31)
MCHC RBC AUTO-ENTMCNC: 31.4 % (ref 32–36)
MCV RBC AUTO: 88.7 FL (ref 78–100)
MONOCYTES ABSOLUTE: 0.6 K/CU MM
MONOCYTES RELATIVE PERCENT: 17.6 % (ref 0–4)
PDW BLD-RTO: 17.3 % (ref 11.7–14.9)
PLATELET # BLD: 135 K/CU MM (ref 140–440)
PMV BLD AUTO: 10.4 FL (ref 7.5–11.1)
POTASSIUM SERPL-SCNC: 3.8 MMOL/L (ref 3.5–5.1)
RBC # BLD: 3.73 M/CU MM (ref 4.2–5.4)
SEGMENTED NEUTROPHILS ABSOLUTE COUNT: 1.4 K/CU MM
SEGMENTED NEUTROPHILS RELATIVE PERCENT: 44.6 % (ref 36–66)
SODIUM BLD-SCNC: 143 MMOL/L (ref 135–145)
TOTAL PROTEIN: 6.5 GM/DL (ref 6.4–8.2)
WBC # BLD: 3.2 K/CU MM (ref 4–10.5)

## 2020-04-14 PROCEDURE — 85025 COMPLETE CBC W/AUTO DIFF WBC: CPT

## 2020-04-14 PROCEDURE — 99211 OFF/OP EST MAY X REQ PHY/QHP: CPT

## 2020-04-14 PROCEDURE — 80053 COMPREHEN METABOLIC PANEL: CPT

## 2020-04-14 PROCEDURE — 36415 COLL VENOUS BLD VENIPUNCTURE: CPT

## 2020-04-14 PROCEDURE — 82378 CARCINOEMBRYONIC ANTIGEN: CPT

## 2020-04-21 ENCOUNTER — HOSPITAL ENCOUNTER (OUTPATIENT)
Dept: INFUSION THERAPY | Age: 75
Discharge: HOME | End: 2020-04-21
Payer: MEDICARE

## 2020-04-21 LAB
ALBUMIN SERPL-MCNC: 3.6 GM/DL (ref 3.4–5)
ALP BLD-CCNC: 262 IU/L (ref 40–129)
ALT SERPL-CCNC: 28 U/L (ref 10–40)
ANION GAP SERPL CALCULATED.3IONS-SCNC: 10 MMOL/L (ref 4–16)
AST SERPL-CCNC: 42 IU/L (ref 15–37)
BASOPHILS ABSOLUTE: 0 K/CU MM
BASOPHILS RELATIVE PERCENT: 0.9 % (ref 0–1)
BILIRUB SERPL-MCNC: 0.4 MG/DL (ref 0–1)
BUN BLDV-MCNC: 9 MG/DL (ref 6–23)
CALCIUM SERPL-MCNC: 9.1 MG/DL (ref 8.3–10.6)
CHLORIDE BLD-SCNC: 106 MMOL/L (ref 99–110)
CO2: 28 MMOL/L (ref 21–32)
CREAT SERPL-MCNC: 1 MG/DL (ref 0.6–1.1)
DIFFERENTIAL TYPE: ABNORMAL
EOSINOPHILS ABSOLUTE: 0.1 K/CU MM
EOSINOPHILS RELATIVE PERCENT: 2.3 % (ref 0–3)
GFR AFRICAN AMERICAN: >60 ML/MIN/1.73M2
GFR NON-AFRICAN AMERICAN: 54 ML/MIN/1.73M2
GLUCOSE BLD-MCNC: 91 MG/DL (ref 70–99)
HCT VFR BLD CALC: 32.5 % (ref 37–47)
HEMOGLOBIN: 10 GM/DL (ref 12.5–16)
LYMPHOCYTES ABSOLUTE: 1.1 K/CU MM
LYMPHOCYTES RELATIVE PERCENT: 31.2 % (ref 24–44)
MCH RBC QN AUTO: 28.1 PG (ref 27–31)
MCHC RBC AUTO-ENTMCNC: 30.8 % (ref 32–36)
MCV RBC AUTO: 91.3 FL (ref 78–100)
MONOCYTES ABSOLUTE: 0.9 K/CU MM
MONOCYTES RELATIVE PERCENT: 25.8 % (ref 0–4)
PDW BLD-RTO: 17.5 % (ref 11.7–14.9)
PLATELET # BLD: 131 K/CU MM (ref 140–440)
PMV BLD AUTO: 10.3 FL (ref 7.5–11.1)
POTASSIUM SERPL-SCNC: 3.9 MMOL/L (ref 3.5–5.1)
RBC # BLD: 3.56 M/CU MM (ref 4.2–5.4)
SEGMENTED NEUTROPHILS ABSOLUTE COUNT: 1.4 K/CU MM
SEGMENTED NEUTROPHILS RELATIVE PERCENT: 39.8 % (ref 36–66)
SODIUM BLD-SCNC: 144 MMOL/L (ref 135–145)
TOTAL PROTEIN: 6.3 GM/DL (ref 6.4–8.2)
WBC # BLD: 3.5 K/CU MM (ref 4–10.5)

## 2020-04-21 PROCEDURE — 80053 COMPREHEN METABOLIC PANEL: CPT

## 2020-04-21 PROCEDURE — 85025 COMPLETE CBC W/AUTO DIFF WBC: CPT

## 2020-04-21 PROCEDURE — 36415 COLL VENOUS BLD VENIPUNCTURE: CPT

## 2020-04-22 ENCOUNTER — HOSPITAL ENCOUNTER (OUTPATIENT)
Dept: INFUSION THERAPY | Age: 75
Discharge: HOME | End: 2020-04-22
Payer: MEDICARE

## 2020-04-22 PROCEDURE — 96417 CHEMO IV INFUS EACH ADDL SEQ: CPT

## 2020-04-22 PROCEDURE — 96368 THER/DIAG CONCURRENT INF: CPT

## 2020-04-22 PROCEDURE — 6360000002 HC RX W HCPCS: Performed by: INTERNAL MEDICINE

## 2020-04-22 PROCEDURE — 6360000002 HC RX W HCPCS

## 2020-04-22 PROCEDURE — 96415 CHEMO IV INFUSION ADDL HR: CPT

## 2020-04-22 PROCEDURE — 2580000003 HC RX 258: Performed by: INTERNAL MEDICINE

## 2020-04-22 PROCEDURE — 96375 TX/PRO/DX INJ NEW DRUG ADDON: CPT

## 2020-04-22 PROCEDURE — 96413 CHEMO IV INFUSION 1 HR: CPT

## 2020-04-22 RX ORDER — PALONOSETRON 0.05 MG/ML
0.25 INJECTION, SOLUTION INTRAVENOUS ONCE
Status: DISCONTINUED | OUTPATIENT
Start: 2020-04-22 | End: 2020-04-23 | Stop reason: HOSPADM

## 2020-04-22 RX ORDER — PALONOSETRON 0.05 MG/ML
INJECTION, SOLUTION INTRAVENOUS
Status: DISPENSED
Start: 2020-04-22 | End: 2020-04-22

## 2020-04-24 ENCOUNTER — HOSPITAL ENCOUNTER (OUTPATIENT)
Dept: INFUSION THERAPY | Age: 75
Discharge: HOME | End: 2020-04-24
Payer: MEDICARE

## 2020-04-24 PROCEDURE — 96523 IRRIG DRUG DELIVERY DEVICE: CPT

## 2020-04-24 PROCEDURE — 6360000002 HC RX W HCPCS

## 2020-04-24 RX ORDER — HEPARIN SODIUM (PORCINE) LOCK FLUSH IV SOLN 100 UNIT/ML 100 UNIT/ML
SOLUTION INTRAVENOUS
Status: DISPENSED
Start: 2020-04-24 | End: 2020-04-24

## 2020-04-24 RX ORDER — HEPARIN SODIUM (PORCINE) LOCK FLUSH IV SOLN 100 UNIT/ML 100 UNIT/ML
SOLUTION INTRAVENOUS
Status: DISCONTINUED
Start: 2020-04-24 | End: 2020-04-24 | Stop reason: WASHOUT

## 2020-05-05 ENCOUNTER — HOSPITAL ENCOUNTER (OUTPATIENT)
Dept: INFUSION THERAPY | Age: 75
Discharge: HOME | End: 2020-05-05
Payer: MEDICARE

## 2020-05-05 LAB
ALBUMIN SERPL-MCNC: 3.6 GM/DL (ref 3.4–5)
ALP BLD-CCNC: 301 IU/L (ref 40–128)
ALT SERPL-CCNC: 33 U/L (ref 10–40)
ANION GAP SERPL CALCULATED.3IONS-SCNC: 9 MMOL/L (ref 4–16)
AST SERPL-CCNC: 52 IU/L (ref 15–37)
BASOPHILS ABSOLUTE: 0 K/CU MM
BASOPHILS RELATIVE PERCENT: 0.5 % (ref 0–1)
BILIRUB SERPL-MCNC: 0.3 MG/DL (ref 0–1)
BUN BLDV-MCNC: 9 MG/DL (ref 6–23)
CALCIUM SERPL-MCNC: 9.4 MG/DL (ref 8.3–10.6)
CHLORIDE BLD-SCNC: 105 MMOL/L (ref 99–110)
CO2: 26 MMOL/L (ref 21–32)
CREAT SERPL-MCNC: 0.9 MG/DL (ref 0.6–1.1)
DIFFERENTIAL TYPE: ABNORMAL
EOSINOPHILS ABSOLUTE: 0.1 K/CU MM
EOSINOPHILS RELATIVE PERCENT: 2.7 % (ref 0–3)
GFR AFRICAN AMERICAN: >60 ML/MIN/1.73M2
GFR AFRICAN AMERICAN: >60 ML/MIN/1.73M2
GFR NON-AFRICAN AMERICAN: 51 ML/MIN/1.73M2
GFR NON-AFRICAN AMERICAN: >60 ML/MIN/1.73M2
GLUCOSE BLD-MCNC: 118 MG/DL (ref 70–99)
GLUCOSE BLD-MCNC: 131 MG/DL (ref 70–99)
HCT VFR BLD CALC: 34.2 % (ref 37–47)
HEMOGLOBIN: 10.9 GM/DL (ref 12.5–16)
LYMPHOCYTES ABSOLUTE: 1.1 K/CU MM
LYMPHOCYTES RELATIVE PERCENT: 30.2 % (ref 24–44)
MCH RBC QN AUTO: 28.8 PG (ref 27–31)
MCHC RBC AUTO-ENTMCNC: 31.9 % (ref 32–36)
MCV RBC AUTO: 90.2 FL (ref 78–100)
MONOCYTES ABSOLUTE: 0.7 K/CU MM
MONOCYTES RELATIVE PERCENT: 17.7 % (ref 0–4)
PDW BLD-RTO: 16.1 % (ref 11.7–14.9)
PLATELET # BLD: 110 K/CU MM (ref 140–440)
PMV BLD AUTO: 9.9 FL (ref 7.5–11.1)
POC CALCIUM: 1.2 MMOL/L (ref 1.12–1.32)
POC CHLORIDE: 106 MMOL/L (ref 98–109)
POC CREATININE: 1.1 MG/DL (ref 0.6–1.1)
POTASSIUM SERPL-SCNC: 3.6 MMOL/L (ref 3.6–5.1)
POTASSIUM SERPL-SCNC: 3.8 MMOL/L (ref 3.5–5.1)
RBC # BLD: 3.79 M/CU MM (ref 4.2–5.4)
SEGMENTED NEUTROPHILS ABSOLUTE COUNT: 1.8 K/CU MM
SEGMENTED NEUTROPHILS RELATIVE PERCENT: 48.9 % (ref 36–66)
SODIUM BLD-SCNC: 140 MMOL/L (ref 135–145)
SODIUM BLD-SCNC: 146 MMOL/L (ref 136–145)
TOTAL PROTEIN: 6.4 GM/DL (ref 6.4–8.2)
WBC # BLD: 3.7 K/CU MM (ref 4–10.5)

## 2020-05-05 PROCEDURE — 85025 COMPLETE CBC W/AUTO DIFF WBC: CPT

## 2020-05-05 PROCEDURE — 80053 COMPREHEN METABOLIC PANEL: CPT

## 2020-05-05 PROCEDURE — 36415 COLL VENOUS BLD VENIPUNCTURE: CPT

## 2020-05-06 ENCOUNTER — HOSPITAL ENCOUNTER (OUTPATIENT)
Dept: INFUSION THERAPY | Age: 75
Discharge: HOME | End: 2020-05-06
Payer: MEDICARE

## 2020-05-06 PROCEDURE — 6360000002 HC RX W HCPCS

## 2020-05-06 PROCEDURE — 96368 THER/DIAG CONCURRENT INF: CPT

## 2020-05-06 PROCEDURE — 96417 CHEMO IV INFUS EACH ADDL SEQ: CPT

## 2020-05-06 PROCEDURE — 96375 TX/PRO/DX INJ NEW DRUG ADDON: CPT

## 2020-05-06 PROCEDURE — 96413 CHEMO IV INFUSION 1 HR: CPT

## 2020-05-06 PROCEDURE — 96415 CHEMO IV INFUSION ADDL HR: CPT

## 2020-05-06 PROCEDURE — 2580000003 HC RX 258: Performed by: INTERNAL MEDICINE

## 2020-05-06 PROCEDURE — 6360000002 HC RX W HCPCS: Performed by: INTERNAL MEDICINE

## 2020-05-06 RX ORDER — PALONOSETRON 0.05 MG/ML
INJECTION, SOLUTION INTRAVENOUS
Status: DISPENSED
Start: 2020-05-06 | End: 2020-05-06

## 2020-05-06 RX ORDER — PALONOSETRON 0.05 MG/ML
0.25 INJECTION, SOLUTION INTRAVENOUS ONCE
Status: DISCONTINUED | OUTPATIENT
Start: 2020-05-06 | End: 2020-05-07 | Stop reason: HOSPADM

## 2020-05-08 ENCOUNTER — HOSPITAL ENCOUNTER (OUTPATIENT)
Dept: INFUSION THERAPY | Age: 75
Discharge: HOME | End: 2020-05-08
Payer: MEDICARE

## 2020-05-08 PROCEDURE — 96523 IRRIG DRUG DELIVERY DEVICE: CPT

## 2020-05-08 PROCEDURE — 6360000002 HC RX W HCPCS

## 2020-05-08 RX ORDER — HEPARIN SODIUM (PORCINE) LOCK FLUSH IV SOLN 100 UNIT/ML 100 UNIT/ML
SOLUTION INTRAVENOUS
Status: DISCONTINUED
Start: 2020-05-08 | End: 2020-05-09 | Stop reason: HOSPADM

## 2020-05-12 ENCOUNTER — HOSPITAL ENCOUNTER (OUTPATIENT)
Dept: INFUSION THERAPY | Age: 75
Discharge: HOME | End: 2020-05-12
Payer: MEDICARE

## 2020-05-12 PROCEDURE — 99211 OFF/OP EST MAY X REQ PHY/QHP: CPT

## 2020-05-19 ENCOUNTER — HOSPITAL ENCOUNTER (OUTPATIENT)
Dept: INFUSION THERAPY | Age: 75
Discharge: HOME | End: 2020-05-19
Payer: MEDICARE

## 2020-05-19 LAB
ALBUMIN SERPL-MCNC: 3.7 GM/DL (ref 3.4–5)
ALP BLD-CCNC: 288 IU/L (ref 40–129)
ALT SERPL-CCNC: 31 U/L (ref 10–40)
ANION GAP SERPL CALCULATED.3IONS-SCNC: 10 MMOL/L (ref 4–16)
AST SERPL-CCNC: 45 IU/L (ref 15–37)
BASOPHILS ABSOLUTE: 0 K/CU MM
BASOPHILS RELATIVE PERCENT: 0.6 % (ref 0–1)
BILIRUB SERPL-MCNC: 0.3 MG/DL (ref 0–1)
BUN BLDV-MCNC: 13 MG/DL (ref 6–23)
CALCIUM SERPL-MCNC: 9.3 MG/DL (ref 8.3–10.6)
CHLORIDE BLD-SCNC: 107 MMOL/L (ref 99–110)
CO2: 29 MMOL/L (ref 21–32)
CREAT SERPL-MCNC: 0.9 MG/DL (ref 0.6–1.1)
DIFFERENTIAL TYPE: ABNORMAL
EOSINOPHILS ABSOLUTE: 0.1 K/CU MM
EOSINOPHILS RELATIVE PERCENT: 2.1 % (ref 0–3)
GFR AFRICAN AMERICAN: >60 ML/MIN/1.73M2
GFR NON-AFRICAN AMERICAN: >60 ML/MIN/1.73M2
GLUCOSE BLD-MCNC: 112 MG/DL (ref 70–99)
HCT VFR BLD CALC: 35.3 % (ref 37–47)
HEMOGLOBIN: 11.4 GM/DL (ref 12.5–16)
LYMPHOCYTES ABSOLUTE: 1.1 K/CU MM
LYMPHOCYTES RELATIVE PERCENT: 31.2 % (ref 24–44)
MCH RBC QN AUTO: 28.6 PG (ref 27–31)
MCHC RBC AUTO-ENTMCNC: 32.3 % (ref 32–36)
MCV RBC AUTO: 88.5 FL (ref 78–100)
MONOCYTES ABSOLUTE: 0.6 K/CU MM
MONOCYTES RELATIVE PERCENT: 18.4 % (ref 0–4)
PDW BLD-RTO: 15 % (ref 11.7–14.9)
PLATELET # BLD: 113 K/CU MM (ref 140–440)
PMV BLD AUTO: 10.5 FL (ref 7.5–11.1)
POTASSIUM SERPL-SCNC: 3.9 MMOL/L (ref 3.5–5.1)
RBC # BLD: 3.99 M/CU MM (ref 4.2–5.4)
SEGMENTED NEUTROPHILS ABSOLUTE COUNT: 1.6 K/CU MM
SEGMENTED NEUTROPHILS RELATIVE PERCENT: 47.7 % (ref 36–66)
SODIUM BLD-SCNC: 146 MMOL/L (ref 135–145)
TOTAL PROTEIN: 6.8 GM/DL (ref 6.4–8.2)
WBC # BLD: 3.4 K/CU MM (ref 4–10.5)

## 2020-05-19 PROCEDURE — 80053 COMPREHEN METABOLIC PANEL: CPT

## 2020-05-19 PROCEDURE — 85025 COMPLETE CBC W/AUTO DIFF WBC: CPT

## 2020-05-19 PROCEDURE — 36415 COLL VENOUS BLD VENIPUNCTURE: CPT

## 2020-05-20 ENCOUNTER — HOSPITAL ENCOUNTER (OUTPATIENT)
Dept: INFUSION THERAPY | Age: 75
Discharge: HOME | End: 2020-05-20
Payer: MEDICARE

## 2020-05-20 PROCEDURE — 96375 TX/PRO/DX INJ NEW DRUG ADDON: CPT

## 2020-05-20 PROCEDURE — 96368 THER/DIAG CONCURRENT INF: CPT

## 2020-05-20 PROCEDURE — 96413 CHEMO IV INFUSION 1 HR: CPT

## 2020-05-20 PROCEDURE — 96417 CHEMO IV INFUS EACH ADDL SEQ: CPT

## 2020-05-20 PROCEDURE — 6360000002 HC RX W HCPCS

## 2020-05-20 PROCEDURE — 96415 CHEMO IV INFUSION ADDL HR: CPT

## 2020-05-20 PROCEDURE — 2580000003 HC RX 258: Performed by: INTERNAL MEDICINE

## 2020-05-20 PROCEDURE — 6360000002 HC RX W HCPCS: Performed by: INTERNAL MEDICINE

## 2020-05-20 PROCEDURE — 96411 CHEMO IV PUSH ADDL DRUG: CPT

## 2020-05-20 RX ORDER — PALONOSETRON 0.05 MG/ML
INJECTION, SOLUTION INTRAVENOUS
Status: DISPENSED
Start: 2020-05-20 | End: 2020-05-20

## 2020-05-20 RX ORDER — PALONOSETRON 0.05 MG/ML
0.25 INJECTION, SOLUTION INTRAVENOUS ONCE
Status: DISCONTINUED | OUTPATIENT
Start: 2020-05-20 | End: 2020-05-21 | Stop reason: HOSPADM

## 2020-05-22 ENCOUNTER — HOSPITAL ENCOUNTER (OUTPATIENT)
Dept: INFUSION THERAPY | Age: 75
Discharge: HOME | End: 2020-05-22
Payer: MEDICARE

## 2020-05-22 PROCEDURE — 6360000002 HC RX W HCPCS

## 2020-05-22 PROCEDURE — 96523 IRRIG DRUG DELIVERY DEVICE: CPT

## 2020-05-22 RX ORDER — HEPARIN SODIUM (PORCINE) LOCK FLUSH IV SOLN 100 UNIT/ML 100 UNIT/ML
SOLUTION INTRAVENOUS
Status: DISPENSED
Start: 2020-05-22 | End: 2020-05-22

## 2020-05-27 ENCOUNTER — HOSPITAL ENCOUNTER (OUTPATIENT)
Dept: CT IMAGING | Age: 75
Discharge: HOME | End: 2020-05-27
Payer: MEDICARE

## 2020-05-27 PROCEDURE — 71250 CT THORAX DX C-: CPT

## 2020-06-02 ENCOUNTER — HOSPITAL ENCOUNTER (OUTPATIENT)
Dept: INFUSION THERAPY | Age: 75
Discharge: HOME | End: 2020-06-02
Payer: MEDICARE

## 2020-06-02 LAB
ALBUMIN SERPL-MCNC: 3.7 GM/DL (ref 3.4–5)
ALP BLD-CCNC: 282 IU/L (ref 40–128)
ALT SERPL-CCNC: 29 U/L (ref 10–40)
ANION GAP SERPL CALCULATED.3IONS-SCNC: 13 MMOL/L (ref 4–16)
AST SERPL-CCNC: 54 IU/L (ref 15–37)
BASOPHILS ABSOLUTE: 0 K/CU MM
BASOPHILS RELATIVE PERCENT: 0.8 % (ref 0–1)
BILIRUB SERPL-MCNC: 0.4 MG/DL (ref 0–1)
BUN BLDV-MCNC: 12 MG/DL (ref 6–23)
CALCIUM SERPL-MCNC: 9.4 MG/DL (ref 8.3–10.6)
CEA: 22.6 NG/ML
CHLORIDE BLD-SCNC: 107 MMOL/L (ref 99–110)
CO2: 26 MMOL/L (ref 21–32)
CREAT SERPL-MCNC: 1 MG/DL (ref 0.6–1.1)
DIFFERENTIAL TYPE: ABNORMAL
EOSINOPHILS ABSOLUTE: 0.1 K/CU MM
EOSINOPHILS RELATIVE PERCENT: 1.3 % (ref 0–3)
GFR AFRICAN AMERICAN: >60 ML/MIN/1.73M2
GFR AFRICAN AMERICAN: >60 ML/MIN/1.73M2
GFR NON-AFRICAN AMERICAN: 54 ML/MIN/1.73M2
GFR NON-AFRICAN AMERICAN: 54 ML/MIN/1.73M2
GLUCOSE BLD-MCNC: 133 MG/DL (ref 70–99)
GLUCOSE BLD-MCNC: 137 MG/DL (ref 70–99)
HCT VFR BLD CALC: 32.4 % (ref 37–47)
HEMOGLOBIN: 10.4 GM/DL (ref 12.5–16)
LYMPHOCYTES ABSOLUTE: 1.1 K/CU MM
LYMPHOCYTES RELATIVE PERCENT: 27.9 % (ref 24–44)
MCH RBC QN AUTO: 28.9 PG (ref 27–31)
MCHC RBC AUTO-ENTMCNC: 32.1 % (ref 32–36)
MCV RBC AUTO: 90 FL (ref 78–100)
MONOCYTES ABSOLUTE: 0.6 K/CU MM
MONOCYTES RELATIVE PERCENT: 14.9 % (ref 0–4)
PDW BLD-RTO: 15.4 % (ref 11.7–14.9)
PLATELET # BLD: 129 K/CU MM (ref 140–440)
PMV BLD AUTO: 11 FL (ref 7.5–11.1)
POC CALCIUM: 1.21 MMOL/L (ref 1.12–1.32)
POC CHLORIDE: 109 MMOL/L (ref 98–109)
POC CREATININE: 1 MG/DL (ref 0.6–1.1)
POTASSIUM SERPL-SCNC: 3.4 MMOL/L (ref 3.6–5.1)
POTASSIUM SERPL-SCNC: 3.7 MMOL/L (ref 3.5–5.1)
RBC # BLD: 3.6 M/CU MM (ref 4.2–5.4)
SEGMENTED NEUTROPHILS ABSOLUTE COUNT: 2.1 K/CU MM
SEGMENTED NEUTROPHILS RELATIVE PERCENT: 55.1 % (ref 36–66)
SODIUM BLD-SCNC: 146 MMOL/L (ref 135–145)
SODIUM BLD-SCNC: 148 MMOL/L (ref 136–145)
TOTAL PROTEIN: 6.8 GM/DL (ref 6.4–8.2)
WBC # BLD: 3.8 K/CU MM (ref 4–10.5)

## 2020-06-02 PROCEDURE — 36415 COLL VENOUS BLD VENIPUNCTURE: CPT

## 2020-06-02 PROCEDURE — 82378 CARCINOEMBRYONIC ANTIGEN: CPT

## 2020-06-02 PROCEDURE — 99211 OFF/OP EST MAY X REQ PHY/QHP: CPT

## 2020-06-02 PROCEDURE — 85025 COMPLETE CBC W/AUTO DIFF WBC: CPT

## 2020-06-02 PROCEDURE — 80053 COMPREHEN METABOLIC PANEL: CPT

## 2020-06-03 ENCOUNTER — HOSPITAL ENCOUNTER (OUTPATIENT)
Dept: INFUSION THERAPY | Age: 75
Discharge: HOME | End: 2020-06-03
Payer: MEDICARE

## 2020-06-03 PROCEDURE — 6360000002 HC RX W HCPCS: Performed by: INTERNAL MEDICINE

## 2020-06-03 PROCEDURE — 96368 THER/DIAG CONCURRENT INF: CPT

## 2020-06-03 PROCEDURE — 96417 CHEMO IV INFUS EACH ADDL SEQ: CPT

## 2020-06-03 PROCEDURE — 96415 CHEMO IV INFUSION ADDL HR: CPT

## 2020-06-03 PROCEDURE — 96375 TX/PRO/DX INJ NEW DRUG ADDON: CPT

## 2020-06-03 PROCEDURE — 2580000003 HC RX 258: Performed by: INTERNAL MEDICINE

## 2020-06-03 PROCEDURE — 96413 CHEMO IV INFUSION 1 HR: CPT

## 2020-06-03 PROCEDURE — 6360000002 HC RX W HCPCS

## 2020-06-03 RX ORDER — PALONOSETRON 0.05 MG/ML
INJECTION, SOLUTION INTRAVENOUS
Status: DISPENSED
Start: 2020-06-03 | End: 2020-06-03

## 2020-06-03 RX ORDER — PALONOSETRON 0.05 MG/ML
0.25 INJECTION, SOLUTION INTRAVENOUS ONCE
Status: DISCONTINUED | OUTPATIENT
Start: 2020-06-03 | End: 2020-06-04 | Stop reason: HOSPADM

## 2020-06-05 ENCOUNTER — HOSPITAL ENCOUNTER (OUTPATIENT)
Dept: INFUSION THERAPY | Age: 75
Discharge: HOME | End: 2020-06-05
Payer: MEDICARE

## 2020-06-05 PROCEDURE — 96523 IRRIG DRUG DELIVERY DEVICE: CPT

## 2020-06-05 PROCEDURE — 6360000002 HC RX W HCPCS

## 2020-06-05 RX ORDER — HEPARIN SODIUM (PORCINE) LOCK FLUSH IV SOLN 100 UNIT/ML 100 UNIT/ML
SOLUTION INTRAVENOUS
Status: DISPENSED
Start: 2020-06-05 | End: 2020-06-05

## 2020-06-16 ENCOUNTER — HOSPITAL ENCOUNTER (OUTPATIENT)
Dept: INFUSION THERAPY | Age: 75
Discharge: HOME | End: 2020-06-16
Payer: MEDICARE

## 2020-06-16 LAB
ALBUMIN SERPL-MCNC: 3.8 GM/DL (ref 3.4–5)
ALP BLD-CCNC: 269 IU/L (ref 40–128)
ALT SERPL-CCNC: 28 U/L (ref 10–40)
ANION GAP SERPL CALCULATED.3IONS-SCNC: 11 MMOL/L (ref 4–16)
AST SERPL-CCNC: 51 IU/L (ref 15–37)
BASOPHILS ABSOLUTE: 0 K/CU MM
BASOPHILS RELATIVE PERCENT: 0.6 % (ref 0–1)
BILIRUB SERPL-MCNC: 0.3 MG/DL (ref 0–1)
BUN BLDV-MCNC: 10 MG/DL (ref 6–23)
CALCIUM SERPL-MCNC: 9.4 MG/DL (ref 8.3–10.6)
CEA: 28.1 NG/ML
CHLORIDE BLD-SCNC: 107 MMOL/L (ref 99–110)
CO2: 26 MMOL/L (ref 21–32)
CREAT SERPL-MCNC: 1 MG/DL (ref 0.6–1.1)
DIFFERENTIAL TYPE: ABNORMAL
EOSINOPHILS ABSOLUTE: 0.1 K/CU MM
EOSINOPHILS RELATIVE PERCENT: 2.5 % (ref 0–3)
GFR AFRICAN AMERICAN: >60 ML/MIN/1.73M2
GFR AFRICAN AMERICAN: >60 ML/MIN/1.73M2
GFR NON-AFRICAN AMERICAN: 50 ML/MIN/1.73M2
GFR NON-AFRICAN AMERICAN: 54 ML/MIN/1.73M2
GLUCOSE BLD-MCNC: 147 MG/DL (ref 70–99)
GLUCOSE BLD-MCNC: 147 MG/DL (ref 70–99)
HCT VFR BLD CALC: 30.9 % (ref 37–47)
HEMOGLOBIN: 10 GM/DL (ref 12.5–16)
LYMPHOCYTES ABSOLUTE: 1 K/CU MM
LYMPHOCYTES RELATIVE PERCENT: 29.4 % (ref 24–44)
MCH RBC QN AUTO: 29.6 PG (ref 27–31)
MCHC RBC AUTO-ENTMCNC: 32.4 % (ref 32–36)
MCV RBC AUTO: 91.4 FL (ref 78–100)
MONOCYTES ABSOLUTE: 0.5 K/CU MM
MONOCYTES RELATIVE PERCENT: 16.3 % (ref 0–4)
PDW BLD-RTO: 15.6 % (ref 11.7–14.9)
PLATELET # BLD: 105 K/CU MM (ref 140–440)
PMV BLD AUTO: 10.6 FL (ref 7.5–11.1)
POC CALCIUM: 1.18 MMOL/L (ref 1.12–1.32)
POC CHLORIDE: 108 MMOL/L (ref 98–109)
POC CREATININE: 1.1 MG/DL (ref 0.6–1.1)
POTASSIUM SERPL-SCNC: 3.6 MMOL/L (ref 3.6–5.1)
POTASSIUM SERPL-SCNC: 3.9 MMOL/L (ref 3.5–5.1)
RBC # BLD: 3.38 M/CU MM (ref 4.2–5.4)
SEGMENTED NEUTROPHILS ABSOLUTE COUNT: 1.7 K/CU MM
SEGMENTED NEUTROPHILS RELATIVE PERCENT: 51.2 % (ref 36–66)
SODIUM BLD-SCNC: 144 MMOL/L (ref 135–145)
SODIUM BLD-SCNC: 147 MMOL/L (ref 136–145)
TOTAL PROTEIN: 6.6 GM/DL (ref 6.4–8.2)
WBC # BLD: 3.3 K/CU MM (ref 4–10.5)

## 2020-06-16 PROCEDURE — 80053 COMPREHEN METABOLIC PANEL: CPT

## 2020-06-16 PROCEDURE — 85025 COMPLETE CBC W/AUTO DIFF WBC: CPT

## 2020-06-16 PROCEDURE — 82378 CARCINOEMBRYONIC ANTIGEN: CPT

## 2020-06-17 ENCOUNTER — HOSPITAL ENCOUNTER (OUTPATIENT)
Dept: INFUSION THERAPY | Age: 75
Discharge: HOME | End: 2020-06-17
Payer: MEDICARE

## 2020-06-17 PROCEDURE — 96375 TX/PRO/DX INJ NEW DRUG ADDON: CPT

## 2020-06-17 PROCEDURE — 96415 CHEMO IV INFUSION ADDL HR: CPT

## 2020-06-17 PROCEDURE — 6360000002 HC RX W HCPCS: Performed by: INTERNAL MEDICINE

## 2020-06-17 PROCEDURE — 96417 CHEMO IV INFUS EACH ADDL SEQ: CPT

## 2020-06-17 PROCEDURE — 6360000002 HC RX W HCPCS

## 2020-06-17 PROCEDURE — 2580000003 HC RX 258: Performed by: INTERNAL MEDICINE

## 2020-06-17 PROCEDURE — 96368 THER/DIAG CONCURRENT INF: CPT

## 2020-06-17 PROCEDURE — 96413 CHEMO IV INFUSION 1 HR: CPT

## 2020-06-17 RX ORDER — PALONOSETRON 0.05 MG/ML
0.25 INJECTION, SOLUTION INTRAVENOUS ONCE
Status: DISCONTINUED | OUTPATIENT
Start: 2020-06-17 | End: 2020-06-18 | Stop reason: HOSPADM

## 2020-06-17 RX ORDER — PALONOSETRON 0.05 MG/ML
INJECTION, SOLUTION INTRAVENOUS
Status: DISPENSED
Start: 2020-06-17 | End: 2020-06-17

## 2020-06-19 ENCOUNTER — HOSPITAL ENCOUNTER (OUTPATIENT)
Dept: INFUSION THERAPY | Age: 75
Discharge: HOME | End: 2020-06-19
Payer: MEDICARE

## 2020-06-19 PROCEDURE — 96523 IRRIG DRUG DELIVERY DEVICE: CPT

## 2020-06-19 PROCEDURE — 6360000002 HC RX W HCPCS

## 2020-06-19 RX ORDER — HEPARIN SODIUM (PORCINE) LOCK FLUSH IV SOLN 100 UNIT/ML 100 UNIT/ML
SOLUTION INTRAVENOUS
Status: DISPENSED
Start: 2020-06-19 | End: 2020-06-19

## 2020-06-22 ENCOUNTER — OFFICE VISIT (OUTPATIENT)
Dept: CARDIOLOGY CLINIC | Age: 75
End: 2020-06-22
Payer: MEDICARE

## 2020-06-22 VITALS
WEIGHT: 142 LBS | HEART RATE: 82 BPM | DIASTOLIC BLOOD PRESSURE: 76 MMHG | BODY MASS INDEX: 23.66 KG/M2 | HEIGHT: 65 IN | SYSTOLIC BLOOD PRESSURE: 138 MMHG

## 2020-06-22 PROBLEM — K92.2 GI BLEED: Status: RESOLVED | Noted: 2019-11-10 | Resolved: 2020-06-22

## 2020-06-22 PROBLEM — C79.9 METASTATIC CANCER (HCC): Status: ACTIVE | Noted: 2020-06-22

## 2020-06-22 PROBLEM — C79.9 METASTATIC CANCER (HCC): Status: RESOLVED | Noted: 2020-06-22 | Resolved: 2020-06-22

## 2020-06-22 PROCEDURE — 99214 OFFICE O/P EST MOD 30 MIN: CPT | Performed by: INTERNAL MEDICINE

## 2020-06-22 PROCEDURE — G8420 CALC BMI NORM PARAMETERS: HCPCS | Performed by: INTERNAL MEDICINE

## 2020-06-22 PROCEDURE — 93000 ELECTROCARDIOGRAM COMPLETE: CPT | Performed by: INTERNAL MEDICINE

## 2020-06-22 PROCEDURE — G8427 DOCREV CUR MEDS BY ELIG CLIN: HCPCS | Performed by: INTERNAL MEDICINE

## 2020-06-22 PROCEDURE — 1123F ACP DISCUSS/DSCN MKR DOCD: CPT | Performed by: INTERNAL MEDICINE

## 2020-06-22 PROCEDURE — 1036F TOBACCO NON-USER: CPT | Performed by: INTERNAL MEDICINE

## 2020-06-22 PROCEDURE — 4040F PNEUMOC VAC/ADMIN/RCVD: CPT | Performed by: INTERNAL MEDICINE

## 2020-06-22 PROCEDURE — 3017F COLORECTAL CA SCREEN DOC REV: CPT | Performed by: INTERNAL MEDICINE

## 2020-06-22 PROCEDURE — 1090F PRES/ABSN URINE INCON ASSESS: CPT | Performed by: INTERNAL MEDICINE

## 2020-06-22 PROCEDURE — G8400 PT W/DXA NO RESULTS DOC: HCPCS | Performed by: INTERNAL MEDICINE

## 2020-06-22 NOTE — PATIENT INSTRUCTIONS
Continue current cardiovascular medications which have been reviewed and discussed individually with you. After visit summery is provided. Questions answered and patient verbalizes understanding. Follow up in yearly with ECG,  sooner if needed.

## 2020-06-22 NOTE — PROGRESS NOTES
Breath  Patient not taking: Reported on 6/22/2020 3/12/20   Erma Villanueva MD   pantoprazole (PROTONIX) 40 MG tablet Take 1 tablet by mouth 2 times daily (before meals)  Patient not taking: Reported on 6/22/2020 1/7/20   Berneice Moritz, MD     Vitals:    06/22/20 1345   BP: 138/76   Pulse: 82   Weight: 142 lb (64.4 kg)   Height: 5' 5\" (1.651 m)      Body mass index is 23.63 kg/m². Wt Readings from Last 3 Encounters:   06/22/20 142 lb (64.4 kg)   03/18/20 146 lb 3.2 oz (66.3 kg)   03/12/20 148 lb (67.1 kg)     Constitutional: Normally built and nourished pleasant female in no apparent distress.  She  lost 18  pounds since last visit June 2019. Eyes: Wears glasses.  Pupils are equal.  Conjunctiva pallor noted. ENT: She is hard of hearing is not having any hearing aids  NECK: No JVP or thyromegaly  Cardiovascular:  Auscultation: Normal S1 and L5. 5/5 systolic ejection murmur noted in the aortic area and left sternal border. Carotids negative for bruits.  Abdominal aorta is normal.  No epigastric bruit noted. Peripheral pulses: 1+ equal in both feet  Respiratory:  Respiratory effort is normal  Breath sounds are clear to auscultation  Extremities:  No edema, clubbing,  Cyanosis, petechiae. SKIN: Warm and well perfused, no pallor or cyanosis  Abdomen:  No masses or tenderness. No organomegaly noted. Musculoskeletal:  No spinal deformities noted.  Gait is normal  Muscle strength is normal  Neurologic:  Oriented to time, place, and person and non-anxious. No focal neurological deficit. Hearing is impaired. EKG today is consistent with the sinus rhythm 83 bpm diffuse nonspecific ST-T abnormality are present. Compared to 2018 EKG lateral T-wave abnormalities are more pronounced otherwise there is no significant change. Echocardiogram done May 29, 2019 reported   LV function and size are normal, Ejection Fraction 55-60 %. Mild asymetric left ventricular hypertrophy of the septal wall.    No regional wall motion

## 2020-06-30 ENCOUNTER — HOSPITAL ENCOUNTER (OUTPATIENT)
Dept: INFUSION THERAPY | Age: 75
Discharge: HOME | End: 2020-06-30
Payer: MEDICARE

## 2020-06-30 LAB
ALBUMIN SERPL-MCNC: 3.6 GM/DL (ref 3.4–5)
ALP BLD-CCNC: 385 IU/L (ref 40–128)
ALT SERPL-CCNC: 24 U/L (ref 10–40)
ANION GAP SERPL CALCULATED.3IONS-SCNC: 9 MMOL/L (ref 4–16)
AST SERPL-CCNC: 52 IU/L (ref 15–37)
BASOPHILS ABSOLUTE: 0 K/CU MM
BASOPHILS RELATIVE PERCENT: 0.4 % (ref 0–1)
BILIRUB SERPL-MCNC: 0.6 MG/DL (ref 0–1)
BUN BLDV-MCNC: 11 MG/DL (ref 6–23)
CALCIUM SERPL-MCNC: 9.3 MG/DL (ref 8.3–10.6)
CEA: 38.1 NG/ML
CHLORIDE BLD-SCNC: 109 MMOL/L (ref 99–110)
CO2: 26 MMOL/L (ref 21–32)
CREAT SERPL-MCNC: 1 MG/DL (ref 0.6–1.1)
DIFFERENTIAL TYPE: ABNORMAL
EOSINOPHILS ABSOLUTE: 0.1 K/CU MM
EOSINOPHILS RELATIVE PERCENT: 1.4 % (ref 0–3)
GFR AFRICAN AMERICAN: >60 ML/MIN/1.73M2
GFR NON-AFRICAN AMERICAN: 54 ML/MIN/1.73M2
GLUCOSE BLD-MCNC: 112 MG/DL (ref 70–99)
HCT VFR BLD CALC: 29 % (ref 37–47)
HEMOGLOBIN: 9.4 GM/DL (ref 12.5–16)
LYMPHOCYTES ABSOLUTE: 1 K/CU MM
LYMPHOCYTES RELATIVE PERCENT: 20.7 % (ref 24–44)
MCH RBC QN AUTO: 29.6 PG (ref 27–31)
MCHC RBC AUTO-ENTMCNC: 32.4 % (ref 32–36)
MCV RBC AUTO: 91.2 FL (ref 78–100)
MONOCYTES ABSOLUTE: 1.3 K/CU MM
MONOCYTES RELATIVE PERCENT: 25.9 % (ref 0–4)
PDW BLD-RTO: 16.3 % (ref 11.7–14.9)
PLATELET # BLD: 155 K/CU MM (ref 140–440)
PMV BLD AUTO: 10.6 FL (ref 7.5–11.1)
POTASSIUM SERPL-SCNC: 4.3 MMOL/L (ref 3.5–5.1)
RBC # BLD: 3.18 M/CU MM (ref 4.2–5.4)
SEGMENTED NEUTROPHILS ABSOLUTE COUNT: 2.6 K/CU MM
SEGMENTED NEUTROPHILS RELATIVE PERCENT: 51.6 % (ref 36–66)
SODIUM BLD-SCNC: 144 MMOL/L (ref 135–145)
TOTAL PROTEIN: 6.8 GM/DL (ref 6.4–8.2)
WBC # BLD: 5 K/CU MM (ref 4–10.5)

## 2020-06-30 PROCEDURE — 36415 COLL VENOUS BLD VENIPUNCTURE: CPT

## 2020-06-30 PROCEDURE — 99211 OFF/OP EST MAY X REQ PHY/QHP: CPT

## 2020-06-30 PROCEDURE — 80053 COMPREHEN METABOLIC PANEL: CPT

## 2020-06-30 PROCEDURE — 85025 COMPLETE CBC W/AUTO DIFF WBC: CPT

## 2020-06-30 PROCEDURE — 82378 CARCINOEMBRYONIC ANTIGEN: CPT

## 2020-07-01 ENCOUNTER — HOSPITAL ENCOUNTER (OUTPATIENT)
Dept: INFUSION THERAPY | Age: 75
Discharge: HOME | End: 2020-07-01
Payer: MEDICARE

## 2020-07-01 PROCEDURE — 96417 CHEMO IV INFUS EACH ADDL SEQ: CPT

## 2020-07-01 PROCEDURE — 96368 THER/DIAG CONCURRENT INF: CPT

## 2020-07-01 PROCEDURE — 96375 TX/PRO/DX INJ NEW DRUG ADDON: CPT

## 2020-07-01 PROCEDURE — 6360000002 HC RX W HCPCS: Performed by: INTERNAL MEDICINE

## 2020-07-01 PROCEDURE — 2580000003 HC RX 258: Performed by: INTERNAL MEDICINE

## 2020-07-01 PROCEDURE — 96413 CHEMO IV INFUSION 1 HR: CPT

## 2020-07-01 PROCEDURE — 6360000002 HC RX W HCPCS

## 2020-07-01 PROCEDURE — 96415 CHEMO IV INFUSION ADDL HR: CPT

## 2020-07-01 RX ORDER — PALONOSETRON 0.05 MG/ML
INJECTION, SOLUTION INTRAVENOUS
Status: DISPENSED
Start: 2020-07-01 | End: 2020-07-01

## 2020-07-01 RX ORDER — PALONOSETRON 0.05 MG/ML
0.25 INJECTION, SOLUTION INTRAVENOUS ONCE
Status: DISCONTINUED | OUTPATIENT
Start: 2020-07-01 | End: 2020-07-02 | Stop reason: HOSPADM

## 2020-07-03 ENCOUNTER — HOSPITAL ENCOUNTER (OUTPATIENT)
Dept: INFUSION THERAPY | Age: 75
Setting detail: INFUSION SERIES
Discharge: HOME | End: 2020-07-03
Payer: MEDICARE

## 2020-07-03 PROCEDURE — 6360000002 HC RX W HCPCS: Performed by: INTERNAL MEDICINE

## 2020-07-03 PROCEDURE — 96523 IRRIG DRUG DELIVERY DEVICE: CPT

## 2020-07-03 PROCEDURE — 2580000003 HC RX 258: Performed by: INTERNAL MEDICINE

## 2020-07-03 RX ORDER — SODIUM CHLORIDE 0.9 % (FLUSH) 0.9 %
10 SYRINGE (ML) INJECTION PRN
Status: DISCONTINUED | OUTPATIENT
Start: 2020-07-03 | End: 2020-07-04 | Stop reason: HOSPADM

## 2020-07-03 RX ORDER — HEPARIN SODIUM (PORCINE) LOCK FLUSH IV SOLN 100 UNIT/ML 100 UNIT/ML
500 SOLUTION INTRAVENOUS PRN
Status: DISCONTINUED | OUTPATIENT
Start: 2020-07-03 | End: 2020-07-04 | Stop reason: HOSPADM

## 2020-07-03 RX ADMIN — SODIUM CHLORIDE, PRESERVATIVE FREE 10 ML: 5 INJECTION INTRAVENOUS at 13:40

## 2020-07-03 RX ADMIN — Medication 500 UNITS: at 13:40

## 2020-07-03 NOTE — PROGRESS NOTES
Alejandra deaccessed. DSD applied. Pt tolerated well. Discharged instructions given to pt, pt voiced understanding. Pt discharged via AMBULATORY by SELF TO EXIT.

## 2020-07-03 NOTE — PROGRESS NOTES
Pt taken to room 00 FOR CHEMO PUMP D/C . Pt oriented to room, call light, bed/chair controls, TV, pt voiced understanding. Plan of care explained to pt, pt voiced understanding.

## 2020-07-14 ENCOUNTER — HOSPITAL ENCOUNTER (OUTPATIENT)
Dept: INFUSION THERAPY | Age: 75
Discharge: HOME | End: 2020-07-14
Payer: MEDICARE

## 2020-07-14 LAB
ALBUMIN SERPL-MCNC: 3.8 GM/DL (ref 3.4–5)
ALP BLD-CCNC: 331 IU/L (ref 40–128)
ALT SERPL-CCNC: 25 U/L (ref 10–40)
ANION GAP SERPL CALCULATED.3IONS-SCNC: 15 MMOL/L (ref 4–16)
AST SERPL-CCNC: 48 IU/L (ref 15–37)
BASOPHILS ABSOLUTE: 0 K/CU MM
BASOPHILS RELATIVE PERCENT: 1 % (ref 0–1)
BILIRUB SERPL-MCNC: 0.5 MG/DL (ref 0–1)
BUN BLDV-MCNC: 11 MG/DL (ref 6–23)
CALCIUM SERPL-MCNC: 9.5 MG/DL (ref 8.3–10.6)
CEA: 50.1 NG/ML
CHLORIDE BLD-SCNC: 106 MMOL/L (ref 99–110)
CO2: 24 MMOL/L (ref 21–32)
CREAT SERPL-MCNC: 1 MG/DL (ref 0.6–1.1)
DIFFERENTIAL TYPE: ABNORMAL
EOSINOPHILS ABSOLUTE: 0.1 K/CU MM
EOSINOPHILS RELATIVE PERCENT: 1.2 % (ref 0–3)
GFR AFRICAN AMERICAN: >60 ML/MIN/1.73M2
GFR AFRICAN AMERICAN: >60 ML/MIN/1.73M2
GFR NON-AFRICAN AMERICAN: 54 ML/MIN/1.73M2
GFR NON-AFRICAN AMERICAN: >60 ML/MIN/1.73M2
GLUCOSE BLD-MCNC: 130 MG/DL (ref 70–99)
GLUCOSE BLD-MCNC: 131 MG/DL (ref 70–99)
HCT VFR BLD CALC: 32.1 % (ref 37–47)
HEMOGLOBIN: 10.2 GM/DL (ref 12.5–16)
LYMPHOCYTES ABSOLUTE: 1.2 K/CU MM
LYMPHOCYTES RELATIVE PERCENT: 28.5 % (ref 24–44)
MCH RBC QN AUTO: 29.7 PG (ref 27–31)
MCHC RBC AUTO-ENTMCNC: 31.8 % (ref 32–36)
MCV RBC AUTO: 93.3 FL (ref 78–100)
MONOCYTES ABSOLUTE: 0.8 K/CU MM
MONOCYTES RELATIVE PERCENT: 18.2 % (ref 0–4)
PDW BLD-RTO: 16.1 % (ref 11.7–14.9)
PLATELET # BLD: 123 K/CU MM (ref 140–440)
PMV BLD AUTO: 11.2 FL (ref 7.5–11.1)
POC CALCIUM: 1.19 MMOL/L (ref 1.12–1.32)
POC CHLORIDE: 110 MMOL/L (ref 98–109)
POC CREATININE: 1 MG/DL (ref 0.6–1.1)
POTASSIUM SERPL-SCNC: 3.7 MMOL/L (ref 3.6–5.1)
POTASSIUM SERPL-SCNC: 4 MMOL/L (ref 3.5–5.1)
RBC # BLD: 3.44 M/CU MM (ref 4.2–5.4)
SEGMENTED NEUTROPHILS ABSOLUTE COUNT: 2.1 K/CU MM
SEGMENTED NEUTROPHILS RELATIVE PERCENT: 51.1 % (ref 36–66)
SODIUM BLD-SCNC: 145 MMOL/L (ref 135–145)
SODIUM BLD-SCNC: 146 MMOL/L (ref 136–145)
TOTAL PROTEIN: 7 GM/DL (ref 6.4–8.2)
WBC # BLD: 4.1 K/CU MM (ref 4–10.5)

## 2020-07-14 PROCEDURE — 80053 COMPREHEN METABOLIC PANEL: CPT

## 2020-07-14 PROCEDURE — 36415 COLL VENOUS BLD VENIPUNCTURE: CPT

## 2020-07-14 PROCEDURE — 82378 CARCINOEMBRYONIC ANTIGEN: CPT

## 2020-07-14 PROCEDURE — 99211 OFF/OP EST MAY X REQ PHY/QHP: CPT

## 2020-07-14 PROCEDURE — 85025 COMPLETE CBC W/AUTO DIFF WBC: CPT

## 2020-07-15 ENCOUNTER — HOSPITAL ENCOUNTER (OUTPATIENT)
Dept: INFUSION THERAPY | Age: 75
Discharge: HOME | End: 2020-07-15
Payer: MEDICARE

## 2020-07-15 PROCEDURE — 96417 CHEMO IV INFUS EACH ADDL SEQ: CPT

## 2020-07-15 PROCEDURE — 96367 TX/PROPH/DG ADDL SEQ IV INF: CPT

## 2020-07-15 PROCEDURE — 96375 TX/PRO/DX INJ NEW DRUG ADDON: CPT

## 2020-07-15 PROCEDURE — 96411 CHEMO IV PUSH ADDL DRUG: CPT

## 2020-07-15 PROCEDURE — 96368 THER/DIAG CONCURRENT INF: CPT

## 2020-07-15 PROCEDURE — 6360000002 HC RX W HCPCS

## 2020-07-15 PROCEDURE — 2580000003 HC RX 258: Performed by: INTERNAL MEDICINE

## 2020-07-15 PROCEDURE — 96413 CHEMO IV INFUSION 1 HR: CPT

## 2020-07-15 PROCEDURE — 96415 CHEMO IV INFUSION ADDL HR: CPT

## 2020-07-15 PROCEDURE — 6360000002 HC RX W HCPCS: Performed by: INTERNAL MEDICINE

## 2020-07-15 RX ORDER — PALONOSETRON 0.05 MG/ML
INJECTION, SOLUTION INTRAVENOUS
Status: DISPENSED
Start: 2020-07-15 | End: 2020-07-15

## 2020-07-15 RX ORDER — ATROPINE SULFATE 0.4 MG/ML
AMPUL (ML) INJECTION
Status: DISPENSED
Start: 2020-07-15 | End: 2020-07-15

## 2020-07-15 RX ORDER — PALONOSETRON 0.05 MG/ML
0.25 INJECTION, SOLUTION INTRAVENOUS ONCE
Status: DISCONTINUED | OUTPATIENT
Start: 2020-07-15 | End: 2020-07-16 | Stop reason: HOSPADM

## 2020-07-17 ENCOUNTER — HOSPITAL ENCOUNTER (OUTPATIENT)
Dept: INFUSION THERAPY | Age: 75
Discharge: HOME | End: 2020-07-17
Payer: MEDICARE

## 2020-07-17 PROCEDURE — 6360000002 HC RX W HCPCS

## 2020-07-17 PROCEDURE — 96523 IRRIG DRUG DELIVERY DEVICE: CPT

## 2020-07-17 RX ORDER — HEPARIN SODIUM (PORCINE) LOCK FLUSH IV SOLN 100 UNIT/ML 100 UNIT/ML
SOLUTION INTRAVENOUS
Status: DISCONTINUED
Start: 2020-07-17 | End: 2020-07-18 | Stop reason: HOSPADM

## 2020-07-24 RX ORDER — MEPERIDINE HYDROCHLORIDE 50 MG/ML
12.5 INJECTION INTRAMUSCULAR; INTRAVENOUS; SUBCUTANEOUS ONCE
Status: CANCELLED | OUTPATIENT
Start: 2020-09-01

## 2020-07-24 RX ORDER — EPINEPHRINE 1 MG/ML
0.3 INJECTION, SOLUTION, CONCENTRATE INTRAVENOUS PRN
Status: CANCELLED | OUTPATIENT
Start: 2020-09-01

## 2020-07-24 RX ORDER — METHYLPREDNISOLONE SODIUM SUCCINATE 125 MG/2ML
125 INJECTION, POWDER, LYOPHILIZED, FOR SOLUTION INTRAMUSCULAR; INTRAVENOUS ONCE
Status: CANCELLED | OUTPATIENT
Start: 2020-09-01

## 2020-07-24 RX ORDER — FLUOROURACIL 50 MG/ML
400 INJECTION, SOLUTION INTRAVENOUS ONCE
Status: CANCELLED | OUTPATIENT
Start: 2020-07-29

## 2020-07-24 RX ORDER — PALONOSETRON 0.05 MG/ML
0.25 INJECTION, SOLUTION INTRAVENOUS ONCE
Status: CANCELLED | OUTPATIENT
Start: 2020-08-12

## 2020-07-24 RX ORDER — EPINEPHRINE 1 MG/ML
0.3 INJECTION, SOLUTION, CONCENTRATE INTRAVENOUS PRN
Status: CANCELLED | OUTPATIENT
Start: 2020-08-12

## 2020-07-24 RX ORDER — PALONOSETRON 0.05 MG/ML
0.25 INJECTION, SOLUTION INTRAVENOUS ONCE
Status: CANCELLED | OUTPATIENT
Start: 2020-09-01

## 2020-07-24 RX ORDER — DEXTROSE MONOHYDRATE 50 MG/ML
INJECTION, SOLUTION INTRAVENOUS ONCE
Status: CANCELLED | OUTPATIENT
Start: 2020-08-12

## 2020-07-24 RX ORDER — SODIUM CHLORIDE 9 MG/ML
INJECTION, SOLUTION INTRAVENOUS CONTINUOUS
Status: CANCELLED | OUTPATIENT
Start: 2020-08-12

## 2020-07-24 RX ORDER — DIPHENHYDRAMINE HYDROCHLORIDE 50 MG/ML
50 INJECTION INTRAMUSCULAR; INTRAVENOUS ONCE
Status: CANCELLED | OUTPATIENT
Start: 2020-09-01

## 2020-07-24 RX ORDER — DEXTROSE MONOHYDRATE 50 MG/ML
INJECTION, SOLUTION INTRAVENOUS ONCE
Status: CANCELLED | OUTPATIENT
Start: 2020-09-01

## 2020-07-24 RX ORDER — MEPERIDINE HYDROCHLORIDE 50 MG/ML
12.5 INJECTION INTRAMUSCULAR; INTRAVENOUS; SUBCUTANEOUS ONCE
Status: CANCELLED | OUTPATIENT
Start: 2020-08-12

## 2020-07-24 RX ORDER — HEPARIN SODIUM (PORCINE) LOCK FLUSH IV SOLN 100 UNIT/ML 100 UNIT/ML
500 SOLUTION INTRAVENOUS PRN
Status: CANCELLED | OUTPATIENT
Start: 2020-09-01

## 2020-07-24 RX ORDER — METHYLPREDNISOLONE SODIUM SUCCINATE 125 MG/2ML
125 INJECTION, POWDER, LYOPHILIZED, FOR SOLUTION INTRAMUSCULAR; INTRAVENOUS ONCE
Status: CANCELLED | OUTPATIENT
Start: 2020-08-12

## 2020-07-24 RX ORDER — ATROPINE SULFATE 0.4 MG/ML
0.4 AMPUL (ML) INJECTION
Status: CANCELLED | OUTPATIENT
Start: 2020-09-01

## 2020-07-24 RX ORDER — SODIUM CHLORIDE 0.9 % (FLUSH) 0.9 %
10 SYRINGE (ML) INJECTION PRN
Status: CANCELLED | OUTPATIENT
Start: 2020-08-12

## 2020-07-24 RX ORDER — SODIUM CHLORIDE 9 MG/ML
INJECTION, SOLUTION INTRAVENOUS CONTINUOUS
Status: CANCELLED | OUTPATIENT
Start: 2020-09-01

## 2020-07-24 RX ORDER — DIPHENHYDRAMINE HYDROCHLORIDE 50 MG/ML
50 INJECTION INTRAMUSCULAR; INTRAVENOUS ONCE
Status: CANCELLED | OUTPATIENT
Start: 2020-08-12

## 2020-07-24 RX ORDER — SODIUM CHLORIDE 9 MG/ML
INJECTION, SOLUTION INTRAVENOUS ONCE
Status: CANCELLED | OUTPATIENT
Start: 2020-09-01

## 2020-07-24 RX ORDER — SODIUM CHLORIDE 0.9 % (FLUSH) 0.9 %
5 SYRINGE (ML) INJECTION PRN
Status: CANCELLED | OUTPATIENT
Start: 2020-09-01

## 2020-07-24 RX ORDER — FLUOROURACIL 50 MG/ML
400 INJECTION, SOLUTION INTRAVENOUS ONCE
Status: CANCELLED | OUTPATIENT
Start: 2020-08-12

## 2020-07-24 RX ORDER — HEPARIN SODIUM (PORCINE) LOCK FLUSH IV SOLN 100 UNIT/ML 100 UNIT/ML
500 SOLUTION INTRAVENOUS PRN
Status: CANCELLED | OUTPATIENT
Start: 2020-08-12

## 2020-07-24 RX ORDER — SODIUM CHLORIDE 0.9 % (FLUSH) 0.9 %
5 SYRINGE (ML) INJECTION PRN
Status: CANCELLED | OUTPATIENT
Start: 2020-08-12

## 2020-07-24 RX ORDER — SODIUM CHLORIDE 9 MG/ML
INJECTION, SOLUTION INTRAVENOUS ONCE
Status: CANCELLED | OUTPATIENT
Start: 2020-08-12

## 2020-07-24 RX ORDER — SODIUM CHLORIDE 0.9 % (FLUSH) 0.9 %
10 SYRINGE (ML) INJECTION PRN
Status: CANCELLED | OUTPATIENT
Start: 2020-09-01

## 2020-07-24 RX ORDER — ATROPINE SULFATE 0.4 MG/ML
0.4 AMPUL (ML) INJECTION
Status: CANCELLED | OUTPATIENT
Start: 2020-08-12

## 2020-07-28 ENCOUNTER — HOSPITAL ENCOUNTER (OUTPATIENT)
Dept: INFUSION THERAPY | Age: 75
Discharge: HOME | End: 2020-07-28
Payer: MEDICARE

## 2020-07-28 LAB
ALBUMIN SERPL-MCNC: 3.6 GM/DL (ref 3.4–5)
ALP BLD-CCNC: 352 IU/L (ref 40–128)
ALT SERPL-CCNC: 19 U/L (ref 10–40)
ANION GAP SERPL CALCULATED.3IONS-SCNC: 12 MMOL/L (ref 4–16)
AST SERPL-CCNC: 27 IU/L (ref 15–37)
BASOPHILS ABSOLUTE: 0 K/CU MM
BASOPHILS RELATIVE PERCENT: 0.6 % (ref 0–1)
BILIRUB SERPL-MCNC: 0.6 MG/DL (ref 0–1)
BUN BLDV-MCNC: 18 MG/DL (ref 6–23)
CALCIUM SERPL-MCNC: 9.2 MG/DL (ref 8.3–10.6)
CHLORIDE BLD-SCNC: 98 MMOL/L (ref 99–110)
CO2: 28 MMOL/L (ref 21–32)
CREAT SERPL-MCNC: 1.3 MG/DL (ref 0.6–1.1)
DIFFERENTIAL TYPE: ABNORMAL
EOSINOPHILS ABSOLUTE: 0 K/CU MM
EOSINOPHILS RELATIVE PERCENT: 1.2 % (ref 0–3)
GFR AFRICAN AMERICAN: 49 ML/MIN/1.73M2
GFR AFRICAN AMERICAN: 52 ML/MIN/1.73M2
GFR NON-AFRICAN AMERICAN: 40 ML/MIN/1.73M2
GFR NON-AFRICAN AMERICAN: 43 ML/MIN/1.73M2
GLUCOSE BLD-MCNC: 160 MG/DL (ref 70–99)
GLUCOSE BLD-MCNC: 170 MG/DL (ref 70–99)
HCT VFR BLD CALC: 27.7 % (ref 37–47)
HEMOGLOBIN: 9.1 GM/DL (ref 12.5–16)
LYMPHOCYTES ABSOLUTE: 0.7 K/CU MM
LYMPHOCYTES RELATIVE PERCENT: 40.8 % (ref 24–44)
MCH RBC QN AUTO: 29.5 PG (ref 27–31)
MCHC RBC AUTO-ENTMCNC: 32.9 % (ref 32–36)
MCV RBC AUTO: 89.9 FL (ref 78–100)
MONOCYTES ABSOLUTE: 0.6 K/CU MM
MONOCYTES RELATIVE PERCENT: 36.7 % (ref 0–4)
PDW BLD-RTO: 14.3 % (ref 11.7–14.9)
PLATELET # BLD: 149 K/CU MM (ref 140–440)
PMV BLD AUTO: 10.5 FL (ref 7.5–11.1)
POC CALCIUM: 1.15 MMOL/L (ref 1.12–1.32)
POC CHLORIDE: 102 MMOL/L (ref 98–109)
POC CREATININE: 1.2 MG/DL (ref 0.6–1.1)
POTASSIUM SERPL-SCNC: 3.4 MMOL/L (ref 3.5–5.1)
POTASSIUM SERPL-SCNC: 3.4 MMOL/L (ref 3.6–5.1)
RBC # BLD: 3.08 M/CU MM (ref 4.2–5.4)
SEGMENTED NEUTROPHILS ABSOLUTE COUNT: 0.4 K/CU MM
SEGMENTED NEUTROPHILS RELATIVE PERCENT: 20.7 % (ref 36–66)
SODIUM BLD-SCNC: 138 MMOL/L (ref 135–145)
SODIUM BLD-SCNC: 138 MMOL/L (ref 136–145)
TOTAL PROTEIN: 6.9 GM/DL (ref 6.4–8.2)
WBC # BLD: 1.7 K/CU MM (ref 4–10.5)

## 2020-07-28 PROCEDURE — 80053 COMPREHEN METABOLIC PANEL: CPT

## 2020-07-28 PROCEDURE — 36415 COLL VENOUS BLD VENIPUNCTURE: CPT

## 2020-07-28 PROCEDURE — 85025 COMPLETE CBC W/AUTO DIFF WBC: CPT

## 2020-07-28 PROCEDURE — 99211 OFF/OP EST MAY X REQ PHY/QHP: CPT

## 2020-07-31 PROBLEM — T45.1X5A ANEMIA DUE TO ANTINEOPLASTIC CHEMOTHERAPY: Status: ACTIVE | Noted: 2020-07-31

## 2020-07-31 PROBLEM — D70.2 OTHER DRUG-INDUCED AGRANULOCYTOSIS (HCC): Status: ACTIVE | Noted: 2020-07-31

## 2020-07-31 PROBLEM — D64.81 ANEMIA DUE TO ANTINEOPLASTIC CHEMOTHERAPY: Status: ACTIVE | Noted: 2020-07-31

## 2020-08-09 NOTE — PROGRESS NOTES
She was subsequently evaluated by Dr. Sarita Avina and she underwent exploratory laparotomy, sigmoid colon resection with end colostomy, bladder dome resection and closure, segment of small bowel resection, on October 7, 2017. Final pathology revealed low-grade invasive adenocarcinoma. Chronic inflammation and fibrosis with focal abscess associated with invasive adenocarcinoma. Small segment of small intestine revealed mild chronic nonspecific mucosal enteritis. Tumor size was 4.3 x 4.5 cm in diameter and all the surgical margins were negative. Lymphovascular invasion and perineural invasion were present. Eight out of 11 lymph nodes examined were positive for metastasis (pT3 pN2b pMx). Her CEA level on October 8, 2017, was 6.5 ng/mL. She was discharged from the hospital with an appointment to see me as an outpatient. After a long discussion with Ms. Rodney Viera, she has decided to have adjuvant chemotherapy with FOLFOX for her stage IIIC colon cancer. Adjuvant chemotherapy with FOLFOX was started on November 28, 2017 and we stopped it on April 24, 2018 (total 10 treatments), after she experienced syncope attack after last treatment. We decided to follow her closely. She underwent colonoscopy by Dr. Sarita Avina on January 24, 2018 and it was a normal study according to her. She has CT scan of the chest, abdomen and pelvis on October 15, 2018 and it showed postsurgical changes from partial colectomy with left lower quadrant colostomy. Bilateral 2-6 mm pulmonary nodules, new since 2014, one of which has slightly increased in size since 2017 in the left upper lobe. These are suspicious for metastatic disease. Grossly stable 1.2 cm hypoenhancing lesion in segment VII of liver which was less conspicuous on the prior exam. Given stability, findings favor benign process. No new findings of metastatic disease in the abdomen or pelvis. Cholelithiasis.     CT scan of the chest done on 4/10/19 showed increased size and number bilateral pulmonary metastases. At least 4 liver lesions as above, at least 1 appearing unchanged since 07/28/2014. Metastatic disease, primary malignancy, and benign processes such as cysts and hemangioma should be considered. Recommend further evaluation with liver protocol abdomen MRI (using a nonhepatobiliary contrast agent) or CT. She underwent CT guided biopsy of the lung nodules on 4/24/19 and final pathology showed metastatic adenocarcinoma, consistent with a colorectal primary. KRAS, NRAS, BRAF study were negative. MSI reveals stable disease. Since she is found to have metastatic colon cancer, we started first line chemotherapy with FOLFOX and panitumumab since 5/22/19. She admitted to the hospital on September 22, 2019 and released from the hospital on September 24, 2019. She was diagnosed with C. difficile colitis and required treatment at that time. CT scan of the chest done on May 27, 2020 showed mixed response of pulmonary metastases, with decrease in size of the largest pulmonary nodules, although, there has been development of new subcentimeter pulmonary nodules in both lungs. Worsening hepatic metastatic disease, with new lesions measuring up to 1.8 cm in the liver dome. On July 28, 2020, she presented to me for follow up. I have been following Ms. Fabricio Baldwin for stage IIIC low-grade invasive sigmoid colon adenocarcinoma and she is status post surgery followed by adjuvant chemotherapy with FOLFOX. CT scan on 4/10/19 showed increase in size and numbers of bilateral pulmonary nodules. CEA done the same day also showed increasing (52.9). She subsequently underwent CT guided biopsy of lung nodules on 4/24/19 and final pathology showed metastatic adenocarcinoma consistent with colorectal primary. All MELISSA study, BRAF were negative and MSI studies showed she has MSI stable disease.  Since she is found to have metastatic colon cancer, we started first line chemotherapy with FOLFOX and panitumumab since 5/22/19. We stopped it on July 1, 2020 since she is found to have progression of the disease. She was found to have CT scan evidence of progression of the disease as well as rising CEA level. Since she has progression of the disease, I recommend her to change her chemotherapy to second chemotherapy with FOLFIRI and Panitumumab. It was started on July 15, 2020. She could not tolerate current dose of irinotecan very well. She developed significant oral mucositis. She also developed neutropenia as well. I recommended to use Magic mouthwash on a regular basis. I will delay chemotherapy for about 2 weeks and I will adjust the dose of irinotecan starting from her next treatment. Besides mucositis, she does not have any other significant symptoms at today's visit. Previous Therapies:  FOLFOX     Current Therapy:  FOLFIRINOX + panitumumab     Interval History:  Presents for scheduled follow up. C2 was delayed due to neutopenia. She had significant mucositis at that time which has since resolved. She reports she is now able to eat and drink well. She denies fever or infectious symptoms. She denies pain, no nausea, vomiting, no melena, hematochezia, no diarrhea or constipation. She denies lower extremity edema or calf pain. Reports her energy has improved over the past few days. Review of Systems: \"Per interval history; otherwise 10 point ROS is negative. \"     Vital Signs:  /74 (Site: Right Upper Arm, Position: Sitting, Cuff Size: Medium Adult)   Pulse 82   Temp (!) 60.8 °F (16 °C) (Tympanic)   Resp 16   Ht 5' 5\" (1.651 m)   Wt 136 lb 6.4 oz (61.9 kg)   SpO2 94%   BMI 22.70 kg/m²     Physical Exam:    CONSTITUTIONAL: awake, alert, cooperative, no apparent distress   EYES: pupils equal, round and reactive to light, sclera clear and conjunctiva normal  ENT: Normocephalic, without obvious abnormality, atraumatic  NECK: supple, symmetrical, no jugular venous distension and no carotid bruits   HEMATOLOGIC/LYMPHATIC: no cervical, supraclavicular or axillary lymphadenopathy   LUNGS: no increased work of breathing and clear to auscultation   CARDIOVASCULAR: regular rate and rhythm, normal S1 and S2, no murmur noted  ABDOMEN: normal bowel sounds x 4, soft, non-distended, non-tender, no masses palpated, no hepatosplenomegaly   MUSCULOSKELETAL: full range of motion noted, tone is normal  NEUROLOGIC: awake, alert, oriented to name, place and time. Motor skills grossly intact. SKIN: Normal skin color, texture, turgor and no jaundice.  appears intact   EXTREMITIES: no LE edema     Labs:    Hematology:  Lab Results   Component Value Date    WBC 6.7 08/11/2020    RBC 3.28 (L) 08/11/2020    HGB 9.7 (L) 08/11/2020    HCT 30.8 (L) 08/11/2020    MCV 93.9 08/11/2020    MCH 29.6 08/11/2020    MCHC 31.5 (L) 08/11/2020    RDW 16.0 (H) 08/11/2020     08/11/2020    MPV 11.0 08/11/2020    BANDSPCT 13 (H) 10/08/2017    SEGSPCT 66.2 (H) 08/11/2020    EOSRELPCT 0.3 08/11/2020    BASOPCT 0.7 08/11/2020    LYMPHOPCT 15.0 (L) 08/11/2020    MONOPCT 17.8 (H) 08/11/2020    BANDABS 0.56 10/08/2017    SEGSABS 4.4 08/11/2020    EOSABS 0.0 08/11/2020    BASOSABS 0.1 08/11/2020    LYMPHSABS 1.0 08/11/2020    MONOSABS 1.2 08/11/2020    DIFFTYPE AUTOMATED DIFFERENTIAL 08/11/2020     No results found for: ESR    Chemistry:  Lab Results   Component Value Date     07/28/2020    K 3.4 (L) 07/28/2020    CL 98 (L) 07/28/2020    CO2 28 07/28/2020    BUN 18 07/28/2020    CREATININE 1.3 (H) 07/28/2020    GLUCOSE 160 (H) 07/28/2020    CALCIUM 9.2 07/28/2020    PROT 6.9 07/28/2020    LABALBU 3.6 07/28/2020    BILITOT 0.6 07/28/2020    ALKPHOS 352 (H) 07/28/2020    AST 27 07/28/2020    ALT 19 07/28/2020    LABGLOM 40 (L) 07/28/2020    GFRAA 49 (L) 07/28/2020    PHOS 4.7 11/12/2019    MG 2.4 01/06/2020    POCCA 1.15 07/28/2020    POCGLU 170 (H) 07/28/2020     No results found for: MMA, LDH, HOMOCYSTEINE  No components found for: LD  No results found for: TSHHS, T4FREE, FT3    Immunology:  Lab Results   Component Value Date    PROT 6.9 07/28/2020     No results found for: Jaimie Alcaraz, KLFLCR  No results found for: B2M    Coagulation Panel:  Lab Results   Component Value Date    PROTIME 11.6 (L) 02/24/2020    INR 1.02 02/24/2020    APTT 19.3 (L) 02/24/2020       Anemia Panel:  Lab Results   Component Value Date    WXGOWDUS96 306.6 02/03/2020    FOLATE >20.0 (H) 02/03/2020       Tumor Markers:  Lab Results   Component Value Date    CEA 50.1 07/14/2020     Observations:  Performance Status: ECOG 1  Cognitive Status: alert, oriented to person, place and time. Assessment & Plan:    Colon cancer: S/P C1 FOLFIRINOX + panitumumab. She developed significant neutropenia and mucositis after cycle one. Treatment was delayed by two weeks. The dose of irinotecan was reduced to 160 mg starting at [address]. CBC was reviewed, with neutropenia having resolved. Mucositis: significantly improved; continue magic mouthwash as needed    Fatigue- encouraged adequate nutrtion and hydration. She is asked to start using nutritional supplementation like rito or ensure. Return in about 4 weeks (around 9/8/2020). ZB  Patient was instructed to stop at our  to make their next appointment before leaving. They will call in the interim with any questions/concerns/new symptoms. This plan was discussed with the patient and they verbalized understanding and acceptance of the plan. Dr. Sanjiv Hamm was available consultation for this visit. I have recommended that the patient follow CDC guidelines for prevention of COVID-19 infection. Recent imaging and labs were reviewed and discussed with the patient.       Electronically signed by Rebbeca Nissen, APRN - CNP on 8/9/20 at 11:53 AM EDT

## 2020-08-11 ENCOUNTER — HOSPITAL ENCOUNTER (OUTPATIENT)
Dept: INFUSION THERAPY | Age: 75
Discharge: HOME | End: 2020-08-11
Payer: MEDICARE

## 2020-08-11 ENCOUNTER — OFFICE VISIT (OUTPATIENT)
Dept: ONCOLOGY | Age: 75
End: 2020-08-11
Payer: MEDICARE

## 2020-08-11 VITALS
HEART RATE: 82 BPM | OXYGEN SATURATION: 94 % | BODY MASS INDEX: 22.73 KG/M2 | SYSTOLIC BLOOD PRESSURE: 115 MMHG | WEIGHT: 136.4 LBS | TEMPERATURE: 60.8 F | HEIGHT: 65 IN | DIASTOLIC BLOOD PRESSURE: 74 MMHG | RESPIRATION RATE: 16 BRPM

## 2020-08-11 DIAGNOSIS — C18.9 COLON CANCER METASTASIZED TO LUNG (HCC): ICD-10-CM

## 2020-08-11 DIAGNOSIS — C78.00 COLON CANCER METASTASIZED TO LUNG (HCC): ICD-10-CM

## 2020-08-11 LAB
ALBUMIN SERPL-MCNC: 3.5 GM/DL (ref 3.4–5)
ALP BLD-CCNC: 420 IU/L (ref 40–129)
ALT SERPL-CCNC: 22 U/L (ref 10–40)
ANION GAP SERPL CALCULATED.3IONS-SCNC: 10 MMOL/L (ref 4–16)
AST SERPL-CCNC: 48 IU/L (ref 15–37)
BASOPHILS ABSOLUTE: 0.1 K/CU MM
BASOPHILS RELATIVE PERCENT: 0.7 % (ref 0–1)
BILIRUB SERPL-MCNC: 0.6 MG/DL (ref 0–1)
BUN BLDV-MCNC: 11 MG/DL (ref 6–23)
CALCIUM SERPL-MCNC: 9.3 MG/DL (ref 8.3–10.6)
CHLORIDE BLD-SCNC: 103 MMOL/L (ref 99–110)
CO2: 26 MMOL/L (ref 21–32)
CREAT SERPL-MCNC: 1 MG/DL (ref 0.6–1.1)
DIFFERENTIAL TYPE: ABNORMAL
EOSINOPHILS ABSOLUTE: 0 K/CU MM
EOSINOPHILS RELATIVE PERCENT: 0.3 % (ref 0–3)
GFR AFRICAN AMERICAN: >60 ML/MIN/1.73M2
GFR NON-AFRICAN AMERICAN: 54 ML/MIN/1.73M2
GLUCOSE BLD-MCNC: 142 MG/DL (ref 70–99)
HCT VFR BLD CALC: 30.8 % (ref 37–47)
HEMOGLOBIN: 9.7 GM/DL (ref 12.5–16)
LYMPHOCYTES ABSOLUTE: 1 K/CU MM
LYMPHOCYTES RELATIVE PERCENT: 15 % (ref 24–44)
MCH RBC QN AUTO: 29.6 PG (ref 27–31)
MCHC RBC AUTO-ENTMCNC: 31.5 % (ref 32–36)
MCV RBC AUTO: 93.9 FL (ref 78–100)
MONOCYTES ABSOLUTE: 1.2 K/CU MM
MONOCYTES RELATIVE PERCENT: 17.8 % (ref 0–4)
PDW BLD-RTO: 16 % (ref 11.7–14.9)
PLATELET # BLD: 216 K/CU MM (ref 140–440)
PMV BLD AUTO: 11 FL (ref 7.5–11.1)
POTASSIUM SERPL-SCNC: 3.8 MMOL/L (ref 3.5–5.1)
RBC # BLD: 3.28 M/CU MM (ref 4.2–5.4)
SEGMENTED NEUTROPHILS ABSOLUTE COUNT: 4.4 K/CU MM
SEGMENTED NEUTROPHILS RELATIVE PERCENT: 66.2 % (ref 36–66)
SODIUM BLD-SCNC: 139 MMOL/L (ref 135–145)
TOTAL PROTEIN: 6.6 GM/DL (ref 6.4–8.2)
WBC # BLD: 6.7 K/CU MM (ref 4–10.5)

## 2020-08-11 PROCEDURE — G8400 PT W/DXA NO RESULTS DOC: HCPCS | Performed by: NURSE PRACTITIONER

## 2020-08-11 PROCEDURE — 1123F ACP DISCUSS/DSCN MKR DOCD: CPT | Performed by: NURSE PRACTITIONER

## 2020-08-11 PROCEDURE — 3017F COLORECTAL CA SCREEN DOC REV: CPT | Performed by: NURSE PRACTITIONER

## 2020-08-11 PROCEDURE — 1090F PRES/ABSN URINE INCON ASSESS: CPT | Performed by: NURSE PRACTITIONER

## 2020-08-11 PROCEDURE — 99213 OFFICE O/P EST LOW 20 MIN: CPT | Performed by: NURSE PRACTITIONER

## 2020-08-11 PROCEDURE — G8420 CALC BMI NORM PARAMETERS: HCPCS | Performed by: NURSE PRACTITIONER

## 2020-08-11 PROCEDURE — 4040F PNEUMOC VAC/ADMIN/RCVD: CPT | Performed by: NURSE PRACTITIONER

## 2020-08-11 PROCEDURE — 80053 COMPREHEN METABOLIC PANEL: CPT

## 2020-08-11 PROCEDURE — 85025 COMPLETE CBC W/AUTO DIFF WBC: CPT

## 2020-08-11 PROCEDURE — 99211 OFF/OP EST MAY X REQ PHY/QHP: CPT

## 2020-08-11 PROCEDURE — 1036F TOBACCO NON-USER: CPT | Performed by: NURSE PRACTITIONER

## 2020-08-11 PROCEDURE — G8427 DOCREV CUR MEDS BY ELIG CLIN: HCPCS | Performed by: NURSE PRACTITIONER

## 2020-08-11 PROCEDURE — 36415 COLL VENOUS BLD VENIPUNCTURE: CPT

## 2020-08-11 ASSESSMENT — PATIENT HEALTH QUESTIONNAIRE - PHQ9
1. LITTLE INTEREST OR PLEASURE IN DOING THINGS: 2
SUM OF ALL RESPONSES TO PHQ9 QUESTIONS 1 & 2: 2
SUM OF ALL RESPONSES TO PHQ QUESTIONS 1-9: 2
2. FEELING DOWN, DEPRESSED OR HOPELESS: 0
SUM OF ALL RESPONSES TO PHQ QUESTIONS 1-9: 2

## 2020-08-11 NOTE — PROGRESS NOTES
MA Rooming Questions  Patient: Cassi Arambula  MRN: [id number]    Date: 8/11/2020        1. Do you have any new issues? yes - discuss leveling out chemo or discontinuing, she was not able to eat or talk about 4 days after treatment         2. Do you need any refills on medications?    no    3. Have you had any imaging done since your last visit?   no    4. Have you been hospitalized or seen in the emergency room since your last visit here?   no    5. Did the patient have a PHQ-9 collected today? Yes  PHQ-9 Total Score: 2 (8/11/2020 10:20 AM)      PHQ-9 Given to: Nia Butt CNP              PHQ-9 Score:                     [] Positive     [x]  Negative              Does question #9 need addressed?                      [] Yes    []  No               Silvino Ribeiro MA

## 2020-08-12 ENCOUNTER — HOSPITAL ENCOUNTER (OUTPATIENT)
Dept: INFUSION THERAPY | Age: 75
Discharge: HOME | End: 2020-08-12
Payer: MEDICARE

## 2020-08-12 VITALS
HEART RATE: 84 BPM | HEIGHT: 65 IN | RESPIRATION RATE: 16 BRPM | DIASTOLIC BLOOD PRESSURE: 69 MMHG | BODY MASS INDEX: 22.66 KG/M2 | SYSTOLIC BLOOD PRESSURE: 151 MMHG | OXYGEN SATURATION: 97 % | TEMPERATURE: 97.1 F | WEIGHT: 136 LBS

## 2020-08-12 DIAGNOSIS — C18.7 MALIGNANT NEOPLASM OF SIGMOID COLON (HCC): ICD-10-CM

## 2020-08-12 DIAGNOSIS — C18.9 COLON CANCER METASTASIZED TO LUNG (HCC): Primary | ICD-10-CM

## 2020-08-12 DIAGNOSIS — C78.00 COLON CANCER METASTASIZED TO LUNG (HCC): Primary | ICD-10-CM

## 2020-08-12 PROCEDURE — 96411 CHEMO IV PUSH ADDL DRUG: CPT

## 2020-08-12 PROCEDURE — 2580000003 HC RX 258: Performed by: INTERNAL MEDICINE

## 2020-08-12 PROCEDURE — 96375 TX/PRO/DX INJ NEW DRUG ADDON: CPT

## 2020-08-12 PROCEDURE — 6360000002 HC RX W HCPCS

## 2020-08-12 PROCEDURE — 96415 CHEMO IV INFUSION ADDL HR: CPT

## 2020-08-12 PROCEDURE — 96413 CHEMO IV INFUSION 1 HR: CPT

## 2020-08-12 PROCEDURE — 96368 THER/DIAG CONCURRENT INF: CPT

## 2020-08-12 PROCEDURE — 6360000002 HC RX W HCPCS: Performed by: INTERNAL MEDICINE

## 2020-08-12 PROCEDURE — 96367 TX/PROPH/DG ADDL SEQ IV INF: CPT

## 2020-08-12 PROCEDURE — 2580000003 HC RX 258

## 2020-08-12 RX ORDER — SODIUM CHLORIDE 9 MG/ML
INJECTION, SOLUTION INTRAVENOUS ONCE
Status: DISCONTINUED | OUTPATIENT
Start: 2020-08-12 | End: 2020-08-13 | Stop reason: HOSPADM

## 2020-08-12 RX ORDER — SODIUM CHLORIDE 0.9 % (FLUSH) 0.9 %
20 SYRINGE (ML) INJECTION PRN
Status: CANCELLED | OUTPATIENT
Start: 2020-08-14

## 2020-08-12 RX ORDER — SODIUM CHLORIDE 0.9 % (FLUSH) 0.9 %
10 SYRINGE (ML) INJECTION PRN
Status: CANCELLED | OUTPATIENT
Start: 2020-08-14

## 2020-08-12 RX ORDER — ATROPINE SULFATE 0.4 MG/ML
0.4 AMPUL (ML) INJECTION
Status: COMPLETED | OUTPATIENT
Start: 2020-08-12 | End: 2020-08-12

## 2020-08-12 RX ORDER — HEPARIN SODIUM (PORCINE) LOCK FLUSH IV SOLN 100 UNIT/ML 100 UNIT/ML
500 SOLUTION INTRAVENOUS PRN
Status: CANCELLED | OUTPATIENT
Start: 2020-08-14

## 2020-08-12 RX ORDER — SODIUM CHLORIDE 0.9 % (FLUSH) 0.9 %
10 SYRINGE (ML) INJECTION PRN
Status: DISCONTINUED | OUTPATIENT
Start: 2020-08-12 | End: 2020-08-13 | Stop reason: HOSPADM

## 2020-08-12 RX ORDER — PALONOSETRON 0.05 MG/ML
0.25 INJECTION, SOLUTION INTRAVENOUS ONCE
Status: COMPLETED | OUTPATIENT
Start: 2020-08-12 | End: 2020-08-12

## 2020-08-12 RX ADMIN — FLUOROURACIL 600 MG: 50 INJECTION, SOLUTION INTRAVENOUS at 12:33

## 2020-08-12 RX ADMIN — SODIUM CHLORIDE: 9 INJECTION, SOLUTION INTRAVENOUS at 09:11

## 2020-08-12 RX ADMIN — ATROPINE SULFATE 0.4 MG: 0.4 INJECTION, SOLUTION INTRAMUSCULAR; INTRAVENOUS; SUBCUTANEOUS at 09:22

## 2020-08-12 RX ADMIN — LEUCOVORIN CALCIUM 200 MG: 200 INJECTION, POWDER, LYOPHILIZED, FOR SUSPENSION INTRAMUSCULAR; INTRAVENOUS at 10:55

## 2020-08-12 RX ADMIN — DEXAMETHASONE SODIUM PHOSPHATE 12 MG: 4 INJECTION, SOLUTION INTRAMUSCULAR; INTRAVENOUS at 09:48

## 2020-08-12 RX ADMIN — SODIUM CHLORIDE, PRESERVATIVE FREE 10 ML: 5 INJECTION INTRAVENOUS at 12:48

## 2020-08-12 RX ADMIN — DEXTROSE MONOHYDRATE 160 MG: 50 INJECTION, SOLUTION INTRAVENOUS at 10:55

## 2020-08-12 RX ADMIN — PANITUMUMAB 390 MG: 100 SOLUTION INTRAVENOUS at 10:22

## 2020-08-12 RX ADMIN — PALONOSETRON 0.25 MG: 0.25 INJECTION, SOLUTION INTRAVENOUS at 09:21

## 2020-08-12 NOTE — ONCOLOGY
Pt here for treatment today, A&OX3. No new issues to report. Pt received tx as ordered. Port left accessed, 5FU pump hooked up by covering treatment RN today while I was off the floor. Pt to return in 2 days for pump off    Amerimed pump hooked up, rate 5ml/hr X 46 hrs.     Pt given a copy of her after visit summary report and return appt dates

## 2020-08-14 ENCOUNTER — HOSPITAL ENCOUNTER (OUTPATIENT)
Dept: INFUSION THERAPY | Age: 75
Discharge: HOME | End: 2020-08-14
Payer: MEDICARE

## 2020-08-14 DIAGNOSIS — C78.00 COLON CANCER METASTASIZED TO LUNG (HCC): Primary | ICD-10-CM

## 2020-08-14 DIAGNOSIS — C18.9 COLON CANCER METASTASIZED TO LUNG (HCC): Primary | ICD-10-CM

## 2020-08-14 PROCEDURE — 6360000002 HC RX W HCPCS: Performed by: INTERNAL MEDICINE

## 2020-08-14 PROCEDURE — 96523 IRRIG DRUG DELIVERY DEVICE: CPT

## 2020-08-14 PROCEDURE — 2580000003 HC RX 258: Performed by: INTERNAL MEDICINE

## 2020-08-14 RX ORDER — HEPARIN SODIUM (PORCINE) LOCK FLUSH IV SOLN 100 UNIT/ML 100 UNIT/ML
500 SOLUTION INTRAVENOUS PRN
Status: DISCONTINUED | OUTPATIENT
Start: 2020-08-14 | End: 2020-08-15 | Stop reason: HOSPADM

## 2020-08-14 RX ORDER — SODIUM CHLORIDE 0.9 % (FLUSH) 0.9 %
10 SYRINGE (ML) INJECTION PRN
Status: DISCONTINUED | OUTPATIENT
Start: 2020-08-14 | End: 2020-08-15 | Stop reason: HOSPADM

## 2020-08-14 RX ORDER — SODIUM CHLORIDE 0.9 % (FLUSH) 0.9 %
10 SYRINGE (ML) INJECTION PRN
Status: CANCELLED | OUTPATIENT
Start: 2020-08-14

## 2020-08-14 RX ORDER — HEPARIN SODIUM (PORCINE) LOCK FLUSH IV SOLN 100 UNIT/ML 100 UNIT/ML
500 SOLUTION INTRAVENOUS PRN
Status: CANCELLED | OUTPATIENT
Start: 2020-08-14

## 2020-08-14 RX ORDER — SODIUM CHLORIDE 0.9 % (FLUSH) 0.9 %
20 SYRINGE (ML) INJECTION PRN
Status: CANCELLED | OUTPATIENT
Start: 2020-08-14

## 2020-08-14 RX ADMIN — SODIUM CHLORIDE, PRESERVATIVE FREE 10 ML: 5 INJECTION INTRAVENOUS at 12:16

## 2020-08-14 RX ADMIN — Medication 500 UNITS: at 12:16

## 2020-08-14 NOTE — ONCOLOGY
Pt here for pump disconnect today A&OX3. No new issues. Port flushed with 10ml NS and heparin flush and deaccessed, bandaide to site.      Pump returned to treatment room storage cabinent

## 2020-08-25 ENCOUNTER — HOSPITAL ENCOUNTER (OUTPATIENT)
Dept: INFUSION THERAPY | Age: 75
Discharge: HOME | End: 2020-08-25
Payer: MEDICARE

## 2020-08-25 DIAGNOSIS — C78.00 COLON CANCER METASTASIZED TO LUNG (HCC): ICD-10-CM

## 2020-08-25 DIAGNOSIS — C18.9 COLON CANCER METASTASIZED TO LUNG (HCC): ICD-10-CM

## 2020-08-25 LAB
ALBUMIN SERPL-MCNC: 3.9 GM/DL (ref 3.4–5)
ALP BLD-CCNC: 432 IU/L (ref 40–128)
ALT SERPL-CCNC: 22 U/L (ref 10–40)
ANION GAP SERPL CALCULATED.3IONS-SCNC: 10 MMOL/L (ref 4–16)
AST SERPL-CCNC: 40 IU/L (ref 15–37)
BASOPHILS ABSOLUTE: 0 K/CU MM
BASOPHILS RELATIVE PERCENT: 1.2 % (ref 0–1)
BILIRUB SERPL-MCNC: 0.6 MG/DL (ref 0–1)
BUN BLDV-MCNC: 10 MG/DL (ref 6–23)
CALCIUM SERPL-MCNC: 9.7 MG/DL (ref 8.3–10.6)
CHLORIDE BLD-SCNC: 104 MMOL/L (ref 99–110)
CO2: 28 MMOL/L (ref 21–32)
CREAT SERPL-MCNC: 1.1 MG/DL (ref 0.6–1.1)
DIFFERENTIAL TYPE: ABNORMAL
EOSINOPHILS ABSOLUTE: 0.2 K/CU MM
EOSINOPHILS RELATIVE PERCENT: 5.8 % (ref 0–3)
GFR AFRICAN AMERICAN: 57 ML/MIN/1.73M2
GFR AFRICAN AMERICAN: 59 ML/MIN/1.73M2
GFR NON-AFRICAN AMERICAN: 47 ML/MIN/1.73M2
GFR NON-AFRICAN AMERICAN: 49 ML/MIN/1.73M2
GLUCOSE BLD-MCNC: 165 MG/DL (ref 70–99)
GLUCOSE BLD-MCNC: 167 MG/DL (ref 70–99)
HCT VFR BLD CALC: 30.9 % (ref 37–47)
HEMOGLOBIN: 9.8 GM/DL (ref 12.5–16)
LYMPHOCYTES ABSOLUTE: 0.9 K/CU MM
LYMPHOCYTES RELATIVE PERCENT: 36 % (ref 24–44)
MCH RBC QN AUTO: 29.3 PG (ref 27–31)
MCHC RBC AUTO-ENTMCNC: 31.7 % (ref 32–36)
MCV RBC AUTO: 92.2 FL (ref 78–100)
MONOCYTES ABSOLUTE: 0.5 K/CU MM
MONOCYTES RELATIVE PERCENT: 18.2 % (ref 0–4)
PDW BLD-RTO: 14.7 % (ref 11.7–14.9)
PLATELET # BLD: 174 K/CU MM (ref 140–440)
PMV BLD AUTO: 10.7 FL (ref 7.5–11.1)
POC CALCIUM: 1.2 MMOL/L (ref 1.12–1.32)
POC CHLORIDE: 107 MMOL/L (ref 98–109)
POC CREATININE: 1.1 MG/DL (ref 0.6–1.1)
POTASSIUM SERPL-SCNC: 3.6 MMOL/L (ref 3.6–5.1)
POTASSIUM SERPL-SCNC: 3.8 MMOL/L (ref 3.5–5.1)
RBC # BLD: 3.35 M/CU MM (ref 4.2–5.4)
SEGMENTED NEUTROPHILS ABSOLUTE COUNT: 1 K/CU MM
SEGMENTED NEUTROPHILS RELATIVE PERCENT: 38.8 % (ref 36–66)
SODIUM BLD-SCNC: 142 MMOL/L (ref 135–145)
SODIUM BLD-SCNC: 146 MMOL/L (ref 136–145)
TOTAL PROTEIN: 6.9 GM/DL (ref 6.4–8.2)
WBC # BLD: 2.6 K/CU MM (ref 4–10.5)

## 2020-08-25 PROCEDURE — 85025 COMPLETE CBC W/AUTO DIFF WBC: CPT

## 2020-08-25 PROCEDURE — 36415 COLL VENOUS BLD VENIPUNCTURE: CPT

## 2020-08-25 PROCEDURE — 80053 COMPREHEN METABOLIC PANEL: CPT

## 2020-08-28 ENCOUNTER — APPOINTMENT (OUTPATIENT)
Dept: INFUSION THERAPY | Age: 75
End: 2020-08-28
Payer: MEDICARE

## 2020-08-30 NOTE — PROGRESS NOTES
invasion were present. Eight out of 11 lymph nodes examined were positive for metastasis (pT3 pN2b pMx). Her CEA level on October 8, 2017, was 6.5 ng/mL. She was discharged from the hospital with an appointment to see me as an outpatient. After a long discussion with Ms. Cody Cash, she has decided to have adjuvant chemotherapy with FOLFOX for her stage IIIC colon cancer. Adjuvant chemotherapy with FOLFOX was started on November 28, 2017 and we stopped it on April 24, 2018 (total 10 treatments), after she experienced syncope attack after last treatment. We decided to follow her closely. She underwent colonoscopy by Dr. Vigil on January 24, 2018 and it was a normal study according to her. She has CT scan of the chest, abdomen and pelvis on October 15, 2018 and it showed postsurgical changes from partial colectomy with left lower quadrant colostomy. Bilateral 2-6 mm pulmonary nodules, new since 2014, one of which has slightly increased in size since 2017 in the left upper lobe. These are suspicious for metastatic disease. Grossly stable 1.2 cm hypoenhancing lesion in segment VII of liver which was less conspicuous on the prior exam. Given stability, findings favor benign process. No new findings of metastatic disease in the abdomen or pelvis. Cholelithiasis. CT scan of the chest done on 4/10/19 showed increased size and number bilateral pulmonary metastases. At least 4 liver lesions as above, at least 1 appearing unchanged since 07/28/2014. Metastatic disease, primary malignancy, and benign processes such as cysts and hemangioma should be considered. Recommend further evaluation with liver protocol abdomen MRI (using a nonhepatobiliary contrast agent) or CT. She underwent CT guided biopsy of the lung nodules on 4/24/19 and final pathology showed metastatic adenocarcinoma, consistent with a colorectal primary. KRAS, NRAS, BRAF study were negative. MSI reveals stable disease.  Since she is found to have metastatic colon cancer, we started first line chemotherapy with FOLFOX and panitumumab since 5/22/19. She admitted to the hospital on September 22, 2019 and released from the hospital on September 24, 2019. She was diagnosed with C. difficile colitis and required treatment at that time. CT scan of the chest done on May 27, 2020 showed mixed response of pulmonary metastases, with decrease in size of the largest pulmonary nodules, although, there has been development of new subcentimeter pulmonary nodules in both lungs. Worsening hepatic metastatic disease, with new lesions measuring up to 1.8 cm in the liver dome. On August 31, 2020, she presented to me for follow up. I have been following Ms. 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 CT scan on 4/10/19 showed increase in size and numbers of bilateral pulmonary nodules. CEA done the same day also showed increasing (52.9). She subsequently underwent CT guided biopsy of lung nodules on 4/24/19 and final pathology showed metastatic adenocarcinoma consistent with colorectal primary. All MELISSA study, BRAF were negative and MSI studies showed she has MSI stable disease. Since she is found to have metastatic colon cancer, we started first line chemotherapy with FOLFOX and panitumumab since 5/22/19. We stopped it on July 1, 2020 since she is found to have progression of the disease. She was found to have CT scan evidence of progression of the disease as well as rising CEA level. Since she has progression of the disease, I recommend her to change her chemotherapy to second chemotherapy with FOLFIRI and Panitumumab. It was started on July 15, 2020. She has more issues with FOLFIRI and panitumumab. Initially she developed oral mucositis and lately she has significant myelosuppression, even with low dose of irinotecan.     She is very sensitive to irinotecan. I will continue with current dose of irinotecan and will see how her tumor response to current chemotherapy. She is getting better today. She does not have any significant symptoms at today's visit. Past Medical History  1. Hypertension. 2.    Hyperlipidemia. 3.    Aortic stenosis status post aortic valve replacement. 4.    Gastroesophageal reflux disease. Surgical History  1. Right breast lumpectomy in 1980.  2.    Coronary artery bypass grafting on July 30, 2014. 3.    Total abdominal hysterectomy in 1980s. 4.    Exploratory laparotomy, small bowel resection, colon resection, partial bladder resection and repair, creation of colostomy on October 7, 2017. Allergies  NKDA     Social History  She denies smoking, alcohol drinking, and illicit drug abuse. She is  and currently living in [place] [id number]. Family History  No pertinent family history. Review of Systems: \"Per interval history; otherwise 10 point ROS is negative. \"  Her energy level is low, appetite and sleep are stable. She doesn't have fever, chills, night sweats, cough, shortness of breath, chest pain, hemoptysis or palpitation. Her bowels and bladder functions are normal. She denies  nausea, vomiting, abdominal pain, diarrhea, constipation, dysuria, loss of appetite or weight loss. She doesn't have neuropathy and she denies bleeding or clotting issues. No anxiety or depression. She denies any pain on today's visit. The rest of the systems are unremarkable. Vital Signs: There were no vitals taken for this visit.     Physical Exam:  CONSTITUTIONAL: awake, alert, cooperative, no apparent distress   EYES: pupils equal, round and reactive to light, sclera clear and conjunctiva normal  ENT: Normocephalic, without obvious abnormality, atraumatic  NECK: supple, symmetrical, no jugular venous distension and no carotid bruits   HEMATOLOGIC/LYMPHATIC: no cervical, supraclavicular or Component Value Date    PROT 6.9 08/25/2020     No results found for: Gilson Ga, KLFLCR  No results found for: B2M  Coagulation Panel:  Lab Results   Component Value Date    PROTIME 11.6 (L) 02/24/2020    INR 1.02 02/24/2020    APTT 19.3 (L) 02/24/2020     Anemia Panel:  Lab Results   Component Value Date    VXMGEJUW24 306.6 02/03/2020    FOLATE >20.0 (H) 02/03/2020     Tumor Markers:  Lab Results   Component Value Date    CEA 50.1 07/14/2020     Observations:  No data recorded      Assessment & Plan:   Stage IIIC low-grade invasive sigmoid colon adenocarcinoma. PLAN:  Overall Ms. Dianelys [place] is feeling quite well and does not have any significant new symptoms on today's visit. I have been following Ms. 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 CT scan on 4/10/19 showed increase in size and numbers of bilateral pulmonary nodules. CEA done the same day also showed increasing (52.9). She subsequently underwent CT guided biopsy of lung nodules on 4/24/19 and final pathology showed metastatic adenocarcinoma consistent with colorectal primary. All MELISSA study, BRAF were negative and MSI studies showed she has MSI stable disease. Since she is found to have metastatic colon cancer, we started first line chemotherapy with FOLFOX and panitumumab since 5/22/19. We stopped it on July 1, 2020 since she is found to have progression of the disease. She was found to have CT scan evidence of progression of the disease as well as rising CEA level. Since she has progression of the disease, I recommend her to change her chemotherapy to second chemotherapy with FOLFIRI and Panitumumab. It was started on July 15, 2020. She has more issues with FOLFIRI and panitumumab. Initially she developed oral mucositis and lately she has significant myelosuppression, even with low dose of irinotecan.     She is very sensitive to irinotecan. I will continue with current dose of irinotecan and will see how her tumor response to current chemotherapy. I have reviewed all these plans with Ms. Stephei Arguelles and she is in agreement with the plans. I answered all her questions and concerns for today. I asked her to follow up with her  primary care physician on a regular basis. I will continue to keep you updated on her progress. Thank you for allowing me to participate in the care of this very pleasant patient. Recent imaging and labs were reviewed and discussed with the patient.       Electronically signed by Bobby De La Cruz MD on 8/23/20 at 9:00 PM EDT

## 2020-08-31 ENCOUNTER — TELEPHONE (OUTPATIENT)
Dept: INFUSION THERAPY | Age: 75
End: 2020-08-31

## 2020-08-31 ENCOUNTER — OFFICE VISIT (OUTPATIENT)
Dept: ONCOLOGY | Age: 75
End: 2020-08-31
Payer: MEDICARE

## 2020-08-31 ENCOUNTER — HOSPITAL ENCOUNTER (OUTPATIENT)
Dept: INFUSION THERAPY | Age: 75
Discharge: HOME | End: 2020-08-31
Payer: MEDICARE

## 2020-08-31 VITALS
BODY MASS INDEX: 23.85 KG/M2 | OXYGEN SATURATION: 96 % | TEMPERATURE: 98.4 F | HEART RATE: 80 BPM | RESPIRATION RATE: 16 BRPM | DIASTOLIC BLOOD PRESSURE: 67 MMHG | WEIGHT: 134.6 LBS | SYSTOLIC BLOOD PRESSURE: 133 MMHG | HEIGHT: 63 IN

## 2020-08-31 DIAGNOSIS — C78.00 COLON CANCER METASTASIZED TO LUNG (HCC): ICD-10-CM

## 2020-08-31 DIAGNOSIS — C18.9 COLON CANCER METASTASIZED TO LUNG (HCC): ICD-10-CM

## 2020-08-31 LAB
ALBUMIN SERPL-MCNC: 3.9 GM/DL (ref 3.4–5)
ALP BLD-CCNC: 452 IU/L (ref 40–128)
ALT SERPL-CCNC: 26 U/L (ref 10–40)
ANION GAP SERPL CALCULATED.3IONS-SCNC: 11 MMOL/L (ref 4–16)
AST SERPL-CCNC: 55 IU/L (ref 15–37)
BASOPHILS ABSOLUTE: 0 K/CU MM
BASOPHILS RELATIVE PERCENT: 0.6 % (ref 0–1)
BILIRUB SERPL-MCNC: 0.6 MG/DL (ref 0–1)
BUN BLDV-MCNC: 9 MG/DL (ref 6–23)
CALCIUM SERPL-MCNC: 9.6 MG/DL (ref 8.3–10.6)
CHLORIDE BLD-SCNC: 107 MMOL/L (ref 99–110)
CO2: 26 MMOL/L (ref 21–32)
CREAT SERPL-MCNC: 1 MG/DL (ref 0.6–1.1)
DIFFERENTIAL TYPE: ABNORMAL
EOSINOPHILS ABSOLUTE: 0.1 K/CU MM
EOSINOPHILS RELATIVE PERCENT: 3.5 % (ref 0–3)
GFR AFRICAN AMERICAN: >60 ML/MIN/1.73M2
GFR NON-AFRICAN AMERICAN: 54 ML/MIN/1.73M2
GLUCOSE BLD-MCNC: 110 MG/DL (ref 70–99)
HCT VFR BLD CALC: 34.3 % (ref 37–47)
HEMOGLOBIN: 10.7 GM/DL (ref 12.5–16)
LYMPHOCYTES ABSOLUTE: 1.1 K/CU MM
LYMPHOCYTES RELATIVE PERCENT: 33.2 % (ref 24–44)
MCH RBC QN AUTO: 28.7 PG (ref 27–31)
MCHC RBC AUTO-ENTMCNC: 31.2 % (ref 32–36)
MCV RBC AUTO: 92 FL (ref 78–100)
MONOCYTES ABSOLUTE: 0.7 K/CU MM
MONOCYTES RELATIVE PERCENT: 23.4 % (ref 0–4)
PDW BLD-RTO: 15.1 % (ref 11.7–14.9)
PLATELET # BLD: 239 K/CU MM (ref 140–440)
PMV BLD AUTO: 10.9 FL (ref 7.5–11.1)
POTASSIUM SERPL-SCNC: 3.8 MMOL/L (ref 3.5–5.1)
RBC # BLD: 3.73 M/CU MM (ref 4.2–5.4)
SEGMENTED NEUTROPHILS ABSOLUTE COUNT: 1.2 K/CU MM
SEGMENTED NEUTROPHILS RELATIVE PERCENT: 39.3 % (ref 36–66)
SODIUM BLD-SCNC: 144 MMOL/L (ref 135–145)
TOTAL PROTEIN: 7 GM/DL (ref 6.4–8.2)
WBC # BLD: 3.2 K/CU MM (ref 4–10.5)

## 2020-08-31 PROCEDURE — 99214 OFFICE O/P EST MOD 30 MIN: CPT | Performed by: INTERNAL MEDICINE

## 2020-08-31 PROCEDURE — 4040F PNEUMOC VAC/ADMIN/RCVD: CPT | Performed by: INTERNAL MEDICINE

## 2020-08-31 PROCEDURE — 1123F ACP DISCUSS/DSCN MKR DOCD: CPT | Performed by: INTERNAL MEDICINE

## 2020-08-31 PROCEDURE — 1036F TOBACCO NON-USER: CPT | Performed by: INTERNAL MEDICINE

## 2020-08-31 PROCEDURE — 1090F PRES/ABSN URINE INCON ASSESS: CPT | Performed by: INTERNAL MEDICINE

## 2020-08-31 PROCEDURE — 85025 COMPLETE CBC W/AUTO DIFF WBC: CPT

## 2020-08-31 PROCEDURE — 99211 OFF/OP EST MAY X REQ PHY/QHP: CPT

## 2020-08-31 PROCEDURE — 3017F COLORECTAL CA SCREEN DOC REV: CPT | Performed by: INTERNAL MEDICINE

## 2020-08-31 PROCEDURE — G8427 DOCREV CUR MEDS BY ELIG CLIN: HCPCS | Performed by: INTERNAL MEDICINE

## 2020-08-31 PROCEDURE — G8400 PT W/DXA NO RESULTS DOC: HCPCS | Performed by: INTERNAL MEDICINE

## 2020-08-31 PROCEDURE — G8420 CALC BMI NORM PARAMETERS: HCPCS | Performed by: INTERNAL MEDICINE

## 2020-08-31 PROCEDURE — 80053 COMPREHEN METABOLIC PANEL: CPT

## 2020-08-31 ASSESSMENT — PATIENT HEALTH QUESTIONNAIRE - PHQ9
SUM OF ALL RESPONSES TO PHQ QUESTIONS 1-9: 0
SUM OF ALL RESPONSES TO PHQ QUESTIONS 1-9: 0
SUM OF ALL RESPONSES TO PHQ9 QUESTIONS 1 & 2: 0
2. FEELING DOWN, DEPRESSED OR HOPELESS: 0
1. LITTLE INTEREST OR PLEASURE IN DOING THINGS: 0

## 2020-08-31 NOTE — TELEPHONE ENCOUNTER
Called pt to inform her tx is being delayed x 1 wk; rescheduled to 9/15 @ 8:30 & lab/ov is on 9/14; pt voiced understanding.

## 2020-08-31 NOTE — PROGRESS NOTES
MA Rooming Questions  Patient: Elysia Wilson  MRN: [id number]    Date: 8/31/2020        1. Do you have any new issues?   no         2. Do you need any refills on medications?    no    3. Have you had any imaging done since your last visit?   no    4. Have you been hospitalized or seen in the emergency room since your last visit here?   no    5. Did the patient have a depression screening completed today?  No    PHQ-9 Total Score: 0 (8/31/2020  9:56 AM)       PHQ-9 Given to (if applicable):               PHQ-9 Score (if applicable):                     [] Positive     []  Negative              Does question #9 need addressed (if applicable)                     [] Yes    []  No               Karlo Valencia MA

## 2020-09-14 ENCOUNTER — OFFICE VISIT (OUTPATIENT)
Dept: ONCOLOGY | Age: 75
End: 2020-09-14
Payer: MEDICARE

## 2020-09-14 ENCOUNTER — HOSPITAL ENCOUNTER (OUTPATIENT)
Dept: INFUSION THERAPY | Age: 75
Discharge: HOME | End: 2020-09-14
Payer: MEDICARE

## 2020-09-14 VITALS
HEIGHT: 63 IN | HEART RATE: 79 BPM | WEIGHT: 134.2 LBS | OXYGEN SATURATION: 96 % | DIASTOLIC BLOOD PRESSURE: 85 MMHG | BODY MASS INDEX: 23.78 KG/M2 | SYSTOLIC BLOOD PRESSURE: 158 MMHG | TEMPERATURE: 98.7 F

## 2020-09-14 DIAGNOSIS — C18.9 COLON CANCER METASTASIZED TO LUNG (HCC): ICD-10-CM

## 2020-09-14 DIAGNOSIS — C78.00 COLON CANCER METASTASIZED TO LUNG (HCC): ICD-10-CM

## 2020-09-14 DIAGNOSIS — C18.7 MALIGNANT NEOPLASM OF SIGMOID COLON (HCC): ICD-10-CM

## 2020-09-14 LAB
ALBUMIN SERPL-MCNC: 3.3 GM/DL (ref 3.4–5)
ALP BLD-CCNC: 566 IU/L (ref 40–128)
ALT SERPL-CCNC: 24 U/L (ref 10–40)
ANION GAP SERPL CALCULATED.3IONS-SCNC: 12 MMOL/L (ref 4–16)
AST SERPL-CCNC: 50 IU/L (ref 15–37)
BASOPHILS ABSOLUTE: 0 K/CU MM
BASOPHILS RELATIVE PERCENT: 0.4 % (ref 0–1)
BILIRUB SERPL-MCNC: 0.7 MG/DL (ref 0–1)
BUN BLDV-MCNC: 11 MG/DL (ref 6–23)
CALCIUM SERPL-MCNC: 9.3 MG/DL (ref 8.3–10.6)
CEA: 281.4 NG/ML
CHLORIDE BLD-SCNC: 104 MMOL/L (ref 99–110)
CO2: 25 MMOL/L (ref 21–32)
CREAT SERPL-MCNC: 1 MG/DL (ref 0.6–1.1)
DIFFERENTIAL TYPE: ABNORMAL
EOSINOPHILS ABSOLUTE: 0.2 K/CU MM
EOSINOPHILS RELATIVE PERCENT: 2 % (ref 0–3)
GFR AFRICAN AMERICAN: >60 ML/MIN/1.73M2
GFR AFRICAN AMERICAN: >60 ML/MIN/1.73M2
GFR NON-AFRICAN AMERICAN: 54 ML/MIN/1.73M2
GFR NON-AFRICAN AMERICAN: 56 ML/MIN/1.73M2
GLUCOSE BLD-MCNC: 110 MG/DL (ref 70–99)
GLUCOSE BLD-MCNC: 113 MG/DL (ref 70–99)
HCT VFR BLD CALC: 31 % (ref 37–47)
HEMOGLOBIN: 9.8 GM/DL (ref 12.5–16)
LYMPHOCYTES ABSOLUTE: 1.1 K/CU MM
LYMPHOCYTES RELATIVE PERCENT: 9.9 % (ref 24–44)
MCH RBC QN AUTO: 28.3 PG (ref 27–31)
MCHC RBC AUTO-ENTMCNC: 31.6 % (ref 32–36)
MCV RBC AUTO: 89.6 FL (ref 78–100)
MONOCYTES ABSOLUTE: 1.7 K/CU MM
MONOCYTES RELATIVE PERCENT: 16 % (ref 0–4)
PDW BLD-RTO: 14.8 % (ref 11.7–14.9)
PLATELET # BLD: 245 K/CU MM (ref 140–440)
PMV BLD AUTO: 10.3 FL (ref 7.5–11.1)
POC CALCIUM: 1.23 MMOL/L (ref 1.12–1.32)
POC CHLORIDE: 107 MMOL/L (ref 98–109)
POC CREATININE: 1 MG/DL (ref 0.6–1.1)
POTASSIUM SERPL-SCNC: 3.6 MMOL/L (ref 3.5–4.5)
POTASSIUM SERPL-SCNC: 3.9 MMOL/L (ref 3.5–5.1)
RBC # BLD: 3.46 M/CU MM (ref 4.2–5.4)
SEGMENTED NEUTROPHILS ABSOLUTE COUNT: 7.7 K/CU MM
SEGMENTED NEUTROPHILS RELATIVE PERCENT: 71.7 % (ref 36–66)
SODIUM BLD-SCNC: 141 MMOL/L (ref 135–145)
SODIUM BLD-SCNC: 144 MMOL/L (ref 138–146)
SOURCE, BLOOD GAS: ABNORMAL
TOTAL PROTEIN: 6.6 GM/DL (ref 6.4–8.2)
WBC # BLD: 10.8 K/CU MM (ref 4–10.5)

## 2020-09-14 PROCEDURE — G8420 CALC BMI NORM PARAMETERS: HCPCS | Performed by: NURSE PRACTITIONER

## 2020-09-14 PROCEDURE — 1036F TOBACCO NON-USER: CPT | Performed by: NURSE PRACTITIONER

## 2020-09-14 PROCEDURE — G8427 DOCREV CUR MEDS BY ELIG CLIN: HCPCS | Performed by: NURSE PRACTITIONER

## 2020-09-14 PROCEDURE — 82378 CARCINOEMBRYONIC ANTIGEN: CPT

## 2020-09-14 PROCEDURE — G8400 PT W/DXA NO RESULTS DOC: HCPCS | Performed by: NURSE PRACTITIONER

## 2020-09-14 PROCEDURE — 1123F ACP DISCUSS/DSCN MKR DOCD: CPT | Performed by: NURSE PRACTITIONER

## 2020-09-14 PROCEDURE — 4040F PNEUMOC VAC/ADMIN/RCVD: CPT | Performed by: NURSE PRACTITIONER

## 2020-09-14 PROCEDURE — 1090F PRES/ABSN URINE INCON ASSESS: CPT | Performed by: NURSE PRACTITIONER

## 2020-09-14 PROCEDURE — 99214 OFFICE O/P EST MOD 30 MIN: CPT | Performed by: NURSE PRACTITIONER

## 2020-09-14 PROCEDURE — 36415 COLL VENOUS BLD VENIPUNCTURE: CPT

## 2020-09-14 PROCEDURE — 80053 COMPREHEN METABOLIC PANEL: CPT

## 2020-09-14 PROCEDURE — 85025 COMPLETE CBC W/AUTO DIFF WBC: CPT

## 2020-09-14 PROCEDURE — 99211 OFF/OP EST MAY X REQ PHY/QHP: CPT

## 2020-09-14 PROCEDURE — 3017F COLORECTAL CA SCREEN DOC REV: CPT | Performed by: NURSE PRACTITIONER

## 2020-09-14 NOTE — PROGRESS NOTES
MA Rooming Questions  Patient: Michelle Urbano  MRN: [id number]    Date: 9/14/2020        1. Do you have any new issues?   no         2. Do you need any refills on medications?    no    3. Have you had any imaging done since your last visit?   no    4. Have you been hospitalized or seen in the emergency room since your last visit here?   no    5. Did the patient have a depression screening completed today?  No    No data recorded     PHQ-9 Given to (if applicable):               PHQ-9 Score (if applicable):                     [] Positive     []  Negative              Does question #9 need addressed (if applicable)                     [] Yes    []  No               Cristian Rowe MA

## 2020-09-15 ENCOUNTER — HOSPITAL ENCOUNTER (OUTPATIENT)
Dept: INFUSION THERAPY | Age: 75
Discharge: HOME | End: 2020-09-15
Payer: MEDICARE

## 2020-09-15 VITALS
SYSTOLIC BLOOD PRESSURE: 150 MMHG | OXYGEN SATURATION: 94 % | HEIGHT: 65 IN | DIASTOLIC BLOOD PRESSURE: 73 MMHG | HEART RATE: 86 BPM | RESPIRATION RATE: 16 BRPM | WEIGHT: 133.8 LBS | BODY MASS INDEX: 22.29 KG/M2 | TEMPERATURE: 97.9 F

## 2020-09-15 DIAGNOSIS — C18.7 MALIGNANT NEOPLASM OF SIGMOID COLON (HCC): ICD-10-CM

## 2020-09-15 DIAGNOSIS — C18.9 COLON CANCER METASTASIZED TO LUNG (HCC): Primary | ICD-10-CM

## 2020-09-15 DIAGNOSIS — C78.00 COLON CANCER METASTASIZED TO LUNG (HCC): Primary | ICD-10-CM

## 2020-09-15 PROCEDURE — 96367 TX/PROPH/DG ADDL SEQ IV INF: CPT

## 2020-09-15 PROCEDURE — 96413 CHEMO IV INFUSION 1 HR: CPT

## 2020-09-15 PROCEDURE — 96375 TX/PRO/DX INJ NEW DRUG ADDON: CPT

## 2020-09-15 PROCEDURE — 6360000002 HC RX W HCPCS: Performed by: INTERNAL MEDICINE

## 2020-09-15 PROCEDURE — 96368 THER/DIAG CONCURRENT INF: CPT

## 2020-09-15 PROCEDURE — 96415 CHEMO IV INFUSION ADDL HR: CPT

## 2020-09-15 PROCEDURE — 2580000003 HC RX 258: Performed by: INTERNAL MEDICINE

## 2020-09-15 PROCEDURE — 96411 CHEMO IV PUSH ADDL DRUG: CPT

## 2020-09-15 PROCEDURE — 96417 CHEMO IV INFUS EACH ADDL SEQ: CPT

## 2020-09-15 RX ORDER — SODIUM CHLORIDE 9 MG/ML
INJECTION, SOLUTION INTRAVENOUS ONCE
Status: DISCONTINUED | OUTPATIENT
Start: 2020-09-15 | End: 2020-09-16 | Stop reason: HOSPADM

## 2020-09-15 RX ORDER — SODIUM CHLORIDE 9 MG/ML
INJECTION, SOLUTION INTRAVENOUS ONCE
Status: CANCELLED | OUTPATIENT
Start: 2020-09-15

## 2020-09-15 RX ORDER — SODIUM CHLORIDE 0.9 % (FLUSH) 0.9 %
5 SYRINGE (ML) INJECTION PRN
Status: CANCELLED | OUTPATIENT
Start: 2020-09-15

## 2020-09-15 RX ORDER — DEXTROSE MONOHYDRATE 50 MG/ML
INJECTION, SOLUTION INTRAVENOUS ONCE
Status: CANCELLED | OUTPATIENT
Start: 2020-09-15

## 2020-09-15 RX ORDER — PALONOSETRON 0.05 MG/ML
0.25 INJECTION, SOLUTION INTRAVENOUS ONCE
Status: COMPLETED | OUTPATIENT
Start: 2020-09-15 | End: 2020-09-15

## 2020-09-15 RX ORDER — DIPHENHYDRAMINE HYDROCHLORIDE 50 MG/ML
50 INJECTION INTRAMUSCULAR; INTRAVENOUS ONCE
Status: CANCELLED | OUTPATIENT
Start: 2020-09-15

## 2020-09-15 RX ORDER — MEPERIDINE HYDROCHLORIDE 50 MG/ML
12.5 INJECTION INTRAMUSCULAR; INTRAVENOUS; SUBCUTANEOUS ONCE
Status: CANCELLED | OUTPATIENT
Start: 2020-09-15

## 2020-09-15 RX ORDER — SODIUM CHLORIDE 9 MG/ML
INJECTION, SOLUTION INTRAVENOUS CONTINUOUS
Status: CANCELLED | OUTPATIENT
Start: 2020-09-15

## 2020-09-15 RX ORDER — ATROPINE SULFATE 0.4 MG/ML
0.4 AMPUL (ML) INJECTION
Status: CANCELLED | OUTPATIENT
Start: 2020-09-15

## 2020-09-15 RX ORDER — SODIUM CHLORIDE 0.9 % (FLUSH) 0.9 %
10 SYRINGE (ML) INJECTION PRN
Status: DISCONTINUED | OUTPATIENT
Start: 2020-09-15 | End: 2020-09-16 | Stop reason: HOSPADM

## 2020-09-15 RX ORDER — ATROPINE SULFATE 0.4 MG/ML
0.4 AMPUL (ML) INJECTION
Status: COMPLETED | OUTPATIENT
Start: 2020-09-15 | End: 2020-09-15

## 2020-09-15 RX ORDER — EPINEPHRINE 1 MG/ML
0.3 INJECTION, SOLUTION, CONCENTRATE INTRAVENOUS PRN
Status: CANCELLED | OUTPATIENT
Start: 2020-09-15

## 2020-09-15 RX ORDER — HEPARIN SODIUM (PORCINE) LOCK FLUSH IV SOLN 100 UNIT/ML 100 UNIT/ML
500 SOLUTION INTRAVENOUS PRN
Status: CANCELLED | OUTPATIENT
Start: 2020-09-15

## 2020-09-15 RX ORDER — SODIUM CHLORIDE 0.9 % (FLUSH) 0.9 %
10 SYRINGE (ML) INJECTION PRN
Status: CANCELLED | OUTPATIENT
Start: 2020-09-15

## 2020-09-15 RX ORDER — DEXTROSE MONOHYDRATE 50 MG/ML
INJECTION, SOLUTION INTRAVENOUS ONCE
Status: DISCONTINUED | OUTPATIENT
Start: 2020-09-15 | End: 2020-09-16 | Stop reason: HOSPADM

## 2020-09-15 RX ORDER — PALONOSETRON 0.05 MG/ML
0.25 INJECTION, SOLUTION INTRAVENOUS ONCE
Status: CANCELLED | OUTPATIENT
Start: 2020-09-15

## 2020-09-15 RX ORDER — METHYLPREDNISOLONE SODIUM SUCCINATE 125 MG/2ML
125 INJECTION, POWDER, LYOPHILIZED, FOR SOLUTION INTRAMUSCULAR; INTRAVENOUS ONCE
Status: CANCELLED | OUTPATIENT
Start: 2020-09-15

## 2020-09-15 RX ADMIN — ATROPINE SULFATE 0.4 MG: 0.4 INJECTION, SOLUTION INTRAMUSCULAR; INTRAVENOUS; SUBCUTANEOUS at 09:01

## 2020-09-15 RX ADMIN — PANITUMUMAB 390 MG: 100 SOLUTION INTRAVENOUS at 09:20

## 2020-09-15 RX ADMIN — FLUOROURACIL 600 MG: 50 INJECTION, SOLUTION INTRAVENOUS at 11:53

## 2020-09-15 RX ADMIN — DEXTROSE MONOHYDRATE: 50 INJECTION, SOLUTION INTRAVENOUS at 09:01

## 2020-09-15 RX ADMIN — DEXAMETHASONE SODIUM PHOSPHATE 12 MG: 4 INJECTION, SOLUTION INTRAMUSCULAR; INTRAVENOUS at 09:01

## 2020-09-15 RX ADMIN — LEUCOVORIN CALCIUM 200 MG: 200 INJECTION, POWDER, LYOPHILIZED, FOR SUSPENSION INTRAMUSCULAR; INTRAVENOUS at 09:58

## 2020-09-15 RX ADMIN — IRINOTECAN HYDROCHLORIDE 160 MG: 20 INJECTION, SOLUTION INTRAVENOUS at 09:58

## 2020-09-15 RX ADMIN — PALONOSETRON 0.25 MG: 0.25 INJECTION, SOLUTION INTRAVENOUS at 09:01

## 2020-09-15 RX ADMIN — SODIUM CHLORIDE: 9 INJECTION, SOLUTION INTRAVENOUS at 09:00

## 2020-09-15 NOTE — PROGRESS NOTES
Patient arrived to treatment suite for pre-medications, chemotherapy and immunotherapy infusions, and connection to a home infusion pump. Right chest mediport accessed and good blood return noted. Patient has no questions or concerns for the doctor at this time, but did ask about possibly starting Ensure. Provided samples and coupons for this patient. Also spoke with her about possibly considering a B-complex supplement for energy. Treatment approved and given. Patient tolerated well. Patient's status assessed and documented appropriately. All labs and required results were also reviewed today. Treatment parameters have been reviewed. Today's treatment has been approved by the provider. Treatment orders and medication sequencing (when applicable) was verified by 2 registered nurses. The treatment plan was confirmed with the patient prior to administration, and the patient understands the need to report any treatment-related symptoms. Prior to administration, when applicable, the following 8 elements of medication administration were reviewed with 2nd Registered Nurse prior to dosing: drug name, drug dose, infusion volume when prepared in a syringe, rate of administration, expiration dates and/or times, appearance and integrity of drug(s), and rate of pump for infusion. The 5 rights of medication administration have been verified.

## 2020-09-17 ENCOUNTER — HOSPITAL ENCOUNTER (OUTPATIENT)
Dept: INFUSION THERAPY | Age: 75
Discharge: HOME | End: 2020-09-17
Payer: MEDICARE

## 2020-09-17 DIAGNOSIS — C18.9 COLON CANCER METASTASIZED TO LUNG (HCC): Primary | ICD-10-CM

## 2020-09-17 DIAGNOSIS — C78.00 COLON CANCER METASTASIZED TO LUNG (HCC): Primary | ICD-10-CM

## 2020-09-17 PROCEDURE — 6360000002 HC RX W HCPCS: Performed by: INTERNAL MEDICINE

## 2020-09-17 PROCEDURE — 2580000003 HC RX 258: Performed by: INTERNAL MEDICINE

## 2020-09-17 PROCEDURE — 96523 IRRIG DRUG DELIVERY DEVICE: CPT

## 2020-09-17 RX ORDER — SODIUM CHLORIDE 0.9 % (FLUSH) 0.9 %
10 SYRINGE (ML) INJECTION PRN
Status: CANCELLED | OUTPATIENT
Start: 2020-09-17

## 2020-09-17 RX ORDER — HEPARIN SODIUM (PORCINE) LOCK FLUSH IV SOLN 100 UNIT/ML 100 UNIT/ML
500 SOLUTION INTRAVENOUS PRN
Status: CANCELLED | OUTPATIENT
Start: 2020-09-17

## 2020-09-17 RX ORDER — SODIUM CHLORIDE 0.9 % (FLUSH) 0.9 %
20 SYRINGE (ML) INJECTION PRN
Status: CANCELLED | OUTPATIENT
Start: 2020-09-17

## 2020-09-17 RX ORDER — HEPARIN SODIUM (PORCINE) LOCK FLUSH IV SOLN 100 UNIT/ML 100 UNIT/ML
500 SOLUTION INTRAVENOUS PRN
Status: DISCONTINUED | OUTPATIENT
Start: 2020-09-17 | End: 2020-09-18 | Stop reason: HOSPADM

## 2020-09-17 RX ORDER — SODIUM CHLORIDE 0.9 % (FLUSH) 0.9 %
10 SYRINGE (ML) INJECTION PRN
Status: DISCONTINUED | OUTPATIENT
Start: 2020-09-17 | End: 2020-09-18 | Stop reason: HOSPADM

## 2020-09-17 RX ADMIN — Medication 500 UNITS: at 11:07

## 2020-09-17 RX ADMIN — SODIUM CHLORIDE, PRESERVATIVE FREE 10 ML: 5 INJECTION INTRAVENOUS at 11:07

## 2020-09-17 NOTE — PROGRESS NOTES
1110: pt here for 5FU pump disconnect. Pt A&OX3. No new issues to report. Port flushed iwht 10ml NS and Heparin and deaccessed, bandaid to site. Pt reports she has return appts scheduled.

## 2020-09-20 NOTE — PROGRESS NOTES
Patient Name: Tomasa He  Patient : 1945  Patient MRN: [id number]     Primary Oncologist: Pato Rodriguez MD  Referring Provider: Ana Lilia Durant MD     Date of Service: 2020      Chief Complaint:   Chief Complaint   Patient presents with   [address]     Patient Active Problem List:     Malignant neoplasm of sigmoid colon      Pleural effusion, right     Colon cancer metastasized to lung     Anemia     Drug induced neutropenia    HPI:   Ms. Sheila Stallworth is a 29-year-old very pleasant patient with medical history significant for hypertension, hyperlipidemia, aortic stenosis status post aortic valve replacement, gastroesophageal reflux disease, initially presented to me on 2017, to follow up with her stage IIIC sigmoid colon low-grade invasive adenocarcinoma. She initially presented to [facility] on 2017, with nausea and vomiting. She had a CT angiogram of the abdomen on admission, 2017, and it showed irregular, asymmetric wall thickening involving a 4 cm segment of sigmoid colon with somewhat nodular infiltration of adjacent fat. Dilatation proximally which is most prominent involving the colon at the hepatic flexure where it measures up to 8.9 cm. Findings are concerning for obstructing sigmoid neoplasm. She also has a mildly enlarged lymph node anterior to the distal iliac vasculature. She was subsequently evaluated by Dr. Rocky Mott and she underwent exploratory laparotomy, sigmoid colon resection with end colostomy, bladder dome resection and closure, segment of small bowel resection, on 2017. Final pathology revealed low-grade invasive adenocarcinoma. Chronic inflammation and fibrosis with focal abscess associated with invasive adenocarcinoma. Small segment of small intestine revealed mild chronic nonspecific mucosal enteritis. Tumor size was 4.3 x 4.5 cm in diameter and all the surgical margins were negative. Lymphovascular invasion and perineural invasion were present. Eight out of 11 lymph nodes examined were positive for metastasis (pT3 pN2b pMx). Her CEA level on October 8, 2017, was 6.5 ng/mL. She was discharged from the hospital with an appointment to see me as an outpatient. After a long discussion with Ms. Adolfo Sharp, she has decided to have adjuvant chemotherapy with FOLFOX for her stage IIIC colon cancer. Adjuvant chemotherapy with FOLFOX was started on November 28, 2017 and we stopped it on April 24, 2018 (total 10 treatments), after she experienced syncope attack after last treatment. We decided to follow her closely. She underwent colonoscopy by Dr. Edgar Wilson on January 24, 2018 and it was a normal study according to her. She has CT scan of the chest, abdomen and pelvis on October 15, 2018 and it showed postsurgical changes from partial colectomy with left lower quadrant colostomy. Bilateral 2-6 mm pulmonary nodules, new since 2014, one of which has slightly increased in size since 2017 in the left upper lobe. These are suspicious for metastatic disease. Grossly stable 1.2 cm hypoenhancing lesion in segment VII of liver which was less conspicuous on the prior exam. Given stability, findings favor benign process. No new findings of metastatic disease in the abdomen or pelvis. Cholelithiasis. CT scan of the chest done on 4/10/19 showed increased size and number bilateral pulmonary metastases. At least 4 liver lesions as above, at least 1 appearing unchanged since 07/28/2014. Metastatic disease, primary malignancy, and benign processes such as cysts and hemangioma should be considered. Recommend further evaluation with liver protocol abdomen MRI (using a nonhepatobiliary contrast agent) or CT. She underwent CT guided biopsy of the lung nodules on 4/24/19 and final pathology showed metastatic adenocarcinoma, consistent with a colorectal primary. KRAS, NRAS, BRAF study were negative.  [facility] Large Adult)   Pulse 85   Temp 98 °F (36.7 °C) (Infrared)   Resp 16   Ht 5' 5\" (1.651 m)   Wt 130 lb (59 kg)   SpO2 98%   BMI 21.63 kg/m²     Physical Exam:  CONSTITUTIONAL: awake, alert, cooperative, no apparent distress   EYES: pupils equal, round and reactive to light, sclera clear and conjunctiva normal  ENT: Normocephalic, without obvious abnormality, atraumatic  NECK: supple, symmetrical, no jugular venous distension and no carotid bruits   HEMATOLOGIC/LYMPHATIC: no cervical, supraclavicular or axillary lymphadenopathy   LUNGS: VBS, no wheezes, no crackles, no increased work of breathing and clear to auscultation   CARDIOVASCULAR: regular rate and rhythm, normal S1 and S2, no murmur noted  ABDOMEN: normal bowel sounds x 4, soft, non-distended, non-tender, no masses palpated, no hepatosplenomegaly   MUSCULOSKELETAL: full range of motion noted, tone is normal  NEUROLOGIC: awake, alert, oriented to name, place and time. Motor skills grossly intact. SKIN: Normal skin color, texture, turgor and no jaundice.  appears intact   EXTREMITIES: no LE edema, no leg swelling, no cyanosis     Labs:  Hematology:  Lab Results   Component Value Date    WBC 2.9 (L) 09/25/2020    RBC 3.33 (L) 09/25/2020    HGB 9.4 (L) 09/25/2020    HCT 29.8 (L) 09/25/2020    MCV 89.5 09/25/2020    MCH 28.2 09/25/2020    MCHC 31.5 (L) 09/25/2020    RDW 14.4 09/25/2020     09/25/2020    MPV 10.5 09/25/2020    BANDSPCT 13 (H) 10/08/2017    SEGSPCT 59.8 09/25/2020    EOSRELPCT 1.4 09/25/2020    BASOPCT 0.3 09/25/2020    LYMPHOPCT 32.0 09/25/2020    MONOPCT 6.5 (H) 09/25/2020    BANDABS 0.56 10/08/2017    SEGSABS 1.7 09/25/2020    EOSABS 0.0 09/25/2020    BASOSABS 0.0 09/25/2020    LYMPHSABS 0.9 09/25/2020    MONOSABS 0.2 09/25/2020    DIFFTYPE AUTOMATED DIFFERENTIAL 09/25/2020     No results found for: ESR  Chemistry:  Lab Results   Component Value Date     09/25/2020    K 4.0 09/25/2020     09/25/2020    CO2 28 09/25/2020 BUN 19 09/25/2020    CREATININE 1.0 09/25/2020    GLUCOSE 136 (H) 09/25/2020    CALCIUM 9.4 09/25/2020    PROT 6.6 09/25/2020    LABALBU 3.6 09/25/2020    BILITOT 0.5 09/25/2020    ALKPHOS 499 (H) 09/25/2020    AST 38 (H) 09/25/2020    ALT 18 09/25/2020    LABGLOM 54 (L) 09/25/2020    GFRAA >60 09/25/2020    PHOS 4.7 11/12/2019    MG 2.4 01/06/2020    POCCA 1.23 09/14/2020    POCGLU 110 (H) 09/14/2020     No results found for: MMA, LDH, HOMOCYSTEINE  No components found for: LD  No results found for: TSHHS, T4FREE, FT3  Immunology:  Lab Results   Component Value Date    PROT 6.6 09/25/2020     No results found for: Jaimie Kieran, KLFLCR  No results found for: B2M  Coagulation Panel:  Lab Results   Component Value Date    PROTIME 11.6 (L) 02/24/2020    INR 1.02 02/24/2020    APTT 19.3 (L) 02/24/2020     Anemia Panel:  Lab Results   Component Value Date    UYBALRGY36 306.6 02/03/2020    FOLATE >20.0 (H) 02/03/2020     Tumor Markers:  Lab Results   Component Value Date    .9 09/25/2020     Observations:  PHQ-9 Total Score: 0 (9/25/2020 10:18 AM)        Assessment & Plan:   Stage IIIC low-grade invasive sigmoid colon adenocarcinoma. PLAN:  Overall Ms. Christopher Mckeon is feeling quite well and does not have any significant new symptoms on today's visit. I have been following Ms. 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 CT scan on 4/10/19 showed increase in size and numbers of bilateral pulmonary nodules. CEA done the same day also showed increasing (52.9). She subsequently underwent CT guided biopsy of lung nodules on 4/24/19 and final pathology showed metastatic adenocarcinoma consistent with colorectal primary. All MELISSA study, BRAF were negative and MSI studies showed she has MSI stable disease.  Since she is found to have metastatic colon cancer, we started first line chemotherapy with FOLFOX and panitumumab since 5/22/19. We stopped it on July 1, 2020 since she is found to have progression of the disease. She was found to have CT scan evidence of progression of the disease as well as rising CEA level. Since she has progression of the disease, I recommend her to change her chemotherapy to second chemotherapy with FOLFIRI and Panitumumab. It was started on July 15, 2020. She has more issues with FOLFIRI and panitumumab. Initially she developed oral mucositis and lately she has significant myelosuppression, even with low dose of irinotecan. She is very sensitive to irinotecan. I will continue with current dose of irinotecan and will see how her tumor response to current chemotherapy. I recognized that her CEA level is rising lately and I am concerned about possible progression of her metastatic disease. I recommend her to have a CT scan of the chest, abdomen and pelvis soon and I will see her again after that. If she is found to have progression of the disease, I will consider to change her chemotherapy. I have reviewed all these plans with Ms. Josette Jose and she is in agreement with the plans. I answered all her questions and concerns for today. I asked her to follow up with her  primary care physician on a regular basis. I will continue to keep you updated on her progress. Thank you for allowing me to participate in the care of this very pleasant patient. Recent imaging and labs were reviewed and discussed with the patient.       Electronically signed by Radha Yates MD on 8/23/20 at 9:00 PM EDT

## 2020-09-24 ENCOUNTER — OFFICE VISIT (OUTPATIENT)
Dept: PULMONOLOGY | Age: 75
End: 2020-09-24
Payer: MEDICARE

## 2020-09-24 VITALS
WEIGHT: 133 LBS | HEART RATE: 82 BPM | BODY MASS INDEX: 22.16 KG/M2 | HEIGHT: 65 IN | SYSTOLIC BLOOD PRESSURE: 104 MMHG | DIASTOLIC BLOOD PRESSURE: 62 MMHG | OXYGEN SATURATION: 96 %

## 2020-09-24 PROCEDURE — 1090F PRES/ABSN URINE INCON ASSESS: CPT | Performed by: INTERNAL MEDICINE

## 2020-09-24 PROCEDURE — G8420 CALC BMI NORM PARAMETERS: HCPCS | Performed by: INTERNAL MEDICINE

## 2020-09-24 PROCEDURE — G8400 PT W/DXA NO RESULTS DOC: HCPCS | Performed by: INTERNAL MEDICINE

## 2020-09-24 PROCEDURE — G8926 SPIRO NO PERF OR DOC: HCPCS | Performed by: INTERNAL MEDICINE

## 2020-09-24 PROCEDURE — 1036F TOBACCO NON-USER: CPT | Performed by: INTERNAL MEDICINE

## 2020-09-24 PROCEDURE — 99213 OFFICE O/P EST LOW 20 MIN: CPT | Performed by: INTERNAL MEDICINE

## 2020-09-24 PROCEDURE — 3017F COLORECTAL CA SCREEN DOC REV: CPT | Performed by: INTERNAL MEDICINE

## 2020-09-24 PROCEDURE — G8427 DOCREV CUR MEDS BY ELIG CLIN: HCPCS | Performed by: INTERNAL MEDICINE

## 2020-09-24 PROCEDURE — 4040F PNEUMOC VAC/ADMIN/RCVD: CPT | Performed by: INTERNAL MEDICINE

## 2020-09-24 PROCEDURE — 3023F SPIROM DOC REV: CPT | Performed by: INTERNAL MEDICINE

## 2020-09-24 PROCEDURE — 1123F ACP DISCUSS/DSCN MKR DOCD: CPT | Performed by: INTERNAL MEDICINE

## 2020-09-24 NOTE — PROGRESS NOTES
SUBJECTIVE:  Chief Complaint: Metastatic colon cancer to lung, multiple pulmonary nodules, mild COPD, mild dyspnea on exertion  Sara Wu states that she has had no recent episodes of bronchitis. She is not on bronchodilator therapy. She has been tested for nighttime oxygen but refused therapy. She denies chest pain. She is not complaining of shortness of breath but will complain of mild dyspnea on exertion. She is being followed by Dr. Childress July oncology and has received chemotherapy. She denies hemoptysis or chest pain. She seems mildly confused but is not aware of any COVID-19 exposure. She denies fever associated with cough      ROS:  Constitution:  HEENT: Negative for ear, throat pain  Cardiovascular: Negative for chest pain, syncope, edema  Pulmonary: See HPI  Musculoskeletal: Negative for DVT, myalgias, arthralgias    OBJECTIVE:  /62   Pulse 82   Ht 5' 5\" (1.651 m)   Wt 133 lb (60.3 kg)   SpO2 96%   BMI 22.13 kg/m²      Physical Exam:  Constitutional:  She appears well developed and well-nourished. Neck:  Supple, No palpable lymphadenopathy, No JVD  Cardiovascular:  S1, S2 Normal, Regular rhythm, no murmurs or gallops, No pericardial  rubs. Pulmonary: Mildly diminished but otherwise fairly clear breath sounds throughout all lung areas without wheezing or rhonchi  Abdomen: Not examined  Extremities: no edema, No DVT  Neurologic: Oriented x3, No focal deficits    Radiology: CT chest 5/27/2020 showed mixed response of pulmonary metastasis with decrease in the size of the largest pulmonary nodules although there has been development of new subcentimeter pulmonary nodules in both lungs. Worsening hepatic metastatic disease with new lesions measuring up to 1.8 cm  PFT: Office spirometry on 10/17/2019 demonstrated a moderate obstructive defect with no significant response to bronchodilators.   She has difficulty completing this testing and following commands        ASSESSMENT:    1. Multiple lung nodules on CT    2. Colon cancer metastasized to lung ([address]    3. COPD, mild ([address]    4. Nocturnal hypoxemia          PLAN:   I will make no change in her current therapy. If she becomes more symptomatic I would consider starting her back on bronchodilators. Her overall prognosis is guarded. I will continue to follow her    We have discussed the need to maintain yearly flu immunization, pneumococcal vaccination. We have discussed Coronavirus precaution including handwashing practice, wiping items touched in public such as gas pumps, door handles, shopping carts, etc. Self monitoring for infection - fever, chills, cough, SOB. Should they develop symptoms they should call office for further instructions. Return in about 6 months (around 3/24/2021) for Recheck for COPD, recheck for lung nodule. This dictation was performed with a verbal recognition program and it was checked for errors. It is possible that there are still dictated errors within this office note. Any errors should be brought immediately to my attention for correction. All efforts were made to ensure that this office note is accurate.

## 2020-09-25 ENCOUNTER — HOSPITAL ENCOUNTER (OUTPATIENT)
Dept: INFUSION THERAPY | Age: 75
Discharge: HOME | End: 2020-09-25
Payer: MEDICARE

## 2020-09-25 ENCOUNTER — OFFICE VISIT (OUTPATIENT)
Dept: ONCOLOGY | Age: 75
End: 2020-09-25
Payer: MEDICARE

## 2020-09-25 VITALS
TEMPERATURE: 98 F | OXYGEN SATURATION: 98 % | BODY MASS INDEX: 21.66 KG/M2 | SYSTOLIC BLOOD PRESSURE: 100 MMHG | HEIGHT: 65 IN | DIASTOLIC BLOOD PRESSURE: 65 MMHG | HEART RATE: 85 BPM | WEIGHT: 130 LBS | RESPIRATION RATE: 16 BRPM

## 2020-09-25 DIAGNOSIS — C78.00 COLON CANCER METASTASIZED TO LUNG (HCC): ICD-10-CM

## 2020-09-25 DIAGNOSIS — C18.7 MALIGNANT NEOPLASM OF SIGMOID COLON (HCC): ICD-10-CM

## 2020-09-25 DIAGNOSIS — C18.9 COLON CANCER METASTASIZED TO LUNG (HCC): ICD-10-CM

## 2020-09-25 LAB
ALBUMIN SERPL-MCNC: 3.6 GM/DL (ref 3.4–5)
ALP BLD-CCNC: 499 IU/L (ref 40–129)
ALT SERPL-CCNC: 18 U/L (ref 10–40)
ANION GAP SERPL CALCULATED.3IONS-SCNC: 10 MMOL/L (ref 4–16)
AST SERPL-CCNC: 38 IU/L (ref 15–37)
BASOPHILS ABSOLUTE: 0 K/CU MM
BASOPHILS RELATIVE PERCENT: 0.3 % (ref 0–1)
BILIRUB SERPL-MCNC: 0.5 MG/DL (ref 0–1)
BUN BLDV-MCNC: 19 MG/DL (ref 6–23)
CALCIUM SERPL-MCNC: 9.4 MG/DL (ref 8.3–10.6)
CEA: 168.9 NG/ML
CHLORIDE BLD-SCNC: 102 MMOL/L (ref 99–110)
CO2: 28 MMOL/L (ref 21–32)
CREAT SERPL-MCNC: 1 MG/DL (ref 0.6–1.1)
DIFFERENTIAL TYPE: ABNORMAL
EOSINOPHILS ABSOLUTE: 0 K/CU MM
EOSINOPHILS RELATIVE PERCENT: 1.4 % (ref 0–3)
GFR AFRICAN AMERICAN: >60 ML/MIN/1.73M2
GFR NON-AFRICAN AMERICAN: 54 ML/MIN/1.73M2
GLUCOSE BLD-MCNC: 136 MG/DL (ref 70–99)
HCT VFR BLD CALC: 29.8 % (ref 37–47)
HEMOGLOBIN: 9.4 GM/DL (ref 12.5–16)
LYMPHOCYTES ABSOLUTE: 0.9 K/CU MM
LYMPHOCYTES RELATIVE PERCENT: 32 % (ref 24–44)
MCH RBC QN AUTO: 28.2 PG (ref 27–31)
MCHC RBC AUTO-ENTMCNC: 31.5 % (ref 32–36)
MCV RBC AUTO: 89.5 FL (ref 78–100)
MONOCYTES ABSOLUTE: 0.2 K/CU MM
MONOCYTES RELATIVE PERCENT: 6.5 % (ref 0–4)
PDW BLD-RTO: 14.4 % (ref 11.7–14.9)
PLATELET # BLD: 182 K/CU MM (ref 140–440)
PMV BLD AUTO: 10.5 FL (ref 7.5–11.1)
POTASSIUM SERPL-SCNC: 4 MMOL/L (ref 3.5–5.1)
RBC # BLD: 3.33 M/CU MM (ref 4.2–5.4)
SEGMENTED NEUTROPHILS ABSOLUTE COUNT: 1.7 K/CU MM
SEGMENTED NEUTROPHILS RELATIVE PERCENT: 59.8 % (ref 36–66)
SODIUM BLD-SCNC: 140 MMOL/L (ref 135–145)
TOTAL PROTEIN: 6.6 GM/DL (ref 6.4–8.2)
WBC # BLD: 2.9 K/CU MM (ref 4–10.5)

## 2020-09-25 PROCEDURE — G8427 DOCREV CUR MEDS BY ELIG CLIN: HCPCS | Performed by: INTERNAL MEDICINE

## 2020-09-25 PROCEDURE — 3017F COLORECTAL CA SCREEN DOC REV: CPT | Performed by: INTERNAL MEDICINE

## 2020-09-25 PROCEDURE — 1123F ACP DISCUSS/DSCN MKR DOCD: CPT | Performed by: INTERNAL MEDICINE

## 2020-09-25 PROCEDURE — 85025 COMPLETE CBC W/AUTO DIFF WBC: CPT

## 2020-09-25 PROCEDURE — 1090F PRES/ABSN URINE INCON ASSESS: CPT | Performed by: INTERNAL MEDICINE

## 2020-09-25 PROCEDURE — 99214 OFFICE O/P EST MOD 30 MIN: CPT | Performed by: INTERNAL MEDICINE

## 2020-09-25 PROCEDURE — G8400 PT W/DXA NO RESULTS DOC: HCPCS | Performed by: INTERNAL MEDICINE

## 2020-09-25 PROCEDURE — 80053 COMPREHEN METABOLIC PANEL: CPT

## 2020-09-25 PROCEDURE — 82378 CARCINOEMBRYONIC ANTIGEN: CPT

## 2020-09-25 PROCEDURE — 4040F PNEUMOC VAC/ADMIN/RCVD: CPT | Performed by: INTERNAL MEDICINE

## 2020-09-25 PROCEDURE — 1036F TOBACCO NON-USER: CPT | Performed by: INTERNAL MEDICINE

## 2020-09-25 PROCEDURE — G8420 CALC BMI NORM PARAMETERS: HCPCS | Performed by: INTERNAL MEDICINE

## 2020-09-25 PROCEDURE — 36415 COLL VENOUS BLD VENIPUNCTURE: CPT

## 2020-09-25 ASSESSMENT — PATIENT HEALTH QUESTIONNAIRE - PHQ9
SUM OF ALL RESPONSES TO PHQ QUESTIONS 1-9: 0
SUM OF ALL RESPONSES TO PHQ9 QUESTIONS 1 & 2: 0
SUM OF ALL RESPONSES TO PHQ QUESTIONS 1-9: 0
1. LITTLE INTEREST OR PLEASURE IN DOING THINGS: 0
2. FEELING DOWN, DEPRESSED OR HOPELESS: 0

## 2020-09-28 ENCOUNTER — TELEPHONE (OUTPATIENT)
Dept: ONCOLOGY | Age: 75
End: 2020-09-28

## 2020-09-28 NOTE — TELEPHONE ENCOUNTER
Spoke with patient regarding her CT scan at [facility]  On 10.05.2020 arr 0930. Went over prep and she stated understanding.

## 2020-09-29 ENCOUNTER — HOSPITAL ENCOUNTER (OUTPATIENT)
Dept: INFUSION THERAPY | Age: 75
Discharge: HOME | End: 2020-09-29
Payer: MEDICARE

## 2020-09-29 VITALS
TEMPERATURE: 97.8 F | DIASTOLIC BLOOD PRESSURE: 68 MMHG | HEART RATE: 80 BPM | RESPIRATION RATE: 16 BRPM | BODY MASS INDEX: 21.66 KG/M2 | WEIGHT: 130 LBS | HEIGHT: 65 IN | OXYGEN SATURATION: 96 % | SYSTOLIC BLOOD PRESSURE: 128 MMHG

## 2020-09-29 DIAGNOSIS — C18.7 MALIGNANT NEOPLASM OF SIGMOID COLON (HCC): Primary | ICD-10-CM

## 2020-09-29 PROBLEM — C78.01 MALIGNANT NEOPLASM METASTATIC TO RIGHT LUNG (HCC): Status: ACTIVE | Noted: 2020-09-29

## 2020-09-29 PROCEDURE — 96415 CHEMO IV INFUSION ADDL HR: CPT

## 2020-09-29 PROCEDURE — 96413 CHEMO IV INFUSION 1 HR: CPT

## 2020-09-29 PROCEDURE — 96521 REFILL/MAINT PORTABLE PUMP: CPT

## 2020-09-29 PROCEDURE — 96375 TX/PRO/DX INJ NEW DRUG ADDON: CPT

## 2020-09-29 PROCEDURE — 96367 TX/PROPH/DG ADDL SEQ IV INF: CPT

## 2020-09-29 PROCEDURE — 2580000003 HC RX 258: Performed by: INTERNAL MEDICINE

## 2020-09-29 PROCEDURE — 6360000002 HC RX W HCPCS: Performed by: INTERNAL MEDICINE

## 2020-09-29 PROCEDURE — 96374 THER/PROPH/DIAG INJ IV PUSH: CPT

## 2020-09-29 RX ORDER — METHYLPREDNISOLONE SODIUM SUCCINATE 125 MG/2ML
125 INJECTION, POWDER, LYOPHILIZED, FOR SOLUTION INTRAMUSCULAR; INTRAVENOUS ONCE
Status: CANCELLED | OUTPATIENT
Start: 2020-09-29

## 2020-09-29 RX ORDER — MEPERIDINE HYDROCHLORIDE 50 MG/ML
12.5 INJECTION INTRAMUSCULAR; INTRAVENOUS; SUBCUTANEOUS ONCE
Status: CANCELLED | OUTPATIENT
Start: 2020-09-29

## 2020-09-29 RX ORDER — SODIUM CHLORIDE 0.9 % (FLUSH) 0.9 %
5 SYRINGE (ML) INJECTION PRN
Status: CANCELLED | OUTPATIENT
Start: 2020-09-29

## 2020-09-29 RX ORDER — HEPARIN SODIUM (PORCINE) LOCK FLUSH IV SOLN 100 UNIT/ML 100 UNIT/ML
500 SOLUTION INTRAVENOUS PRN
Status: CANCELLED | OUTPATIENT
Start: 2020-09-29

## 2020-09-29 RX ORDER — DEXTROSE MONOHYDRATE 50 MG/ML
INJECTION, SOLUTION INTRAVENOUS ONCE
Status: CANCELLED | OUTPATIENT
Start: 2020-09-29

## 2020-09-29 RX ORDER — EPINEPHRINE 1 MG/ML
0.3 INJECTION, SOLUTION, CONCENTRATE INTRAVENOUS PRN
Status: CANCELLED | OUTPATIENT
Start: 2020-09-29

## 2020-09-29 RX ORDER — DIPHENHYDRAMINE HYDROCHLORIDE 50 MG/ML
50 INJECTION INTRAMUSCULAR; INTRAVENOUS ONCE
Status: CANCELLED | OUTPATIENT
Start: 2020-09-29

## 2020-09-29 RX ORDER — SODIUM CHLORIDE 9 MG/ML
INJECTION, SOLUTION INTRAVENOUS ONCE
Status: DISCONTINUED | OUTPATIENT
Start: 2020-09-29 | End: 2020-09-30 | Stop reason: HOSPADM

## 2020-09-29 RX ORDER — SODIUM CHLORIDE 0.9 % (FLUSH) 0.9 %
10 SYRINGE (ML) INJECTION PRN
Status: CANCELLED | OUTPATIENT
Start: 2020-09-29

## 2020-09-29 RX ORDER — ATROPINE SULFATE 0.4 MG/ML
0.4 AMPUL (ML) INJECTION
Status: COMPLETED | OUTPATIENT
Start: 2020-09-29 | End: 2020-09-29

## 2020-09-29 RX ORDER — PALONOSETRON 0.05 MG/ML
0.25 INJECTION, SOLUTION INTRAVENOUS ONCE
Status: COMPLETED | OUTPATIENT
Start: 2020-09-29 | End: 2020-09-29

## 2020-09-29 RX ORDER — SODIUM CHLORIDE 9 MG/ML
INJECTION, SOLUTION INTRAVENOUS CONTINUOUS
Status: CANCELLED | OUTPATIENT
Start: 2020-09-29

## 2020-09-29 RX ADMIN — DEXAMETHASONE SODIUM PHOSPHATE 12 MG: 4 INJECTION, SOLUTION INTRAMUSCULAR; INTRAVENOUS at 09:41

## 2020-09-29 RX ADMIN — SODIUM CHLORIDE: 9 INJECTION, SOLUTION INTRAVENOUS at 09:34

## 2020-09-29 RX ADMIN — PANITUMUMAB 390 MG: 100 SOLUTION INTRAVENOUS at 10:13

## 2020-09-29 RX ADMIN — LEUCOVORIN CALCIUM 200 MG: 200 INJECTION, POWDER, LYOPHILIZED, FOR SUSPENSION INTRAMUSCULAR; INTRAVENOUS at 10:54

## 2020-09-29 RX ADMIN — FLUOROURACIL 600 MG: 50 INJECTION, SOLUTION INTRAVENOUS at 12:46

## 2020-09-29 RX ADMIN — IRINOTECAN HYDROCHLORIDE 160 MG: 20 INJECTION, SOLUTION INTRAVENOUS at 10:54

## 2020-09-29 RX ADMIN — PALONOSETRON 0.25 MG: 0.25 INJECTION, SOLUTION INTRAVENOUS at 09:36

## 2020-09-29 RX ADMIN — ATROPINE SULFATE 0.4 MG: 0.4 INJECTION, SOLUTION INTRAMUSCULAR; INTRAVENOUS; SUBCUTANEOUS at 09:39

## 2020-09-29 NOTE — PROGRESS NOTES
Patient ambulated to treatment room. Gait steady. Denies any pain. No SOB noted. C/O some fatigue. Appetite has been okay with some taste changes. States she is drinking 2 ensures everyday and is feeling better. Occasional diarrhea stools. Labs done 9/28. Chemo given as ordered. CIV pump started at 1311. RTC 10/1 for pump d/c.

## 2020-10-01 ENCOUNTER — HOSPITAL ENCOUNTER (OUTPATIENT)
Dept: INFUSION THERAPY | Age: 75
Discharge: HOME | End: 2020-10-01
Payer: MEDICARE

## 2020-10-01 DIAGNOSIS — C18.7 MALIGNANT NEOPLASM OF SIGMOID COLON (HCC): Primary | ICD-10-CM

## 2020-10-01 PROCEDURE — 96523 IRRIG DRUG DELIVERY DEVICE: CPT

## 2020-10-01 PROCEDURE — 2580000003 HC RX 258: Performed by: INTERNAL MEDICINE

## 2020-10-01 PROCEDURE — 6360000002 HC RX W HCPCS: Performed by: INTERNAL MEDICINE

## 2020-10-01 RX ORDER — SODIUM CHLORIDE 0.9 % (FLUSH) 0.9 %
20 SYRINGE (ML) INJECTION PRN
Status: CANCELLED | OUTPATIENT
Start: 2020-10-01

## 2020-10-01 RX ORDER — HEPARIN SODIUM (PORCINE) LOCK FLUSH IV SOLN 100 UNIT/ML 100 UNIT/ML
500 SOLUTION INTRAVENOUS PRN
Status: DISCONTINUED | OUTPATIENT
Start: 2020-10-01 | End: 2020-10-02 | Stop reason: HOSPADM

## 2020-10-01 RX ORDER — SODIUM CHLORIDE 0.9 % (FLUSH) 0.9 %
10 SYRINGE (ML) INJECTION PRN
Status: CANCELLED | OUTPATIENT
Start: 2020-10-01

## 2020-10-01 RX ORDER — HEPARIN SODIUM (PORCINE) LOCK FLUSH IV SOLN 100 UNIT/ML 100 UNIT/ML
500 SOLUTION INTRAVENOUS PRN
Status: CANCELLED | OUTPATIENT
Start: 2020-10-01

## 2020-10-01 RX ORDER — SODIUM CHLORIDE 0.9 % (FLUSH) 0.9 %
10 SYRINGE (ML) INJECTION PRN
Status: DISCONTINUED | OUTPATIENT
Start: 2020-10-01 | End: 2020-10-02 | Stop reason: HOSPADM

## 2020-10-01 RX ADMIN — Medication 500 UNITS: at 12:06

## 2020-10-01 RX ADMIN — SODIUM CHLORIDE, PRESERVATIVE FREE 10 ML: 5 INJECTION INTRAVENOUS at 12:06

## 2020-10-04 NOTE — PROGRESS NOTES
Patient Name: Job Hathaway  Patient : 1945  Patient MRN: [id number]     Primary Oncologist: Susana Beck MD  Referring Provider: Pasquale Mcdonald MD     Date of Service: 10/9/2020      Chief Complaint:   Chief Complaint   Patient presents with    Follow-up     Lightheaded, and sores in mouth     Patient Active Problem List:     Malignant neoplasm of sigmoid colon      Pleural effusion, right     Colon cancer metastasized to lung     Anemia     Drug induced neutropenia    HPI:   Ms. 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 She initially presented to [facility] on 2017, with nausea and vomiting. She had a CT angiogram of the abdomen on admission, 2017, and it showed irregular, asymmetric wall thickening involving a 4 cm segment of sigmoid colon with somewhat nodular infiltration of adjacent fat. Dilatation proximally which is most prominent involving the colon at the hepatic flexure where it measures up to 8.9 cm. Findings are concerning for obstructing sigmoid neoplasm. She also has a mildly enlarged lymph node anterior to the distal iliac vasculature. She was subsequently evaluated by Dr. Nayeli Zapata and she underwent exploratory laparotomy, sigmoid colon resection with end colostomy, bladder dome resection and closure, segment of small bowel resection, on 2017. Final pathology revealed low-grade invasive adenocarcinoma. Chronic inflammation and fibrosis with focal abscess associated with invasive adenocarcinoma. Small segment of small intestine revealed mild chronic nonspecific mucosal enteritis.   Tumor size was 4.3 x 4.5 cm in diameter and all the surgical margins were negative. Lymphovascular invasion and perineural invasion were present. Eight out of 11 lymph nodes examined were positive for metastasis (pT3 pN2b pMx). Her CEA level on October 8, 2017, was 6.5 ng/mL. She was discharged from the hospital with an appointment to see me as an outpatient. After a long discussion with Ms. Tanvir Stacy, she has decided to have adjuvant chemotherapy with FOLFOX for her stage IIIC colon cancer. Adjuvant chemotherapy with FOLFOX was started on November 28, 2017 and we stopped it on April 24, 2018 (total 10 treatments), after she experienced syncope attack after last treatment. We decided to follow her closely. She underwent colonoscopy by Dr. Margaret Dakin on January 24, 2018 and it was a normal study according to her. She has CT scan of the chest, abdomen and pelvis on October 15, 2018 and it showed postsurgical changes from partial colectomy with left lower quadrant colostomy. Bilateral 2-6 mm pulmonary nodules, new since 2014, one of which has slightly increased in size since 2017 in the left upper lobe. These are suspicious for metastatic disease. Grossly stable 1.2 cm hypoenhancing lesion in segment VII of liver which was less conspicuous on the prior exam. Given stability, findings favor benign process. No new findings of metastatic disease in the abdomen or pelvis. Cholelithiasis. CT scan of the chest done on 4/10/19 showed increased size and number bilateral pulmonary metastases. At least 4 liver lesions as above, at least 1 appearing unchanged since 07/28/2014. Metastatic disease, primary malignancy, and benign processes such as cysts and hemangioma should be considered. Recommend further evaluation with liver protocol abdomen MRI (using a nonhepatobiliary contrast agent) or CT. She underwent CT guided biopsy of the lung nodules on 4/24/19 and final pathology showed metastatic adenocarcinoma, consistent with a colorectal primary.      KRAS, NRAS, BRAF study were negative. MSI reveals stable disease. Since she is found to have metastatic colon cancer, we started first line chemotherapy with FOLFOX and panitumumab since 5/22/19. We stopped it on July 1, 2020 since she has progression of the disease. Second line chemotherapy with FOLFIRI and panitumumab was started on 7/15/20. We stopped it after 9/29/20 therapy since she is found to have progression of the disease. She couldn't handle well on FOLFIRI and panitumumab. CT scan on 10/5/20 showed overall significant progression of metastatic disease. Decision was made to rechallenge with FOLFOX along with avastin since she never receives avastin before. Will plan to start it on 10/13/20. On October 5, 2020, she presented to me for follow up. I have been following Ms. Abigail Yoder for stage IIIC low-grade invasive sigmoid colon adenocarcinoma and she is status post surgery followed by adjuvant chemotherapy with FOLFOX. CT scan on 4/10/19 showed increase in size and numbers of bilateral pulmonary nodules. CEA done the same day also showed increasing (52.9). Subsequently underwent CT guided biopsy of lung nodules on 4/24/19 and final pathology showed metastatic adenocarcinoma consistent with colorectal primary. All MELISSA study, BRAF were negative and MSI studies showed she has MSI stable disease. First line chemotherapy with FOLFOX and panitumumab was started on 5/22/19 and we stopped it on July 1, 2020 since she is found to have progression of the disease. Second chemotherapy with FOLFIRI and Panitumumab was started on July 15, 2020. We stopped it after 9/29/20 therapy since she is found to have progression of the disease. She couldn't handle well on FOLFIRI and panitumumab. CT scan on 10/5/20 showed overall significant progression of metastatic disease. Decision was made to rechallenge with FOLFOX along with avastin since she never receives avastin before.  Will plan to start it on Exam:  CONSTITUTIONAL: awake, alert, cooperative, no apparent distress   EYES: pupils equal, round and reactive to light, sclera clear and conjunctiva normal  ENT: Normocephalic, without obvious abnormality, atraumatic  NECK: supple, symmetrical, no jugular venous distension and no carotid bruits   HEMATOLOGIC/LYMPHATIC: no cervical, supraclavicular or axillary lymphadenopathy   LUNGS: no wheezes, no crackles, no crackles, VBS, no increased work of breathing and clear to auscultation   CARDIOVASCULAR: regular rate and rhythm, normal S1 and S2, no murmur noted  ABDOMEN:  soft, non-distended, normal bowel sounds x 4, non-tender, no masses palpated, no hepatosplenomegaly   MUSCULOSKELETAL: full range of motion noted, tone is normal  NEUROLOGIC: awake, alert, oriented to name, place and time. Motor skills grossly intact. SKIN: Normal skin color, texture, turgor and no jaundice.  appears intact   EXTREMITIES: no LE edema, no clubbing, no leg swelling, no cyanosis     Labs:  Hematology:  Lab Results   Component Value Date    WBC 2.9 (L) 09/25/2020    RBC 3.33 (L) 09/25/2020    HGB 9.4 (L) 09/25/2020    HCT 29.8 (L) 09/25/2020    MCV 89.5 09/25/2020    MCH 28.2 09/25/2020    MCHC 31.5 (L) 09/25/2020    RDW 14.4 09/25/2020     09/25/2020    MPV 10.5 09/25/2020    BANDSPCT 13 (H) 10/08/2017    SEGSPCT 59.8 09/25/2020    EOSRELPCT 1.4 09/25/2020    BASOPCT 0.3 09/25/2020    LYMPHOPCT 32.0 09/25/2020    MONOPCT 6.5 (H) 09/25/2020    BANDABS 0.56 10/08/2017    SEGSABS 1.7 09/25/2020    EOSABS 0.0 09/25/2020    BASOSABS 0.0 09/25/2020    LYMPHSABS 0.9 09/25/2020    MONOSABS 0.2 09/25/2020    DIFFTYPE AUTOMATED DIFFERENTIAL 09/25/2020     No results found for: ESR  Chemistry:  Lab Results   Component Value Date     09/25/2020    K 4.0 09/25/2020     09/25/2020    CO2 28 09/25/2020    BUN 19 09/25/2020    CREATININE 1.0 09/25/2020    GLUCOSE 136 (H) 09/25/2020    CALCIUM 9.4 09/25/2020    PROT 6.6 09/25/2020 LABALBU 3.6 09/25/2020    BILITOT 0.5 09/25/2020    ALKPHOS 499 (H) 09/25/2020    AST 38 (H) 09/25/2020    ALT 18 09/25/2020    LABGLOM 54 (L) 09/25/2020    GFRAA >60 09/25/2020    PHOS 4.7 11/12/2019    MG 2.4 01/06/2020    POCCA 1.23 09/14/2020    POCGLU 110 (H) 09/14/2020     No results found for: MMA, LDH, HOMOCYSTEINE  No components found for: LD  No results found for: TSHHS, T4FREE, FT3  Immunology:  Lab Results   Component Value Date    PROT 6.6 09/25/2020     No results found for: [place], KLFLCR  No results found for: B2M  Coagulation Panel:  Lab Results   Component Value Date    PROTIME 11.6 (L) 02/24/2020    INR 1.02 02/24/2020    APTT 19.3 (L) 02/24/2020     Anemia Panel:  Lab Results   Component Value Date    NGHTZWBM24 306.6 02/03/2020    FOLATE >20.0 (H) 02/03/2020     Tumor Markers:  Lab Results   Component Value Date    .9 09/25/2020     Observations:  PHQ-9 Total Score: 0 (10/9/2020  9:25 AM)      Assessment & Plan:   Stage IIIC low-grade invasive sigmoid colon adenocarcinoma. PLAN:  Overall Ms. Sanchez Madrid is feeling quite well and does not have any significant new symptoms on today's visit. I have been following Ms. Sanchez Madrid for stage IIIC low-grade invasive sigmoid colon adenocarcinoma and she is status post surgery followed by adjuvant chemotherapy with FOLFOX. CT scan on 4/10/19 showed increase in size and numbers of bilateral pulmonary nodules. CEA done the same day also showed increasing (52.9). Subsequently underwent CT guided biopsy of lung nodules on 4/24/19 and final pathology showed metastatic adenocarcinoma consistent with colorectal primary. All MELISSA study, BRAF were negative and MSI studies showed she has MSI stable disease. First line chemotherapy with FOLFOX and panitumumab was started on 5/22/19 and we stopped it on July 1, 2020 since she is found to have progression of the disease.      Second chemotherapy with FOLFIRI and Panitumumab was started on July 15, 2020. We stopped it after 9/29/20 therapy since she is found to have progression of the disease. She couldn't handle well on FOLFIRI and panitumumab. CT scan on 10/5/20 showed overall significant progression of metastatic disease. Decision was made to rechallenge with FOLFOX along with avastin since she never receives avastin before. Will plan to start it on 10/13/20. I will continue to monitor her CEA closely and will consider to have repeat scans in 3 to 4 months. If she fails to respond to FOLFOX and avastin, will consider using either lonsurf or stivarga. She still has issue with diarrhea due to chemotherapy. I recommend her to continue with lomotil as needed basis. I have reviewed all these plans with Ms. Ruben Barnes and she is in agreement with the plans. I answered all her questions and concerns for today. I asked her to follow up with her  primary care physician on a regular basis. I will continue to keep you updated on her progress. Thank you for allowing me to participate in the care of this very pleasant patient. Recent imaging and labs were reviewed and discussed with the patient.       Electronically signed by Marcio Rene MD on 8/23/20 at 9:00 PM EDT

## 2020-10-05 ENCOUNTER — HOSPITAL ENCOUNTER (OUTPATIENT)
Dept: CT IMAGING | Age: 75
Discharge: HOME | End: 2020-10-05
Payer: MEDICARE

## 2020-10-05 PROCEDURE — 6360000004 HC RX CONTRAST MEDICATION: Performed by: INTERNAL MEDICINE

## 2020-10-05 PROCEDURE — 74177 CT ABD & PELVIS W/CONTRAST: CPT

## 2020-10-05 PROCEDURE — 70460 CT HEAD/BRAIN W/DYE: CPT

## 2020-10-05 RX ADMIN — IOPAMIDOL 100 ML: 755 INJECTION, SOLUTION INTRAVENOUS at 10:54

## 2020-10-05 RX ADMIN — IOHEXOL 50 ML: 240 INJECTION, SOLUTION INTRATHECAL; INTRAVASCULAR; INTRAVENOUS; ORAL at 10:54

## 2020-10-09 ENCOUNTER — OFFICE VISIT (OUTPATIENT)
Dept: ONCOLOGY | Age: 75
End: 2020-10-09
Payer: MEDICARE

## 2020-10-09 ENCOUNTER — HOSPITAL ENCOUNTER (OUTPATIENT)
Dept: INFUSION THERAPY | Age: 75
Discharge: HOME | End: 2020-10-09
Payer: MEDICARE

## 2020-10-09 VITALS
WEIGHT: 127.2 LBS | BODY MASS INDEX: 21.19 KG/M2 | TEMPERATURE: 98.4 F | DIASTOLIC BLOOD PRESSURE: 59 MMHG | SYSTOLIC BLOOD PRESSURE: 111 MMHG | OXYGEN SATURATION: 97 % | HEART RATE: 76 BPM | HEIGHT: 65 IN

## 2020-10-09 PROBLEM — C78.7 LIVER METASTASIS: Status: ACTIVE | Noted: 2020-10-09

## 2020-10-09 PROCEDURE — G8400 PT W/DXA NO RESULTS DOC: HCPCS | Performed by: INTERNAL MEDICINE

## 2020-10-09 PROCEDURE — 1090F PRES/ABSN URINE INCON ASSESS: CPT | Performed by: INTERNAL MEDICINE

## 2020-10-09 PROCEDURE — G8427 DOCREV CUR MEDS BY ELIG CLIN: HCPCS | Performed by: INTERNAL MEDICINE

## 2020-10-09 PROCEDURE — 99214 OFFICE O/P EST MOD 30 MIN: CPT | Performed by: INTERNAL MEDICINE

## 2020-10-09 PROCEDURE — 1036F TOBACCO NON-USER: CPT | Performed by: INTERNAL MEDICINE

## 2020-10-09 PROCEDURE — G8420 CALC BMI NORM PARAMETERS: HCPCS | Performed by: INTERNAL MEDICINE

## 2020-10-09 PROCEDURE — 1123F ACP DISCUSS/DSCN MKR DOCD: CPT | Performed by: INTERNAL MEDICINE

## 2020-10-09 PROCEDURE — 99211 OFF/OP EST MAY X REQ PHY/QHP: CPT

## 2020-10-09 PROCEDURE — 4040F PNEUMOC VAC/ADMIN/RCVD: CPT | Performed by: INTERNAL MEDICINE

## 2020-10-09 PROCEDURE — 3017F COLORECTAL CA SCREEN DOC REV: CPT | Performed by: INTERNAL MEDICINE

## 2020-10-09 PROCEDURE — G8484 FLU IMMUNIZE NO ADMIN: HCPCS | Performed by: INTERNAL MEDICINE

## 2020-10-09 RX ORDER — EPINEPHRINE 1 MG/ML
0.3 INJECTION, SOLUTION, CONCENTRATE INTRAVENOUS PRN
Status: CANCELLED | OUTPATIENT
Start: 2020-10-20

## 2020-10-09 RX ORDER — FLUOROURACIL 50 MG/ML
400 INJECTION, SOLUTION INTRAVENOUS ONCE
Status: CANCELLED | OUTPATIENT
Start: 2020-10-13

## 2020-10-09 RX ORDER — MEPERIDINE HYDROCHLORIDE 50 MG/ML
12.5 INJECTION INTRAMUSCULAR; INTRAVENOUS; SUBCUTANEOUS ONCE
Status: CANCELLED | OUTPATIENT
Start: 2020-10-20

## 2020-10-09 RX ORDER — DIPHENHYDRAMINE HYDROCHLORIDE 50 MG/ML
50 INJECTION INTRAMUSCULAR; INTRAVENOUS ONCE
Status: CANCELLED | OUTPATIENT
Start: 2020-10-20

## 2020-10-09 RX ORDER — SODIUM CHLORIDE 9 MG/ML
INJECTION, SOLUTION INTRAVENOUS ONCE
Status: CANCELLED | OUTPATIENT
Start: 2020-10-20

## 2020-10-09 RX ORDER — SODIUM CHLORIDE 0.9 % (FLUSH) 0.9 %
5 SYRINGE (ML) INJECTION PRN
Status: CANCELLED | OUTPATIENT
Start: 2020-10-20

## 2020-10-09 RX ORDER — HEPARIN SODIUM (PORCINE) LOCK FLUSH IV SOLN 100 UNIT/ML 100 UNIT/ML
500 SOLUTION INTRAVENOUS PRN
Status: CANCELLED | OUTPATIENT
Start: 2020-10-20

## 2020-10-09 RX ORDER — PALONOSETRON 0.05 MG/ML
0.25 INJECTION, SOLUTION INTRAVENOUS ONCE
Status: CANCELLED | OUTPATIENT
Start: 2020-10-20

## 2020-10-09 RX ORDER — SODIUM CHLORIDE 9 MG/ML
INJECTION, SOLUTION INTRAVENOUS CONTINUOUS
Status: CANCELLED | OUTPATIENT
Start: 2020-10-20

## 2020-10-09 RX ORDER — ONDANSETRON HYDROCHLORIDE 8 MG/1
TABLET, FILM COATED ORAL
COMMUNITY
Start: 2020-10-01 | End: 2020-10-20 | Stop reason: SDUPTHER

## 2020-10-09 RX ORDER — METHYLPREDNISOLONE SODIUM SUCCINATE 125 MG/2ML
125 INJECTION, POWDER, LYOPHILIZED, FOR SOLUTION INTRAMUSCULAR; INTRAVENOUS ONCE
Status: CANCELLED | OUTPATIENT
Start: 2020-10-20

## 2020-10-09 RX ORDER — DEXTROSE MONOHYDRATE 50 MG/ML
INJECTION, SOLUTION INTRAVENOUS ONCE
Status: CANCELLED | OUTPATIENT
Start: 2020-10-20

## 2020-10-09 RX ORDER — SODIUM CHLORIDE 0.9 % (FLUSH) 0.9 %
10 SYRINGE (ML) INJECTION PRN
Status: CANCELLED | OUTPATIENT
Start: 2020-10-20

## 2020-10-09 ASSESSMENT — PATIENT HEALTH QUESTIONNAIRE - PHQ9
SUM OF ALL RESPONSES TO PHQ9 QUESTIONS 1 & 2: 0
SUM OF ALL RESPONSES TO PHQ QUESTIONS 1-9: 0
SUM OF ALL RESPONSES TO PHQ QUESTIONS 1-9: 0
2. FEELING DOWN, DEPRESSED OR HOPELESS: 0
1. LITTLE INTEREST OR PLEASURE IN DOING THINGS: 0

## 2020-10-12 ENCOUNTER — HOSPITAL ENCOUNTER (OUTPATIENT)
Dept: INFUSION THERAPY | Age: 75
Discharge: HOME | End: 2020-10-12
Payer: MEDICARE

## 2020-10-12 ENCOUNTER — TELEPHONE (OUTPATIENT)
Dept: INFUSION THERAPY | Age: 75
End: 2020-10-12

## 2020-10-12 DIAGNOSIS — C18.9 COLON CANCER METASTASIZED TO LUNG (HCC): ICD-10-CM

## 2020-10-12 DIAGNOSIS — C78.00 COLON CANCER METASTASIZED TO LUNG (HCC): ICD-10-CM

## 2020-10-12 DIAGNOSIS — C18.7 MALIGNANT NEOPLASM OF SIGMOID COLON (HCC): ICD-10-CM

## 2020-10-12 LAB
ALBUMIN SERPL-MCNC: 3.7 GM/DL (ref 3.4–5)
ALP BLD-CCNC: 368 IU/L (ref 40–128)
ALT SERPL-CCNC: 18 U/L (ref 10–40)
ANION GAP SERPL CALCULATED.3IONS-SCNC: 12 MMOL/L (ref 4–16)
AST SERPL-CCNC: 33 IU/L (ref 15–37)
BASOPHILS ABSOLUTE: 0 K/CU MM
BASOPHILS RELATIVE PERCENT: 0.5 % (ref 0–1)
BILIRUB SERPL-MCNC: 0.4 MG/DL (ref 0–1)
BUN BLDV-MCNC: 12 MG/DL (ref 6–23)
CALCIUM SERPL-MCNC: 9.2 MG/DL (ref 8.3–10.6)
CEA: 50.5 NG/ML
CHLORIDE BLD-SCNC: 105 MMOL/L (ref 99–110)
CO2: 27 MMOL/L (ref 21–32)
CREAT SERPL-MCNC: 1 MG/DL (ref 0.6–1.1)
DIFFERENTIAL TYPE: ABNORMAL
EOSINOPHILS ABSOLUTE: 0 K/CU MM
EOSINOPHILS RELATIVE PERCENT: 0.5 % (ref 0–3)
GFR AFRICAN AMERICAN: >60 ML/MIN/1.73M2
GFR AFRICAN AMERICAN: >60 ML/MIN/1.73M2
GFR NON-AFRICAN AMERICAN: 54 ML/MIN/1.73M2
GFR NON-AFRICAN AMERICAN: 57 ML/MIN/1.73M2
GLUCOSE BLD-MCNC: 137 MG/DL (ref 70–99)
GLUCOSE BLD-MCNC: 142 MG/DL (ref 70–99)
HCT VFR BLD CALC: 26.2 % (ref 37–47)
HEMOGLOBIN: 8.2 GM/DL (ref 12.5–16)
LYMPHOCYTES ABSOLUTE: 0.9 K/CU MM
LYMPHOCYTES RELATIVE PERCENT: 45.9 % (ref 24–44)
MCH RBC QN AUTO: 28.1 PG (ref 27–31)
MCHC RBC AUTO-ENTMCNC: 31.3 % (ref 32–36)
MCV RBC AUTO: 89.7 FL (ref 78–100)
MONOCYTES ABSOLUTE: 0.4 K/CU MM
MONOCYTES RELATIVE PERCENT: 22.2 % (ref 0–4)
PDW BLD-RTO: 15.6 % (ref 11.7–14.9)
PLATELET # BLD: 202 K/CU MM (ref 140–440)
PMV BLD AUTO: 10 FL (ref 7.5–11.1)
POC CALCIUM: 1.16 MMOL/L (ref 1.12–1.32)
POC CHLORIDE: 105 MMOL/L (ref 98–109)
POC CREATININE: 1 MG/DL (ref 0.6–1.1)
POTASSIUM SERPL-SCNC: 3.6 MMOL/L (ref 3.6–5.1)
POTASSIUM SERPL-SCNC: 4 MMOL/L (ref 3.5–5.1)
RBC # BLD: 2.92 M/CU MM (ref 4.2–5.4)
SEGMENTED NEUTROPHILS ABSOLUTE COUNT: 0.6 K/CU MM
SEGMENTED NEUTROPHILS RELATIVE PERCENT: 30.9 % (ref 36–66)
SODIUM BLD-SCNC: 142 MMOL/L (ref 136–145)
SODIUM BLD-SCNC: 144 MMOL/L (ref 135–145)
TOTAL PROTEIN: 6.3 GM/DL (ref 6.4–8.2)
WBC # BLD: 1.9 K/CU MM (ref 4–10.5)

## 2020-10-12 PROCEDURE — 85025 COMPLETE CBC W/AUTO DIFF WBC: CPT

## 2020-10-12 PROCEDURE — 82378 CARCINOEMBRYONIC ANTIGEN: CPT

## 2020-10-12 PROCEDURE — 80053 COMPREHEN METABOLIC PANEL: CPT

## 2020-10-12 PROCEDURE — 36415 COLL VENOUS BLD VENIPUNCTURE: CPT

## 2020-10-19 ENCOUNTER — HOSPITAL ENCOUNTER (OUTPATIENT)
Dept: INFUSION THERAPY | Age: 75
Discharge: HOME | End: 2020-10-19
Payer: MEDICARE

## 2020-10-19 DIAGNOSIS — C78.00 COLON CANCER METASTASIZED TO LUNG (HCC): ICD-10-CM

## 2020-10-19 DIAGNOSIS — C18.9 COLON CANCER METASTASIZED TO LUNG (HCC): ICD-10-CM

## 2020-10-19 LAB
ALBUMIN SERPL-MCNC: 3.6 GM/DL (ref 3.4–5)
ALP BLD-CCNC: 353 IU/L (ref 40–128)
ALT SERPL-CCNC: 20 U/L (ref 10–40)
ANION GAP SERPL CALCULATED.3IONS-SCNC: 11 MMOL/L (ref 4–16)
AST SERPL-CCNC: 43 IU/L (ref 15–37)
BASOPHILS ABSOLUTE: 0 K/CU MM
BASOPHILS RELATIVE PERCENT: 0.5 % (ref 0–1)
BILIRUB SERPL-MCNC: 0.4 MG/DL (ref 0–1)
BUN BLDV-MCNC: 9 MG/DL (ref 6–23)
CALCIUM SERPL-MCNC: 9.2 MG/DL (ref 8.3–10.6)
CHLORIDE BLD-SCNC: 105 MMOL/L (ref 99–110)
CO2: 26 MMOL/L (ref 21–32)
CREAT SERPL-MCNC: 0.9 MG/DL (ref 0.6–1.1)
DIFFERENTIAL TYPE: ABNORMAL
EOSINOPHILS ABSOLUTE: 0.1 K/CU MM
EOSINOPHILS RELATIVE PERCENT: 2.1 % (ref 0–3)
GFR AFRICAN AMERICAN: >60 ML/MIN/1.73M2
GFR AFRICAN AMERICAN: >60 ML/MIN/1.73M2
GFR NON-AFRICAN AMERICAN: 58 ML/MIN/1.73M2
GFR NON-AFRICAN AMERICAN: >60 ML/MIN/1.73M2
GLUCOSE BLD-MCNC: 134 MG/DL (ref 70–99)
GLUCOSE BLD-MCNC: 139 MG/DL (ref 70–99)
HCT VFR BLD CALC: 29.3 % (ref 37–47)
HEMOGLOBIN: 9.2 GM/DL (ref 12.5–16)
LYMPHOCYTES ABSOLUTE: 1.1 K/CU MM
LYMPHOCYTES RELATIVE PERCENT: 28.4 % (ref 24–44)
MCH RBC QN AUTO: 28.6 PG (ref 27–31)
MCHC RBC AUTO-ENTMCNC: 31.4 % (ref 32–36)
MCV RBC AUTO: 91 FL (ref 78–100)
MONOCYTES ABSOLUTE: 0.9 K/CU MM
MONOCYTES RELATIVE PERCENT: 24.7 % (ref 0–4)
PDW BLD-RTO: 17.2 % (ref 11.7–14.9)
PLATELET # BLD: 193 K/CU MM (ref 140–440)
PMV BLD AUTO: 10.4 FL (ref 7.5–11.1)
POC CALCIUM: 1.17 MMOL/L (ref 1.12–1.32)
POC CHLORIDE: 108 MMOL/L (ref 98–109)
POC CREATININE: 1 MG/DL (ref 0.6–1.1)
POTASSIUM SERPL-SCNC: 3.4 MMOL/L (ref 3.6–5.1)
POTASSIUM SERPL-SCNC: 3.8 MMOL/L (ref 3.5–5.1)
RBC # BLD: 3.22 M/CU MM (ref 4.2–5.4)
SEGMENTED NEUTROPHILS ABSOLUTE COUNT: 1.7 K/CU MM
SEGMENTED NEUTROPHILS RELATIVE PERCENT: 44.3 % (ref 36–66)
SODIUM BLD-SCNC: 142 MMOL/L (ref 135–145)
SODIUM BLD-SCNC: 145 MMOL/L (ref 136–145)
SOURCE, BLOOD GAS: ABNORMAL
TOTAL PROTEIN: 6.4 GM/DL (ref 6.4–8.2)
WBC # BLD: 3.8 K/CU MM (ref 4–10.5)

## 2020-10-19 PROCEDURE — 80053 COMPREHEN METABOLIC PANEL: CPT

## 2020-10-19 PROCEDURE — 36415 COLL VENOUS BLD VENIPUNCTURE: CPT

## 2020-10-19 PROCEDURE — 85025 COMPLETE CBC W/AUTO DIFF WBC: CPT

## 2020-10-20 ENCOUNTER — HOSPITAL ENCOUNTER (OUTPATIENT)
Dept: INFUSION THERAPY | Age: 75
Discharge: HOME | End: 2020-10-20
Payer: MEDICARE

## 2020-10-20 ENCOUNTER — CLINICAL DOCUMENTATION (OUTPATIENT)
Dept: ONCOLOGY | Age: 75
End: 2020-10-20

## 2020-10-20 VITALS
OXYGEN SATURATION: 96 % | SYSTOLIC BLOOD PRESSURE: 131 MMHG | TEMPERATURE: 99.1 F | HEART RATE: 84 BPM | DIASTOLIC BLOOD PRESSURE: 67 MMHG | BODY MASS INDEX: 21.5 KG/M2 | WEIGHT: 129.2 LBS

## 2020-10-20 DIAGNOSIS — C18.7 MALIGNANT NEOPLASM OF SIGMOID COLON (HCC): Primary | ICD-10-CM

## 2020-10-20 PROCEDURE — 96411 CHEMO IV PUSH ADDL DRUG: CPT

## 2020-10-20 PROCEDURE — 96374 THER/PROPH/DIAG INJ IV PUSH: CPT

## 2020-10-20 PROCEDURE — 96415 CHEMO IV INFUSION ADDL HR: CPT

## 2020-10-20 PROCEDURE — 96368 THER/DIAG CONCURRENT INF: CPT

## 2020-10-20 PROCEDURE — 96413 CHEMO IV INFUSION 1 HR: CPT

## 2020-10-20 PROCEDURE — 96417 CHEMO IV INFUS EACH ADDL SEQ: CPT

## 2020-10-20 PROCEDURE — 96375 TX/PRO/DX INJ NEW DRUG ADDON: CPT

## 2020-10-20 PROCEDURE — 6360000002 HC RX W HCPCS: Performed by: INTERNAL MEDICINE

## 2020-10-20 PROCEDURE — 2580000003 HC RX 258: Performed by: INTERNAL MEDICINE

## 2020-10-20 RX ORDER — DEXAMETHASONE 4 MG/1
4 TABLET ORAL DAILY
Qty: 32 TABLET | Refills: 1 | Status: ON HOLD | OUTPATIENT
Start: 2020-10-20 | End: 2020-12-18 | Stop reason: HOSPADM

## 2020-10-20 RX ORDER — SODIUM CHLORIDE 9 MG/ML
INJECTION, SOLUTION INTRAVENOUS ONCE
Status: DISCONTINUED | OUTPATIENT
Start: 2020-10-20 | End: 2020-10-21 | Stop reason: HOSPADM

## 2020-10-20 RX ORDER — DEXTROSE MONOHYDRATE 50 MG/ML
INJECTION, SOLUTION INTRAVENOUS ONCE
Status: DISCONTINUED | OUTPATIENT
Start: 2020-10-20 | End: 2020-10-21 | Stop reason: HOSPADM

## 2020-10-20 RX ORDER — LISINOPRIL 20 MG/1
20 TABLET ORAL DAILY
Qty: 30 TABLET | Refills: 2 | Status: ON HOLD | OUTPATIENT
Start: 2020-10-20 | End: 2020-12-18 | Stop reason: HOSPADM

## 2020-10-20 RX ORDER — ONDANSETRON HYDROCHLORIDE 8 MG/1
TABLET, FILM COATED ORAL
Qty: 30 TABLET | Refills: 1 | Status: SHIPPED | OUTPATIENT
Start: 2020-10-20

## 2020-10-20 RX ORDER — PALONOSETRON 0.05 MG/ML
0.25 INJECTION, SOLUTION INTRAVENOUS ONCE
Status: COMPLETED | OUTPATIENT
Start: 2020-10-20 | End: 2020-10-20

## 2020-10-20 RX ADMIN — SODIUM CHLORIDE 577.5 MG: 9 INJECTION, SOLUTION INTRAVENOUS at 09:04

## 2020-10-20 RX ADMIN — OXALIPLATIN 138.5 MG: 5 INJECTION, SOLUTION INTRAVENOUS at 10:49

## 2020-10-20 RX ADMIN — PALONOSETRON 0.25 MG: 0.25 INJECTION, SOLUTION INTRAVENOUS at 08:45

## 2020-10-20 RX ADMIN — DEXTROSE MONOHYDRATE: 50 INJECTION, SOLUTION INTRAVENOUS at 09:00

## 2020-10-20 RX ADMIN — FLUOROURACIL 650 MG: 50 INJECTION, SOLUTION INTRAVENOUS at 13:17

## 2020-10-20 RX ADMIN — DEXAMETHASONE SODIUM PHOSPHATE 12 MG: 4 INJECTION, SOLUTION INTRAMUSCULAR; INTRAVENOUS at 08:46

## 2020-10-20 RX ADMIN — LEUCOVORIN CALCIUM 200 MG: 200 INJECTION, POWDER, LYOPHILIZED, FOR SUSPENSION INTRAMUSCULAR; INTRAVENOUS at 10:49

## 2020-10-20 ASSESSMENT — PAIN SCALES - GENERAL: PAINLEVEL_OUTOF10: 0

## 2020-10-20 NOTE — PROGRESS NOTES
Patient arrived for treatment planning and chemotherapy education. Discussed treatment plan, potential side effects, prevention, and symptom management. Reviewed chemotherapy education folder and drug monograph. Patient signed chemotherapy consent foe FOLFOX and Avastin  Patient has received FOLFOX in the past.    Rx called in for steroid (dexamethasone) and Zofran. Patient here for chemotherapy today.

## 2020-10-22 ENCOUNTER — HOSPITAL ENCOUNTER (OUTPATIENT)
Dept: INFUSION THERAPY | Age: 75
Discharge: HOME | End: 2020-10-22
Payer: MEDICARE

## 2020-10-22 DIAGNOSIS — C18.7 MALIGNANT NEOPLASM OF SIGMOID COLON (HCC): Primary | ICD-10-CM

## 2020-10-22 PROCEDURE — 6360000002 HC RX W HCPCS: Performed by: INTERNAL MEDICINE

## 2020-10-22 PROCEDURE — 96523 IRRIG DRUG DELIVERY DEVICE: CPT

## 2020-10-22 PROCEDURE — 2580000003 HC RX 258: Performed by: INTERNAL MEDICINE

## 2020-10-22 RX ORDER — SODIUM CHLORIDE 0.9 % (FLUSH) 0.9 %
20 SYRINGE (ML) INJECTION PRN
Status: DISCONTINUED | OUTPATIENT
Start: 2020-10-22 | End: 2020-10-23 | Stop reason: HOSPADM

## 2020-10-22 RX ORDER — SODIUM CHLORIDE 0.9 % (FLUSH) 0.9 %
20 SYRINGE (ML) INJECTION PRN
Status: CANCELLED | OUTPATIENT
Start: 2020-10-22

## 2020-10-22 RX ORDER — SODIUM CHLORIDE 0.9 % (FLUSH) 0.9 %
10 SYRINGE (ML) INJECTION PRN
Status: CANCELLED | OUTPATIENT
Start: 2020-10-22

## 2020-10-22 RX ORDER — HEPARIN SODIUM (PORCINE) LOCK FLUSH IV SOLN 100 UNIT/ML 100 UNIT/ML
500 SOLUTION INTRAVENOUS PRN
Status: CANCELLED | OUTPATIENT
Start: 2020-10-22

## 2020-10-22 RX ORDER — HEPARIN SODIUM (PORCINE) LOCK FLUSH IV SOLN 100 UNIT/ML 100 UNIT/ML
500 SOLUTION INTRAVENOUS PRN
Status: DISCONTINUED | OUTPATIENT
Start: 2020-10-22 | End: 2020-10-23 | Stop reason: HOSPADM

## 2020-10-22 RX ADMIN — Medication 500 UNITS: at 13:51

## 2020-10-22 RX ADMIN — SODIUM CHLORIDE, PRESERVATIVE FREE 20 ML: 5 INJECTION INTRAVENOUS at 13:51

## 2020-10-22 NOTE — PROGRESS NOTES
Ambulated to infusion area. States feeling good. No complaints voiced. Chemo pump disconnected. Tolerated well.

## 2020-10-29 ENCOUNTER — HOSPITAL ENCOUNTER (OUTPATIENT)
Dept: INFUSION THERAPY | Age: 75
Discharge: HOME | End: 2020-10-29
Payer: MEDICARE

## 2020-10-29 ENCOUNTER — TELEPHONE (OUTPATIENT)
Dept: ONCOLOGY | Age: 75
End: 2020-10-29

## 2020-10-29 ENCOUNTER — OFFICE VISIT (OUTPATIENT)
Dept: ONCOLOGY | Age: 75
End: 2020-10-29
Payer: MEDICARE

## 2020-10-29 VITALS
TEMPERATURE: 98.5 F | OXYGEN SATURATION: 96 % | BODY MASS INDEX: 21.56 KG/M2 | SYSTOLIC BLOOD PRESSURE: 122 MMHG | WEIGHT: 129.4 LBS | RESPIRATION RATE: 16 BRPM | DIASTOLIC BLOOD PRESSURE: 69 MMHG | HEIGHT: 65 IN | HEART RATE: 90 BPM

## 2020-10-29 PROCEDURE — 3017F COLORECTAL CA SCREEN DOC REV: CPT | Performed by: NURSE PRACTITIONER

## 2020-10-29 PROCEDURE — 1123F ACP DISCUSS/DSCN MKR DOCD: CPT | Performed by: NURSE PRACTITIONER

## 2020-10-29 PROCEDURE — G8400 PT W/DXA NO RESULTS DOC: HCPCS | Performed by: NURSE PRACTITIONER

## 2020-10-29 PROCEDURE — G8420 CALC BMI NORM PARAMETERS: HCPCS | Performed by: NURSE PRACTITIONER

## 2020-10-29 PROCEDURE — 1090F PRES/ABSN URINE INCON ASSESS: CPT | Performed by: NURSE PRACTITIONER

## 2020-10-29 PROCEDURE — 4040F PNEUMOC VAC/ADMIN/RCVD: CPT | Performed by: NURSE PRACTITIONER

## 2020-10-29 PROCEDURE — G8427 DOCREV CUR MEDS BY ELIG CLIN: HCPCS | Performed by: NURSE PRACTITIONER

## 2020-10-29 PROCEDURE — G8484 FLU IMMUNIZE NO ADMIN: HCPCS | Performed by: NURSE PRACTITIONER

## 2020-10-29 PROCEDURE — 1036F TOBACCO NON-USER: CPT | Performed by: NURSE PRACTITIONER

## 2020-10-29 PROCEDURE — 99213 OFFICE O/P EST LOW 20 MIN: CPT | Performed by: NURSE PRACTITIONER

## 2020-10-29 PROCEDURE — 99211 OFF/OP EST MAY X REQ PHY/QHP: CPT

## 2020-10-29 NOTE — PROGRESS NOTES
MA Rooming Questions  Patient: Corina Paula  MRN: [id number]    Date: 10/29/2020        1. Do you have any new issues? yes - feels light headed sometimes         2. Do you need any refills on medications?    no    3. Have you had any imaging done since your last visit?   no    4. Have you been hospitalized or seen in the emergency room since your last visit here?   no    5. Did the patient have a depression screening completed today?  No    No data recorded     PHQ-9 Given to (if applicable):               PHQ-9 Score (if applicable):                     [] Positive     []  Negative              Does question #9 need addressed (if applicable)                     [] Yes    []  No               Joya Browne MA

## 2020-10-29 NOTE — PROGRESS NOTES
Patient Name: Annette Griffiths  Patient : 1945  Patient MRN: [id number]     Primary Oncologist: Michael Albert MD  Referring Provider: John Manzo MD     Date of Service: 10/29/2020      Chief Complaint:   Chief Complaint   Patient presents with    Follow-up     Patient Active Problem List:     Malignant neoplasm of sigmoid colon      Pleural effusion, right     Colon cancer metastasized to lung     Anemia     Drug induced neutropenia    HPI:   Ms. 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 She initially presented to [facility] on 2017, with nausea and vomiting. She had a CT angiogram of the abdomen on admission, 2017, and it showed irregular, asymmetric wall thickening involving a 4 cm segment of sigmoid colon with somewhat nodular infiltration of adjacent fat. Dilatation proximally which is most prominent involving the colon at the hepatic flexure where it measures up to 8.9 cm. Findings are concerning for obstructing sigmoid neoplasm. She also has a mildly enlarged lymph node anterior to the distal iliac vasculature. She was subsequently evaluated by Dr. Hannah Montoya and she underwent exploratory laparotomy, sigmoid colon resection with end colostomy, bladder dome resection and closure, segment of small bowel resection, on 2017. Final pathology revealed low-grade invasive adenocarcinoma. Chronic inflammation and fibrosis with focal abscess associated with invasive adenocarcinoma. Small segment of small intestine revealed mild chronic nonspecific mucosal enteritis. Tumor size was 4.3 x 4.5 cm in diameter and all the surgical margins were negative. Lymphovascular invasion and perineural invasion were present. Eight out of 11 lymph nodes examined were positive for metastasis (pT3 pN2b pMx). Her CEA level on October 8, 2017, was 6.5 ng/mL. She was discharged from the hospital with an appointment to see me as an outpatient. After a long discussion with Ms. Joni Segal, she has decided to have adjuvant chemotherapy with FOLFOX for her stage IIIC colon cancer. Adjuvant chemotherapy with FOLFOX was started on November 28, 2017 and we stopped it on April 24, 2018 (total 10 treatments), after she experienced syncope attack after last treatment. We decided to follow her closely. She underwent colonoscopy by Dr. Shivani Quan on January 24, 2018 and it was a normal study according to her. She has CT scan of the chest, abdomen and pelvis on October 15, 2018 and it showed postsurgical changes from partial colectomy with left lower quadrant colostomy. Bilateral 2-6 mm pulmonary nodules, new since 2014, one of which has slightly increased in size since 2017 in the left upper lobe. These are suspicious for metastatic disease. Grossly stable 1.2 cm hypoenhancing lesion in segment VII of liver which was less conspicuous on the prior exam. Given stability, findings favor benign process. No new findings of metastatic disease in the abdomen or pelvis. Cholelithiasis. CT scan of the chest done on 4/10/19 showed increased size and number bilateral pulmonary metastases. At least 4 liver lesions as above, at least 1 appearing unchanged since 07/28/2014. Metastatic disease, primary malignancy, and benign processes such as cysts and hemangioma should be considered. Recommend further evaluation with liver protocol abdomen MRI (using a nonhepatobiliary contrast agent) or CT. She underwent CT guided biopsy of the lung nodules on 4/24/19 and final pathology showed metastatic adenocarcinoma, consistent with a colorectal primary. KRAS, NRAS, BRAF study were negative.  MSI reveals stable disease. Since she is found to have metastatic colon cancer, we started first line chemotherapy with FOLFOX and panitumumab since 5/22/19. We stopped it on July 1, 2020 since she has progression of the disease. Second line chemotherapy with FOLFIRI and panitumumab was started on 7/15/20. We stopped it after 9/29/20 therapy since she is found to have progression of the disease. She couldn't handle well on FOLFIRI and panitumumab. CT scan on 10/5/20 showed overall significant progression of metastatic disease. Decision was made to rechallenge with FOLFOX along with avastin since she never receives avastin before. Will plan to start it on 10/13/20. On October 29, 2020, she presented to me for follow up. I have been following Ms. Zoila Gamez for stage IIIC low-grade invasive sigmoid colon adenocarcinoma and she is status post surgery followed by adjuvant chemotherapy with FOLFOX. CT scan on 4/10/19 showed increase in size and numbers of bilateral pulmonary nodules. CEA done the same day also showed increasing (52.9). Subsequently underwent CT guided biopsy of lung nodules on 4/24/19 and final pathology showed metastatic adenocarcinoma consistent with colorectal primary. All MELISSA study, BRAF were negative and MSI studies showed she has MSI stable disease. First line chemotherapy with FOLFOX and panitumumab was started on 5/22/19 and we stopped it on July 1, 2020 since she is found to have progression of the disease. Second chemotherapy with FOLFIRI and Panitumumab was started on July 15, 2020. We stopped it after 9/29/20 therapy since she is found to have progression of the disease. She couldn't handle well on FOLFIRI and panitumumab. CT scan on 10/5/20 showed overall significant progression of metastatic disease. Decision was made to rechallenge with FOLFOX along with avastin since she never receives avastin before. Will plan to start it on 10/13/20.     Presented on C1D10 (October 29, 2020) for scheduled follow up. Reports this chemotherapy treatment has been easier for her tolerate. She had dizziness for a few days after chemotherapy but this has resolved. I will continue to monitor her CEA closely and will consider to have repeat scans in 3 to 4 months. If she fails to respond to FOLFOX and avastin, will consider using either lonsurf or stivarga. She still has issue with diarrhea due to chemotherapy. I recommend her to continue with lomotil as needed basis. She reports improvement in this with use of Lomotil about twice a week. Besides that, she does not have any other significant symptoms at today's visit. Past Medical History  1. Hypertension. 2.    Hyperlipidemia. 3.    Aortic stenosis status post aortic valve replacement. 4.    Gastroesophageal reflux disease. Surgical History  1. Right breast lumpectomy in 1980.  2.    Coronary artery bypass grafting on July 30, 2014. 3.    Total abdominal hysterectomy in 1980s. 4.    Exploratory laparotomy, small bowel resection, colon resection, partial bladder resection and repair, creation of colostomy on October 7, 2017. Allergies  NKDA     Social History  She denies smoking, alcohol drinking, and illicit drug abuse. She is  and currently living in [facility]. Family History  No pertinent family history. Review of Systems: \"Per interval history; otherwise 10 point ROS is negative. \"  Her energy level is improved, appetite and sleep are better. No fever, chills, night sweats, cough, shortness of breath, chest pain, hemoptysis or palpitation. Occasional diarrhea, reports she thinks there is an association with anxiety, and bladder functions are normal. She denies  nausea, vomiting, abdominal pain, diarrhea, constipation, dysuria, loss of appetite or weight loss. Doesn't have neuropathy and she denies bleeding or clotting issues. No anxiety or depression.  She denies any pain on today's visit. The rest of the systems are unremarkable. Vital Signs:  /69 (Site: Left Upper Arm, Position: Sitting, Cuff Size: Medium Adult)   Pulse 90   Temp 98.5 °F (36.9 °C) (Infrared)   Resp 16   Ht 5' 5\" (1.651 m)   Wt 129 lb 6.4 oz (58.7 kg)   SpO2 96%   BMI 21.53 kg/m²     Physical Exam:  CONSTITUTIONAL: awake, alert, cooperative, no apparent distress   EYES:  sclera clear and conjunctiva normal  ENT: Normocephalic, without obvious abnormality, atraumatic  NECK: supple, symmetrical  HEMATOLOGIC/LYMPHATIC: no cervical, supraclavicular or axillary lymphadenopathy   LUNGS:  no increased work of breathing and clear to auscultation   CARDIOVASCULAR: regular rate and rhythm, normal S1 and S2, no murmur noted  ABDOMEN:  soft, non-distended, normal bowel sounds x 4, non-tender, no masses palpated, no hepatosplenomegaly   MUSCULOSKELETAL: full range of motion noted, tone is normal  NEUROLOGIC: awake, alert, oriented to name, place and time. Motor skills grossly intact. SKIN: Normal skin color, texture, turgor and no jaundice.  appears intact   EXTREMITIES: no LE edema, no clubbing, no leg swelling, no cyanosis     Labs:  Hematology:  Lab Results   Component Value Date    WBC 3.8 (L) 10/19/2020    RBC 3.22 (L) 10/19/2020    HGB 9.2 (L) 10/19/2020    HCT 29.3 (L) 10/19/2020    MCV 91.0 10/19/2020    MCH 28.6 10/19/2020    MCHC 31.4 (L) 10/19/2020    RDW 17.2 (H) 10/19/2020     10/19/2020    MPV 10.4 10/19/2020    BANDSPCT 13 (H) 10/08/2017    SEGSPCT 44.3 10/19/2020    EOSRELPCT 2.1 10/19/2020    BASOPCT 0.5 10/19/2020    LYMPHOPCT 28.4 10/19/2020    MONOPCT 24.7 (H) 10/19/2020    BANDABS 0.56 10/08/2017    SEGSABS 1.7 10/19/2020    EOSABS 0.1 10/19/2020    BASOSABS 0.0 10/19/2020    LYMPHSABS 1.1 10/19/2020    MONOSABS 0.9 10/19/2020    DIFFTYPE AUTOMATED DIFFERENTIAL 10/19/2020     No results found for: ESR  Chemistry:  Lab Results   Component Value Date     10/19/2020    K 3.8 10/19/2020     10/19/2020    CO2 26 10/19/2020    BUN 9 10/19/2020    CREATININE 0.9 10/19/2020    GLUCOSE 134 (H) 10/19/2020    CALCIUM 9.2 10/19/2020    PROT 6.4 10/19/2020    LABALBU 3.6 10/19/2020    BILITOT 0.4 10/19/2020    ALKPHOS 353 (H) 10/19/2020    AST 43 (H) 10/19/2020    ALT 20 10/19/2020    LABGLOM >60 10/19/2020    GFRAA >60 10/19/2020    PHOS 4.7 11/12/2019    MG 2.4 01/06/2020    POCCA 1.17 10/19/2020    POCGLU 139 (H) 10/19/2020     No results found for: MMA, LDH, HOMOCYSTEINE  No components found for: LD  No results found for: TSHHS, T4FREE, FT3  Immunology:  Lab Results   Component Value Date    PROT 6.4 10/19/2020     No results found for: [place]avez Half, KLFLCR  No results found for: B2M  Coagulation Panel:  Lab Results   Component Value Date    PROTIME 11.6 (L) 02/24/2020    INR 1.02 02/24/2020    APTT 19.3 (L) 02/24/2020     Anemia Panel:  Lab Results   Component Value Date    XRLMQZJL68 306.6 02/03/2020    FOLATE >20.0 (H) 02/03/2020     Tumor Markers:  Lab Results   Component Value Date    CEA 50.5 10/12/2020     Observations:  No data recorded      Assessment & Plan:   Stage IIIC low-grade invasive sigmoid colon adenocarcinoma. PLAN:  Overall Ms. Mike York is feeling quite well and does not have any significant new symptoms on today's visit. I have been following Ms. Mike York for stage IIIC low-grade invasive sigmoid colon adenocarcinoma and she is status post surgery followed by adjuvant chemotherapy with FOLFOX. CT scan on 4/10/19 showed increase in size and numbers of bilateral pulmonary nodules. CEA done the same day also showed increasing (52.9). Subsequently underwent CT guided biopsy of lung nodules on 4/24/19 and final pathology showed metastatic adenocarcinoma consistent with colorectal primary. All MELISSA study, BRAF were negative and MSI studies showed she has MSI stable disease.  First line chemotherapy with FOLFOX and panitumumab was started on 5/22/19 and we stopped it on July 1, 2020 since she is found to have progression of the disease. Second chemotherapy with FOLFIRI and Panitumumab was started on July 15, 2020. We stopped it after 9/29/20 therapy since she is found to have progression of the disease. She couldn't handle well on FOLFIRI and panitumumab. CT scan on 10/5/20 showed overall significant progression of metastatic disease. Decision was made to rechallenge with FOLFOX along with avastin since she never receives avastin before. Will plan to start it on 10/13/20. I will continue to monitor her CEA closely and will consider to have repeat scans in 3 to 4 months. If she fails to respond to FOLFOX and avastin, will consider using either lonsurf or stivarga. Presented on C1D10 (October 29, 2020) for scheduled follow up. Reports this chemotherapy treatment has been easier for her tolerate. She had dizziness for a few days after chemotherapy but this has resolved. She still has issue with diarrhea due to chemotherapy. I recommend her to continue with lomotil as needed basis. She reports improvement in this with use of Lomotil about twice a week. I have reviewed all these plans with Ms. Hood Amin and she is in agreement with the plans. I answered all her questions and concerns for today. I asked her to follow up with her  primary care physician on a regular basis. I will continue to keep you updated on her progress. Thank you for allowing me to participate in the care of this very pleasant patient. Recent imaging and labs were reviewed and discussed with the patient.

## 2020-11-02 ENCOUNTER — HOSPITAL ENCOUNTER (OUTPATIENT)
Dept: INFUSION THERAPY | Age: 75
Discharge: HOME | End: 2020-11-02
Payer: MEDICARE

## 2020-11-02 DIAGNOSIS — C78.00 COLON CANCER METASTASIZED TO LUNG (HCC): ICD-10-CM

## 2020-11-02 DIAGNOSIS — C18.9 COLON CANCER METASTASIZED TO LUNG (HCC): ICD-10-CM

## 2020-11-02 DIAGNOSIS — C18.7 MALIGNANT NEOPLASM OF SIGMOID COLON (HCC): ICD-10-CM

## 2020-11-02 LAB
ALBUMIN SERPL-MCNC: 3.5 GM/DL (ref 3.4–5)
ALP BLD-CCNC: 414 IU/L (ref 40–128)
ALT SERPL-CCNC: 19 U/L (ref 10–40)
ANION GAP SERPL CALCULATED.3IONS-SCNC: 10 MMOL/L (ref 4–16)
AST SERPL-CCNC: 41 IU/L (ref 15–37)
BASOPHILS ABSOLUTE: 0 K/CU MM
BASOPHILS RELATIVE PERCENT: 0.5 % (ref 0–1)
BILIRUB SERPL-MCNC: 0.4 MG/DL (ref 0–1)
BUN BLDV-MCNC: 15 MG/DL (ref 6–23)
CALCIUM SERPL-MCNC: 9.5 MG/DL (ref 8.3–10.6)
CEA: 146.8 NG/ML
CHLORIDE BLD-SCNC: 105 MMOL/L (ref 99–110)
CO2: 27 MMOL/L (ref 21–32)
CREAT SERPL-MCNC: 0.8 MG/DL (ref 0.6–1.1)
DIFFERENTIAL TYPE: ABNORMAL
EOSINOPHILS ABSOLUTE: 0.1 K/CU MM
EOSINOPHILS RELATIVE PERCENT: 1.7 % (ref 0–3)
GFR AFRICAN AMERICAN: >60 ML/MIN/1.73M2
GFR AFRICAN AMERICAN: >60 ML/MIN/1.73M2
GFR NON-AFRICAN AMERICAN: >60 ML/MIN/1.73M2
GFR NON-AFRICAN AMERICAN: >60 ML/MIN/1.73M2
GLUCOSE BLD-MCNC: 156 MG/DL (ref 70–99)
GLUCOSE BLD-MCNC: 175 MG/DL (ref 70–99)
HCT VFR BLD CALC: 28.7 % (ref 37–47)
HEMOGLOBIN: 9.1 GM/DL (ref 12.5–16)
LYMPHOCYTES ABSOLUTE: 0.7 K/CU MM
LYMPHOCYTES RELATIVE PERCENT: 16.3 % (ref 24–44)
MCH RBC QN AUTO: 28.1 PG (ref 27–31)
MCHC RBC AUTO-ENTMCNC: 31.7 % (ref 32–36)
MCV RBC AUTO: 88.6 FL (ref 78–100)
MONOCYTES ABSOLUTE: 0.7 K/CU MM
MONOCYTES RELATIVE PERCENT: 17.5 % (ref 0–4)
PDW BLD-RTO: 16.1 % (ref 11.7–14.9)
PLATELET # BLD: 182 K/CU MM (ref 140–440)
PMV BLD AUTO: 10.9 FL (ref 7.5–11.1)
POC CALCIUM: 1.27 MMOL/L (ref 1.12–1.32)
POC CHLORIDE: 109 MMOL/L (ref 98–109)
POC CREATININE: 0.7 MG/DL (ref 0.6–1.1)
POTASSIUM SERPL-SCNC: 3.9 MMOL/L (ref 3.6–5.1)
POTASSIUM SERPL-SCNC: 4.1 MMOL/L (ref 3.5–5.1)
PROTEIN UA: 100 MG/DL
RBC # BLD: 3.24 M/CU MM (ref 4.2–5.4)
SEGMENTED NEUTROPHILS ABSOLUTE COUNT: 2.7 K/CU MM
SEGMENTED NEUTROPHILS RELATIVE PERCENT: 64 % (ref 36–66)
SODIUM BLD-SCNC: 142 MMOL/L (ref 135–145)
SODIUM BLD-SCNC: 146 MMOL/L (ref 136–145)
SOURCE, BLOOD GAS: ABNORMAL
TOTAL PROTEIN: 6.4 GM/DL (ref 6.4–8.2)
WBC # BLD: 4.2 K/CU MM (ref 4–10.5)

## 2020-11-02 PROCEDURE — 82378 CARCINOEMBRYONIC ANTIGEN: CPT

## 2020-11-02 PROCEDURE — 36415 COLL VENOUS BLD VENIPUNCTURE: CPT

## 2020-11-02 PROCEDURE — 81003 URINALYSIS AUTO W/O SCOPE: CPT

## 2020-11-02 PROCEDURE — 80053 COMPREHEN METABOLIC PANEL: CPT

## 2020-11-02 PROCEDURE — 85025 COMPLETE CBC W/AUTO DIFF WBC: CPT

## 2020-11-03 ENCOUNTER — HOSPITAL ENCOUNTER (OUTPATIENT)
Dept: INFUSION THERAPY | Age: 75
Discharge: HOME | End: 2020-11-03
Payer: MEDICARE

## 2020-11-03 VITALS
TEMPERATURE: 98.6 F | WEIGHT: 130.4 LBS | OXYGEN SATURATION: 97 % | DIASTOLIC BLOOD PRESSURE: 69 MMHG | HEIGHT: 65 IN | SYSTOLIC BLOOD PRESSURE: 143 MMHG | BODY MASS INDEX: 21.73 KG/M2 | HEART RATE: 82 BPM | RESPIRATION RATE: 18 BRPM

## 2020-11-03 DIAGNOSIS — C18.7 MALIGNANT NEOPLASM OF SIGMOID COLON (HCC): Primary | ICD-10-CM

## 2020-11-03 PROCEDURE — 96417 CHEMO IV INFUS EACH ADDL SEQ: CPT

## 2020-11-03 PROCEDURE — 96375 TX/PRO/DX INJ NEW DRUG ADDON: CPT

## 2020-11-03 PROCEDURE — 6360000002 HC RX W HCPCS: Performed by: INTERNAL MEDICINE

## 2020-11-03 PROCEDURE — 96413 CHEMO IV INFUSION 1 HR: CPT

## 2020-11-03 PROCEDURE — 96368 THER/DIAG CONCURRENT INF: CPT

## 2020-11-03 PROCEDURE — 2580000003 HC RX 258: Performed by: INTERNAL MEDICINE

## 2020-11-03 PROCEDURE — 96415 CHEMO IV INFUSION ADDL HR: CPT

## 2020-11-03 PROCEDURE — 96411 CHEMO IV PUSH ADDL DRUG: CPT

## 2020-11-03 RX ORDER — SODIUM CHLORIDE 0.9 % (FLUSH) 0.9 %
5 SYRINGE (ML) INJECTION PRN
Status: CANCELLED | OUTPATIENT
Start: 2020-12-15

## 2020-11-03 RX ORDER — MEPERIDINE HYDROCHLORIDE 50 MG/ML
12.5 INJECTION INTRAMUSCULAR; INTRAVENOUS; SUBCUTANEOUS ONCE
Status: CANCELLED | OUTPATIENT
Start: 2020-11-17

## 2020-11-03 RX ORDER — SODIUM CHLORIDE 0.9 % (FLUSH) 0.9 %
5 SYRINGE (ML) INJECTION PRN
Status: CANCELLED | OUTPATIENT
Start: 2020-12-29

## 2020-11-03 RX ORDER — SODIUM CHLORIDE 0.9 % (FLUSH) 0.9 %
10 SYRINGE (ML) INJECTION PRN
Status: DISCONTINUED | OUTPATIENT
Start: 2020-11-03 | End: 2020-11-04 | Stop reason: HOSPADM

## 2020-11-03 RX ORDER — MEPERIDINE HYDROCHLORIDE 50 MG/ML
12.5 INJECTION INTRAMUSCULAR; INTRAVENOUS; SUBCUTANEOUS ONCE
Status: CANCELLED | OUTPATIENT
Start: 2020-12-15

## 2020-11-03 RX ORDER — METHYLPREDNISOLONE SODIUM SUCCINATE 125 MG/2ML
125 INJECTION, POWDER, LYOPHILIZED, FOR SOLUTION INTRAMUSCULAR; INTRAVENOUS ONCE
Status: CANCELLED | OUTPATIENT
Start: 2020-11-03

## 2020-11-03 RX ORDER — EPINEPHRINE 1 MG/ML
0.3 INJECTION, SOLUTION, CONCENTRATE INTRAVENOUS PRN
Status: CANCELLED | OUTPATIENT
Start: 2020-12-15

## 2020-11-03 RX ORDER — SODIUM CHLORIDE 9 MG/ML
INJECTION, SOLUTION INTRAVENOUS CONTINUOUS
Status: CANCELLED | OUTPATIENT
Start: 2020-11-03

## 2020-11-03 RX ORDER — SODIUM CHLORIDE 9 MG/ML
INJECTION, SOLUTION INTRAVENOUS ONCE
Status: CANCELLED | OUTPATIENT
Start: 2020-12-01

## 2020-11-03 RX ORDER — DEXTROSE MONOHYDRATE 50 MG/ML
INJECTION, SOLUTION INTRAVENOUS ONCE
Status: CANCELLED | OUTPATIENT
Start: 2020-12-15

## 2020-11-03 RX ORDER — DEXTROSE MONOHYDRATE 50 MG/ML
INJECTION, SOLUTION INTRAVENOUS ONCE
Status: CANCELLED | OUTPATIENT
Start: 2020-12-01

## 2020-11-03 RX ORDER — DIPHENHYDRAMINE HYDROCHLORIDE 50 MG/ML
50 INJECTION INTRAMUSCULAR; INTRAVENOUS ONCE
Status: CANCELLED | OUTPATIENT
Start: 2020-12-29

## 2020-11-03 RX ORDER — MEPERIDINE HYDROCHLORIDE 50 MG/ML
12.5 INJECTION INTRAMUSCULAR; INTRAVENOUS; SUBCUTANEOUS ONCE
Status: CANCELLED | OUTPATIENT
Start: 2020-11-03

## 2020-11-03 RX ORDER — HEPARIN SODIUM (PORCINE) LOCK FLUSH IV SOLN 100 UNIT/ML 100 UNIT/ML
500 SOLUTION INTRAVENOUS PRN
Status: CANCELLED | OUTPATIENT
Start: 2020-11-17

## 2020-11-03 RX ORDER — SODIUM CHLORIDE 0.9 % (FLUSH) 0.9 %
5 SYRINGE (ML) INJECTION PRN
Status: CANCELLED | OUTPATIENT
Start: 2020-12-01

## 2020-11-03 RX ORDER — SODIUM CHLORIDE 9 MG/ML
INJECTION, SOLUTION INTRAVENOUS ONCE
Status: CANCELLED | OUTPATIENT
Start: 2020-12-15

## 2020-11-03 RX ORDER — SODIUM CHLORIDE 0.9 % (FLUSH) 0.9 %
10 SYRINGE (ML) INJECTION PRN
Status: CANCELLED | OUTPATIENT
Start: 2020-12-29

## 2020-11-03 RX ORDER — PALONOSETRON 0.05 MG/ML
0.25 INJECTION, SOLUTION INTRAVENOUS ONCE
Status: CANCELLED | OUTPATIENT
Start: 2020-11-17

## 2020-11-03 RX ORDER — DEXTROSE MONOHYDRATE 50 MG/ML
INJECTION, SOLUTION INTRAVENOUS ONCE
Status: CANCELLED | OUTPATIENT
Start: 2020-11-03

## 2020-11-03 RX ORDER — DEXTROSE MONOHYDRATE 50 MG/ML
INJECTION, SOLUTION INTRAVENOUS ONCE
Status: CANCELLED | OUTPATIENT
Start: 2020-11-17

## 2020-11-03 RX ORDER — SODIUM CHLORIDE 9 MG/ML
INJECTION, SOLUTION INTRAVENOUS ONCE
Status: CANCELLED | OUTPATIENT
Start: 2020-11-17

## 2020-11-03 RX ORDER — PALONOSETRON 0.05 MG/ML
0.25 INJECTION, SOLUTION INTRAVENOUS ONCE
Status: CANCELLED | OUTPATIENT
Start: 2020-12-29

## 2020-11-03 RX ORDER — METHYLPREDNISOLONE SODIUM SUCCINATE 125 MG/2ML
125 INJECTION, POWDER, LYOPHILIZED, FOR SOLUTION INTRAMUSCULAR; INTRAVENOUS ONCE
Status: CANCELLED | OUTPATIENT
Start: 2020-12-29

## 2020-11-03 RX ORDER — EPINEPHRINE 1 MG/ML
0.3 INJECTION, SOLUTION, CONCENTRATE INTRAVENOUS PRN
Status: CANCELLED | OUTPATIENT
Start: 2020-11-17

## 2020-11-03 RX ORDER — DIPHENHYDRAMINE HYDROCHLORIDE 50 MG/ML
50 INJECTION INTRAMUSCULAR; INTRAVENOUS ONCE
Status: CANCELLED | OUTPATIENT
Start: 2020-12-01

## 2020-11-03 RX ORDER — SODIUM CHLORIDE 9 MG/ML
INJECTION, SOLUTION INTRAVENOUS ONCE
Status: CANCELLED | OUTPATIENT
Start: 2020-11-03

## 2020-11-03 RX ORDER — SODIUM CHLORIDE 9 MG/ML
INJECTION, SOLUTION INTRAVENOUS ONCE
Status: DISCONTINUED | OUTPATIENT
Start: 2020-11-03 | End: 2020-11-04 | Stop reason: HOSPADM

## 2020-11-03 RX ORDER — SODIUM CHLORIDE 9 MG/ML
INJECTION, SOLUTION INTRAVENOUS CONTINUOUS
Status: CANCELLED | OUTPATIENT
Start: 2020-11-17

## 2020-11-03 RX ORDER — EPINEPHRINE 1 MG/ML
0.3 INJECTION, SOLUTION, CONCENTRATE INTRAVENOUS PRN
Status: CANCELLED | OUTPATIENT
Start: 2020-12-29

## 2020-11-03 RX ORDER — SODIUM CHLORIDE 0.9 % (FLUSH) 0.9 %
10 SYRINGE (ML) INJECTION PRN
Status: CANCELLED | OUTPATIENT
Start: 2020-11-03

## 2020-11-03 RX ORDER — EPINEPHRINE 1 MG/ML
0.3 INJECTION, SOLUTION, CONCENTRATE INTRAVENOUS PRN
Status: CANCELLED | OUTPATIENT
Start: 2020-11-03

## 2020-11-03 RX ORDER — METHYLPREDNISOLONE SODIUM SUCCINATE 125 MG/2ML
125 INJECTION, POWDER, LYOPHILIZED, FOR SOLUTION INTRAMUSCULAR; INTRAVENOUS ONCE
Status: CANCELLED | OUTPATIENT
Start: 2020-12-01

## 2020-11-03 RX ORDER — HEPARIN SODIUM (PORCINE) LOCK FLUSH IV SOLN 100 UNIT/ML 100 UNIT/ML
500 SOLUTION INTRAVENOUS PRN
Status: CANCELLED | OUTPATIENT
Start: 2020-12-01

## 2020-11-03 RX ORDER — SODIUM CHLORIDE 9 MG/ML
INJECTION, SOLUTION INTRAVENOUS ONCE
Status: CANCELLED | OUTPATIENT
Start: 2020-12-29

## 2020-11-03 RX ORDER — HEPARIN SODIUM (PORCINE) LOCK FLUSH IV SOLN 100 UNIT/ML 100 UNIT/ML
500 SOLUTION INTRAVENOUS PRN
Status: CANCELLED | OUTPATIENT
Start: 2020-12-29

## 2020-11-03 RX ORDER — DEXTROSE MONOHYDRATE 50 MG/ML
INJECTION, SOLUTION INTRAVENOUS ONCE
Status: CANCELLED | OUTPATIENT
Start: 2020-12-29

## 2020-11-03 RX ORDER — SODIUM CHLORIDE 0.9 % (FLUSH) 0.9 %
5 SYRINGE (ML) INJECTION PRN
Status: CANCELLED | OUTPATIENT
Start: 2020-11-17

## 2020-11-03 RX ORDER — MEPERIDINE HYDROCHLORIDE 50 MG/ML
12.5 INJECTION INTRAMUSCULAR; INTRAVENOUS; SUBCUTANEOUS ONCE
Status: CANCELLED | OUTPATIENT
Start: 2020-12-01

## 2020-11-03 RX ORDER — PALONOSETRON 0.05 MG/ML
0.25 INJECTION, SOLUTION INTRAVENOUS ONCE
Status: COMPLETED | OUTPATIENT
Start: 2020-11-03 | End: 2020-11-03

## 2020-11-03 RX ORDER — HEPARIN SODIUM (PORCINE) LOCK FLUSH IV SOLN 100 UNIT/ML 100 UNIT/ML
500 SOLUTION INTRAVENOUS PRN
Status: CANCELLED | OUTPATIENT
Start: 2020-12-15

## 2020-11-03 RX ORDER — METHYLPREDNISOLONE SODIUM SUCCINATE 125 MG/2ML
125 INJECTION, POWDER, LYOPHILIZED, FOR SOLUTION INTRAMUSCULAR; INTRAVENOUS ONCE
Status: CANCELLED | OUTPATIENT
Start: 2020-12-15

## 2020-11-03 RX ORDER — SODIUM CHLORIDE 9 MG/ML
INJECTION, SOLUTION INTRAVENOUS CONTINUOUS
Status: CANCELLED | OUTPATIENT
Start: 2020-12-15

## 2020-11-03 RX ORDER — METHYLPREDNISOLONE SODIUM SUCCINATE 125 MG/2ML
125 INJECTION, POWDER, LYOPHILIZED, FOR SOLUTION INTRAMUSCULAR; INTRAVENOUS ONCE
Status: CANCELLED | OUTPATIENT
Start: 2020-11-17

## 2020-11-03 RX ORDER — SODIUM CHLORIDE 9 MG/ML
INJECTION, SOLUTION INTRAVENOUS CONTINUOUS
Status: CANCELLED | OUTPATIENT
Start: 2020-12-29

## 2020-11-03 RX ORDER — EPINEPHRINE 1 MG/ML
0.3 INJECTION, SOLUTION, CONCENTRATE INTRAVENOUS PRN
Status: CANCELLED | OUTPATIENT
Start: 2020-12-01

## 2020-11-03 RX ORDER — DEXTROSE MONOHYDRATE 50 MG/ML
INJECTION, SOLUTION INTRAVENOUS ONCE
Status: DISCONTINUED | OUTPATIENT
Start: 2020-11-03 | End: 2020-11-04 | Stop reason: HOSPADM

## 2020-11-03 RX ORDER — SODIUM CHLORIDE 0.9 % (FLUSH) 0.9 %
10 SYRINGE (ML) INJECTION PRN
Status: CANCELLED | OUTPATIENT
Start: 2020-12-15

## 2020-11-03 RX ORDER — MEPERIDINE HYDROCHLORIDE 50 MG/ML
12.5 INJECTION INTRAMUSCULAR; INTRAVENOUS; SUBCUTANEOUS ONCE
Status: CANCELLED | OUTPATIENT
Start: 2020-12-29

## 2020-11-03 RX ORDER — SODIUM CHLORIDE 0.9 % (FLUSH) 0.9 %
5 SYRINGE (ML) INJECTION PRN
Status: CANCELLED | OUTPATIENT
Start: 2020-11-03

## 2020-11-03 RX ORDER — HEPARIN SODIUM (PORCINE) LOCK FLUSH IV SOLN 100 UNIT/ML 100 UNIT/ML
500 SOLUTION INTRAVENOUS PRN
Status: CANCELLED | OUTPATIENT
Start: 2020-11-03

## 2020-11-03 RX ORDER — SODIUM CHLORIDE 9 MG/ML
INJECTION, SOLUTION INTRAVENOUS CONTINUOUS
Status: CANCELLED | OUTPATIENT
Start: 2020-12-01

## 2020-11-03 RX ORDER — PALONOSETRON 0.05 MG/ML
0.25 INJECTION, SOLUTION INTRAVENOUS ONCE
Status: CANCELLED | OUTPATIENT
Start: 2020-11-03

## 2020-11-03 RX ORDER — DIPHENHYDRAMINE HYDROCHLORIDE 50 MG/ML
50 INJECTION INTRAMUSCULAR; INTRAVENOUS ONCE
Status: CANCELLED | OUTPATIENT
Start: 2020-12-15

## 2020-11-03 RX ORDER — DIPHENHYDRAMINE HYDROCHLORIDE 50 MG/ML
50 INJECTION INTRAMUSCULAR; INTRAVENOUS ONCE
Status: CANCELLED | OUTPATIENT
Start: 2020-11-17

## 2020-11-03 RX ORDER — SODIUM CHLORIDE 0.9 % (FLUSH) 0.9 %
10 SYRINGE (ML) INJECTION PRN
Status: CANCELLED | OUTPATIENT
Start: 2020-12-01

## 2020-11-03 RX ORDER — SODIUM CHLORIDE 0.9 % (FLUSH) 0.9 %
10 SYRINGE (ML) INJECTION PRN
Status: CANCELLED | OUTPATIENT
Start: 2020-11-17

## 2020-11-03 RX ORDER — PALONOSETRON 0.05 MG/ML
0.25 INJECTION, SOLUTION INTRAVENOUS ONCE
Status: CANCELLED | OUTPATIENT
Start: 2020-12-15

## 2020-11-03 RX ORDER — PALONOSETRON 0.05 MG/ML
0.25 INJECTION, SOLUTION INTRAVENOUS ONCE
Status: CANCELLED | OUTPATIENT
Start: 2020-12-01

## 2020-11-03 RX ORDER — DIPHENHYDRAMINE HYDROCHLORIDE 50 MG/ML
50 INJECTION INTRAMUSCULAR; INTRAVENOUS ONCE
Status: CANCELLED | OUTPATIENT
Start: 2020-11-03

## 2020-11-03 RX ADMIN — DEXTROSE: 5 SOLUTION INTRAVENOUS at 10:33

## 2020-11-03 RX ADMIN — SODIUM CHLORIDE 577.5 MG: 9 INJECTION, SOLUTION INTRAVENOUS at 09:12

## 2020-11-03 RX ADMIN — FLUOROURACIL 650 MG: 50 INJECTION, SOLUTION INTRAVENOUS at 12:58

## 2020-11-03 RX ADMIN — DEXTROSE MONOHYDRATE 200 MG: 50 INJECTION, SOLUTION INTRAVENOUS at 10:33

## 2020-11-03 RX ADMIN — PALONOSETRON 0.25 MG: 0.25 INJECTION, SOLUTION INTRAVENOUS at 08:40

## 2020-11-03 RX ADMIN — OXALIPLATIN 138.5 MG: 5 INJECTION, SOLUTION INTRAVENOUS at 10:33

## 2020-11-03 RX ADMIN — SODIUM CHLORIDE: 9 INJECTION, SOLUTION INTRAVENOUS at 08:40

## 2020-11-03 RX ADMIN — DEXAMETHASONE SODIUM PHOSPHATE 12 MG: 4 INJECTION, SOLUTION INTRAMUSCULAR; INTRAVENOUS at 08:40

## 2020-11-03 NOTE — PROGRESS NOTES
0830: pt here for treatment, A&OX3. No new issues to report. She reports baseline neuropathy in her feet and fingers, has a llq colostomy bag, reports self care and maintenance of site. No redness, skin irritation reported at site. Pt has a right chest wall port, accessed with a 20 gauge 3/4 inch Gilbert needle, + blood return. biopatch to site and dressing to site. No labs today, pt had them yesterday and WNL results. Treatment:      0840: Dexamethasone infusion started post Aloxi IVP medication. 7548: Dexamethasone completed without issues    0912: Avastin (Zirabev) infusion given over 1 hour today. This is the pts 2nd dose. No new orders based on urine protein results obtained yesterday    1005: pt with no issues to report. A&Ox3.     1025: Avastin completed, urine protein from yesterday 100= +2, no new orders given for tx today. 1033-  Oxaliplatin and Leucovorin hung concurrently for infusion thru port. Pt in no distress,  A&OX3. Pt brought a tablet for her to occupy time during her infusion today  . 1258- 1313: 5FU infusion given without issues. + blood return pre/post infusion    1321: 5FU pump hooked up, rate 5ml/hr, Amerimed pump settings double checked by 2nd RN, rate 5ml/hr    Pt in no distress, AVS report provided and pt aware of her return appt dates. Patient's status assessed and documented appropriately. All labs and required results were also reviewed today. Treatment parameters have been reviewed. Today's treatment has been approved by the provider. Treatment orders and medication sequencing (when applicable) was verified by 2 registered nurses. The treatment plan was confirmed with the patient prior to administration, and the patient understands the need to report any treatment-related symptoms.     Prior to administration, when applicable, the following 8 elements of medication administration were reviewed with 2nd Registered Nurse prior to dosing: drug name, drug dose, infusion volume when prepared in a syringe, rate of administration, expiration dates and/or times, appearance and integrity of drug(s), and rate of pump for infusion. The 5 rights of medication administration have been verified.

## 2020-11-05 ENCOUNTER — HOSPITAL ENCOUNTER (OUTPATIENT)
Dept: INFUSION THERAPY | Age: 75
Discharge: HOME | End: 2020-11-05
Payer: MEDICARE

## 2020-11-05 DIAGNOSIS — C18.7 MALIGNANT NEOPLASM OF SIGMOID COLON (HCC): Primary | ICD-10-CM

## 2020-11-05 PROCEDURE — 96523 IRRIG DRUG DELIVERY DEVICE: CPT

## 2020-11-05 PROCEDURE — 6360000002 HC RX W HCPCS: Performed by: INTERNAL MEDICINE

## 2020-11-05 RX ORDER — SODIUM CHLORIDE 0.9 % (FLUSH) 0.9 %
10 SYRINGE (ML) INJECTION PRN
Status: CANCELLED | OUTPATIENT
Start: 2020-11-05

## 2020-11-05 RX ORDER — SODIUM CHLORIDE 0.9 % (FLUSH) 0.9 %
20 SYRINGE (ML) INJECTION PRN
Status: CANCELLED | OUTPATIENT
Start: 2020-11-05

## 2020-11-05 RX ORDER — HEPARIN SODIUM (PORCINE) LOCK FLUSH IV SOLN 100 UNIT/ML 100 UNIT/ML
500 SOLUTION INTRAVENOUS PRN
Status: CANCELLED | OUTPATIENT
Start: 2020-11-05

## 2020-11-05 RX ORDER — HEPARIN SODIUM (PORCINE) LOCK FLUSH IV SOLN 100 UNIT/ML 100 UNIT/ML
500 SOLUTION INTRAVENOUS PRN
Status: DISCONTINUED | OUTPATIENT
Start: 2020-11-05 | End: 2020-11-06 | Stop reason: HOSPADM

## 2020-11-05 RX ADMIN — Medication 500 UNITS: at 12:27

## 2020-11-05 NOTE — PROGRESS NOTES
Ambulated to infusion area. States feeling good. No complaints voiced. Chemo CIV pump disconnected. Tolerated well.

## 2020-11-09 ENCOUNTER — APPOINTMENT (OUTPATIENT)
Dept: INFUSION THERAPY | Age: 75
End: 2020-11-09
Payer: MEDICARE

## 2020-11-09 NOTE — PROGRESS NOTES
Patient Name: Marian Mensah  Patient : 1945  Patient MRN: [id number]     Primary Oncologist: Sarah Dutton MD  Referring Provider: Carlos Davison MD     Date of Service: 11/10/2020      Chief Complaint:   Chief Complaint   Patient presents with    Follow-up     Patient Active Problem List:     Malignant neoplasm of sigmoid colon      Pleural effusion, right     Colon cancer metastasized to lung     Anemia     Drug induced neutropenia    HPI:   Ms. 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 She initially presented to [facility] on 2017, with nausea and vomiting. She had a CT angiogram of the abdomen on admission, 2017, and it showed irregular, asymmetric wall thickening involving a 4 cm segment of sigmoid colon with somewhat nodular infiltration of adjacent fat. Dilatation proximally which is most prominent involving the colon at the hepatic flexure where it measures up to 8.9 cm. Findings are concerning for obstructing sigmoid neoplasm. She also has a mildly enlarged lymph node anterior to the distal iliac vasculature. She was subsequently evaluated by Dr. Peggy Moseley and she underwent exploratory laparotomy, sigmoid colon resection with end colostomy, bladder dome resection and closure, segment of small bowel resection, on 2017. Final pathology revealed low-grade invasive adenocarcinoma. Chronic inflammation and fibrosis with focal abscess associated with invasive adenocarcinoma. Small segment of small intestine revealed mild chronic nonspecific mucosal enteritis. Tumor size was 4.3 x 4.5 cm in diameter and all the surgical margins were negative. Lymphovascular invasion and perineural invasion were present. Eight out of 11 lymph nodes examined were positive for metastasis (pT3 pN2b pMx). Her CEA level on October 8, 2017, was 6.5 ng/mL. She was discharged from the hospital with an appointment to see me as an outpatient. After a long discussion with Ms. Santo, she has decided to have adjuvant chemotherapy with FOLFOX for her stage IIIC colon cancer. Adjuvant chemotherapy with FOLFOX was started on November 28, 2017 and we stopped it on April 24, 2018 (total 10 treatments), after she experienced syncope attack after last treatment. We decided to follow her closely. She underwent colonoscopy by Dr. Sai Brooks on January 24, 2018 and it was a normal study according to her. She has CT scan of the chest, abdomen and pelvis on October 15, 2018 and it showed postsurgical changes from partial colectomy with left lower quadrant colostomy. Bilateral 2-6 mm pulmonary nodules, new since 2014, one of which has slightly increased in size since 2017 in the left upper lobe. These are suspicious for metastatic disease. Grossly stable 1.2 cm hypoenhancing lesion in segment VII of liver which was less conspicuous on the prior exam. Given stability, findings favor benign process. No new findings of metastatic disease in the abdomen or pelvis. Cholelithiasis. CT scan of the chest done on 4/10/19 showed increased size and number bilateral pulmonary metastases. At least 4 liver lesions as above, at least 1 appearing unchanged since 07/28/2014. Metastatic disease, primary malignancy, and benign processes such as cysts and hemangioma should be considered. Recommend further evaluation with liver protocol abdomen MRI (using a nonhepatobiliary contrast agent) or CT. She underwent CT guided biopsy of the lung nodules on 4/24/19 and final pathology showed metastatic adenocarcinoma, consistent with a colorectal primary. KRAS, NRAS, BRAF study were negative.  MSI reveals stable disease. Since she is found to have metastatic colon cancer, we started first line chemotherapy with FOLFOX and panitumumab since 5/22/19. We stopped it on July 1, 2020 since she has progression of the disease. Second line chemotherapy with FOLFIRI and panitumumab was started on 7/15/20. We stopped it after 9/29/20 therapy since she is found to have progression of the disease. She couldn't handle well on FOLFIRI and panitumumab. CT scan on 10/5/20 showed overall significant progression of metastatic disease. Decision was made to rechallenge with FOLFOX along with avastin since she never receives avastin before. Will plan to start it on 10/13/20. On November 10, 2020, she presented to me for follow up. I have been following Ms. Courtney Ayala for stage IIIC low-grade invasive sigmoid colon adenocarcinoma and she is status post surgery followed by adjuvant chemotherapy with FOLFOX. CT scan on 4/10/19 showed increase in size and numbers of bilateral pulmonary nodules. CEA done the same day also showed increasing (52.9). Subsequently underwent CT guided biopsy of lung nodules on 4/24/19 and final pathology showed metastatic adenocarcinoma consistent with colorectal primary. All MELISSA study, BRAF were negative and MSI studies showed she has MSI stable disease. First line chemotherapy with FOLFOX and panitumumab was started on 5/22/19 and we stopped it on July 1, 2020 since she is found to have progression of the disease. Second chemotherapy with FOLFIRI and Panitumumab was started on July 15, 2020. We stopped it after 9/29/20 therapy since she is found to have progression of the disease. She couldn't handle well on FOLFIRI and panitumumab. CT scan on 10/5/20 showed overall significant progression of metastatic disease. Decision was made to rechallenge with FOLFOX along with avastin since she never receives avastin before. We started it on 10/20/20.  She is tolerating FOLFOX Signs: BP (!) 143/68 (Site: Right Upper Arm, Position: Sitting, Cuff Size: Medium Adult)   Pulse 87   Temp 98 °F (36.7 °C) (Infrared)   Resp 16   Ht 5' 5\" (1.651 m)   Wt 126 lb 12.8 oz (57.5 kg)   SpO2 95%   BMI 21.10 kg/m²     Physical Exam:  CONSTITUTIONAL: awake, no apparent distress, alert, cooperative,    EYES: pupils equal, round and reactive to light, sclera clear and conjunctiva normal  ENT: Normocephalic, without obvious abnormality, atraumatic  NECK: supple, symmetrical, no jugular venous distension and no carotid bruits   HEMATOLOGIC/LYMPHATIC: no cervical, supraclavicular or axillary lymphadenopathy   LUNGS: no wheezes, VBS, no increased work of breathing and clear to auscultation, no crackles, no crackles,    CARDIOVASCULAR: regular rate and rhythm, normal S1 and S2, no murmur noted  ABDOMEN:  soft, non-distended, no masses palpated, no hepatosplenomegaly, normal bowel sounds x 4, non-tender,    MUSCULOSKELETAL: full range of motion noted, tone is normal  NEUROLOGIC: awake, alert, oriented to name, place and time. Motor skills grossly intact. SKIN: Normal skin color, texture, turgor and no jaundice.  appears intact   EXTREMITIES: no LE edema, no cyanosis, no clubbing, no leg swelling,      Labs:  Hematology:  Lab Results   Component Value Date    WBC 4.2 11/02/2020    RBC 3.24 (L) 11/02/2020    HGB 9.1 (L) 11/02/2020    HCT 28.7 (L) 11/02/2020    MCV 88.6 11/02/2020    MCH 28.1 11/02/2020    MCHC 31.7 (L) 11/02/2020    RDW 16.1 (H) 11/02/2020     11/02/2020    MPV 10.9 11/02/2020    BANDSPCT 13 (H) 10/08/2017    SEGSPCT 64.0 11/02/2020    EOSRELPCT 1.7 11/02/2020    BASOPCT 0.5 11/02/2020    LYMPHOPCT 16.3 (L) 11/02/2020    MONOPCT 17.5 (H) 11/02/2020    BANDABS 0.56 10/08/2017    SEGSABS 2.7 11/02/2020    EOSABS 0.1 11/02/2020    BASOSABS 0.0 11/02/2020    LYMPHSABS 0.7 11/02/2020    MONOSABS 0.7 11/02/2020    DIFFTYPE AUTOMATED DIFFERENTIAL 11/02/2020     No results found for: First line chemotherapy with FOLFOX and panitumumab was started on 5/22/19 and we stopped it on July 1, 2020 since she is found to have progression of the disease. Second chemotherapy with FOLFIRI and Panitumumab was started on July 15, 2020. We stopped it after 9/29/20 therapy since she is found to have progression of the disease. She couldn't handle well on FOLFIRI and panitumumab. CT scan on 10/5/20 showed overall significant progression of metastatic disease. Decision was made to rechallenge with FOLFOX along with avastin since she never receives avastin before. We started it on 10/20/20. She is tolerating FOLFOX and avastin better. She doesn't encounter any major side effects from it. I recommend her to continue with it for now and I will schedule for her next therapy. I will continue to monitor her CEA closely and will consider to have repeat scans in 3 to 4 months. If she fails to respond to FOLFOX and avastin, will consider using either lonsurf or stivarga. Her chemo induced diarrhea is getting better and I recommend her to continue with lomotil as needed basis. She has stable neuropathy and will continue to monitor it for now. I have reviewed all these plans with Ms. Shayla Lucy and she is in agreement with the plans. I answered all her questions and concerns for today. I asked her to follow up with her  primary care physician on a regular basis. I will continue to keep you updated on her progress. Thank you for allowing me to participate in the care of this very pleasant patient. Recent imaging and labs were reviewed and discussed with the patient.

## 2020-11-10 ENCOUNTER — HOSPITAL ENCOUNTER (OUTPATIENT)
Dept: INFUSION THERAPY | Age: 75
Discharge: HOME | End: 2020-11-10
Payer: MEDICARE

## 2020-11-10 ENCOUNTER — OFFICE VISIT (OUTPATIENT)
Dept: ONCOLOGY | Age: 75
End: 2020-11-10
Payer: MEDICARE

## 2020-11-10 VITALS
RESPIRATION RATE: 16 BRPM | HEART RATE: 87 BPM | WEIGHT: 126.8 LBS | BODY MASS INDEX: 21.13 KG/M2 | DIASTOLIC BLOOD PRESSURE: 68 MMHG | OXYGEN SATURATION: 95 % | HEIGHT: 65 IN | TEMPERATURE: 98 F | SYSTOLIC BLOOD PRESSURE: 143 MMHG

## 2020-11-10 PROCEDURE — 1123F ACP DISCUSS/DSCN MKR DOCD: CPT | Performed by: INTERNAL MEDICINE

## 2020-11-10 PROCEDURE — G8427 DOCREV CUR MEDS BY ELIG CLIN: HCPCS | Performed by: INTERNAL MEDICINE

## 2020-11-10 PROCEDURE — G8484 FLU IMMUNIZE NO ADMIN: HCPCS | Performed by: INTERNAL MEDICINE

## 2020-11-10 PROCEDURE — 1036F TOBACCO NON-USER: CPT | Performed by: INTERNAL MEDICINE

## 2020-11-10 PROCEDURE — 99213 OFFICE O/P EST LOW 20 MIN: CPT | Performed by: INTERNAL MEDICINE

## 2020-11-10 PROCEDURE — 4040F PNEUMOC VAC/ADMIN/RCVD: CPT | Performed by: INTERNAL MEDICINE

## 2020-11-10 PROCEDURE — 1090F PRES/ABSN URINE INCON ASSESS: CPT | Performed by: INTERNAL MEDICINE

## 2020-11-10 PROCEDURE — G8400 PT W/DXA NO RESULTS DOC: HCPCS | Performed by: INTERNAL MEDICINE

## 2020-11-10 PROCEDURE — 99211 OFF/OP EST MAY X REQ PHY/QHP: CPT

## 2020-11-10 PROCEDURE — G8420 CALC BMI NORM PARAMETERS: HCPCS | Performed by: INTERNAL MEDICINE

## 2020-11-10 PROCEDURE — 3017F COLORECTAL CA SCREEN DOC REV: CPT | Performed by: INTERNAL MEDICINE

## 2020-11-10 NOTE — PROGRESS NOTES
MA Rooming Questions  Patient: Remberto Mccrary  MRN: [id number]    Date: 11/10/2020        1. Do you have any new issues?   no         2. Do you need any refills on medications?    no    3. Have you had any imaging done since your last visit?   no    4. Have you been hospitalized or seen in the emergency room since your last visit here?   no    5. Did the patient have a depression screening completed today?  No    No data recorded     PHQ-9 Given to (if applicable):               PHQ-9 Score (if applicable):                     [] Positive     []  Negative              Does question #9 need addressed (if applicable)                     [] Yes    []  No               Shemar Tinsley MA

## 2020-11-16 ENCOUNTER — HOSPITAL ENCOUNTER (OUTPATIENT)
Age: 75
Discharge: HOME | End: 2020-11-16
Payer: MEDICARE

## 2020-11-16 ENCOUNTER — HOSPITAL ENCOUNTER (OUTPATIENT)
Dept: INFUSION THERAPY | Age: 75
Discharge: HOME | End: 2020-11-16
Payer: MEDICARE

## 2020-11-16 ENCOUNTER — TELEPHONE (OUTPATIENT)
Dept: INFUSION THERAPY | Age: 75
End: 2020-11-16

## 2020-11-16 DIAGNOSIS — C18.9 COLON CANCER METASTASIZED TO LUNG (HCC): ICD-10-CM

## 2020-11-16 DIAGNOSIS — C78.00 COLON CANCER METASTASIZED TO LUNG (HCC): ICD-10-CM

## 2020-11-16 DIAGNOSIS — C18.7 MALIGNANT NEOPLASM OF SIGMOID COLON (HCC): ICD-10-CM

## 2020-11-16 LAB
ALBUMIN SERPL-MCNC: 3.4 GM/DL (ref 3.4–5)
ALP BLD-CCNC: 254 IU/L (ref 40–128)
ALT SERPL-CCNC: 20 U/L (ref 10–40)
ANION GAP SERPL CALCULATED.3IONS-SCNC: 10 MMOL/L (ref 4–16)
AST SERPL-CCNC: 32 IU/L (ref 15–37)
BASOPHILS ABSOLUTE: 0 K/CU MM
BASOPHILS RELATIVE PERCENT: 0.5 % (ref 0–1)
BILIRUB SERPL-MCNC: 0.5 MG/DL (ref 0–1)
BUN BLDV-MCNC: 15 MG/DL (ref 6–23)
CALCIUM SERPL-MCNC: 9 MG/DL (ref 8.3–10.6)
CEA: 114.6 NG/ML
CHLORIDE BLD-SCNC: 106 MMOL/L (ref 99–110)
CO2: 25 MMOL/L (ref 21–32)
CREAT SERPL-MCNC: 0.8 MG/DL (ref 0.6–1.1)
CREATININE URINE: 45 MG/DL (ref 28–217)
DIFFERENTIAL TYPE: ABNORMAL
EOSINOPHILS ABSOLUTE: 0 K/CU MM
EOSINOPHILS RELATIVE PERCENT: 0.5 % (ref 0–3)
GFR AFRICAN AMERICAN: >60 ML/MIN/1.73M2
GFR AFRICAN AMERICAN: >60 ML/MIN/1.73M2
GFR NON-AFRICAN AMERICAN: >60 ML/MIN/1.73M2
GFR NON-AFRICAN AMERICAN: >60 ML/MIN/1.73M2
GLUCOSE BLD-MCNC: 117 MG/DL (ref 70–99)
GLUCOSE BLD-MCNC: 122 MG/DL (ref 70–99)
HCT VFR BLD CALC: 27.9 % (ref 37–47)
HEMOGLOBIN: 8.7 GM/DL (ref 12.5–16)
LYMPHOCYTES ABSOLUTE: 0.9 K/CU MM
LYMPHOCYTES RELATIVE PERCENT: 15.5 % (ref 24–44)
MCH RBC QN AUTO: 27.4 PG (ref 27–31)
MCHC RBC AUTO-ENTMCNC: 31.2 % (ref 32–36)
MCV RBC AUTO: 88 FL (ref 78–100)
MONOCYTES ABSOLUTE: 1 K/CU MM
MONOCYTES RELATIVE PERCENT: 17.3 % (ref 0–4)
PDW BLD-RTO: 16.4 % (ref 11.7–14.9)
PLATELET # BLD: 116 K/CU MM (ref 140–440)
PMV BLD AUTO: 9.8 FL (ref 7.5–11.1)
POC CALCIUM: 1.27 MMOL/L (ref 1.12–1.32)
POC CHLORIDE: 109 MMOL/L (ref 98–109)
POC CREATININE: 0.8 MG/DL (ref 0.6–1.1)
POTASSIUM SERPL-SCNC: 3.7 MMOL/L (ref 3.6–5.1)
POTASSIUM SERPL-SCNC: 3.9 MMOL/L (ref 3.5–5.1)
PROT/CREAT RATIO, UR: 0.2
RBC # BLD: 3.17 M/CU MM (ref 4.2–5.4)
SEGMENTED NEUTROPHILS ABSOLUTE COUNT: 4 K/CU MM
SEGMENTED NEUTROPHILS RELATIVE PERCENT: 66.2 % (ref 36–66)
SODIUM BLD-SCNC: 141 MMOL/L (ref 135–145)
SODIUM BLD-SCNC: 144 MMOL/L (ref 136–145)
SOURCE, BLOOD GAS: ABNORMAL
TOTAL PROTEIN: 5.9 GM/DL (ref 6.4–8.2)
URINE TOTAL PROTEIN: 7.1 MG/DL
WBC # BLD: 6 K/CU MM (ref 4–10.5)

## 2020-11-16 PROCEDURE — 82570 ASSAY OF URINE CREATININE: CPT

## 2020-11-16 PROCEDURE — 82378 CARCINOEMBRYONIC ANTIGEN: CPT

## 2020-11-16 PROCEDURE — 80053 COMPREHEN METABOLIC PANEL: CPT

## 2020-11-16 PROCEDURE — 36415 COLL VENOUS BLD VENIPUNCTURE: CPT

## 2020-11-16 PROCEDURE — 84156 ASSAY OF PROTEIN URINE: CPT

## 2020-11-16 PROCEDURE — 85025 COMPLETE CBC W/AUTO DIFF WBC: CPT

## 2020-11-16 NOTE — TELEPHONE ENCOUNTER
LVM for pt stating we need her to come back for a urine test as it was not done when she was here for her lab draw. We need this to order her pump per RN.; asked pt to return my call or come back to the office at least around 11:00 as pumps are ordered by 12.

## 2020-11-17 ENCOUNTER — HOSPITAL ENCOUNTER (OUTPATIENT)
Dept: INFUSION THERAPY | Age: 75
Discharge: HOME | End: 2020-11-17
Payer: MEDICARE

## 2020-11-17 VITALS
WEIGHT: 130.2 LBS | RESPIRATION RATE: 16 BRPM | HEART RATE: 76 BPM | BODY MASS INDEX: 21.69 KG/M2 | TEMPERATURE: 98.4 F | HEIGHT: 65 IN | SYSTOLIC BLOOD PRESSURE: 139 MMHG | OXYGEN SATURATION: 99 % | DIASTOLIC BLOOD PRESSURE: 70 MMHG

## 2020-11-17 DIAGNOSIS — C18.7 MALIGNANT NEOPLASM OF SIGMOID COLON (HCC): Primary | ICD-10-CM

## 2020-11-17 PROCEDURE — 96411 CHEMO IV PUSH ADDL DRUG: CPT

## 2020-11-17 PROCEDURE — 2580000003 HC RX 258: Performed by: INTERNAL MEDICINE

## 2020-11-17 PROCEDURE — 96368 THER/DIAG CONCURRENT INF: CPT

## 2020-11-17 PROCEDURE — 96415 CHEMO IV INFUSION ADDL HR: CPT

## 2020-11-17 PROCEDURE — 96417 CHEMO IV INFUS EACH ADDL SEQ: CPT

## 2020-11-17 PROCEDURE — 96375 TX/PRO/DX INJ NEW DRUG ADDON: CPT

## 2020-11-17 PROCEDURE — 6360000002 HC RX W HCPCS: Performed by: INTERNAL MEDICINE

## 2020-11-17 PROCEDURE — 96413 CHEMO IV INFUSION 1 HR: CPT

## 2020-11-17 RX ORDER — PALONOSETRON 0.05 MG/ML
0.25 INJECTION, SOLUTION INTRAVENOUS ONCE
Status: COMPLETED | OUTPATIENT
Start: 2020-11-17 | End: 2020-11-17

## 2020-11-17 RX ORDER — SODIUM CHLORIDE 9 MG/ML
INJECTION, SOLUTION INTRAVENOUS ONCE
Status: COMPLETED | OUTPATIENT
Start: 2020-11-17 | End: 2020-11-18

## 2020-11-17 RX ORDER — DEXTROSE MONOHYDRATE 50 MG/ML
INJECTION, SOLUTION INTRAVENOUS ONCE
Status: COMPLETED | OUTPATIENT
Start: 2020-11-17 | End: 2020-11-18

## 2020-11-17 RX ADMIN — DEXTROSE MONOHYDRATE: 50 INJECTION, SOLUTION INTRAVENOUS at 08:26

## 2020-11-17 RX ADMIN — SODIUM CHLORIDE 577.5 MG: 9 INJECTION, SOLUTION INTRAVENOUS at 09:12

## 2020-11-17 RX ADMIN — OXALIPLATIN 138.5 MG: 5 INJECTION, SOLUTION INTRAVENOUS at 09:58

## 2020-11-17 RX ADMIN — FLUOROURACIL 650 MG: 50 INJECTION, SOLUTION INTRAVENOUS at 12:30

## 2020-11-17 RX ADMIN — DEXAMETHASONE SODIUM PHOSPHATE 12 MG: 4 INJECTION, SOLUTION INTRAMUSCULAR; INTRAVENOUS at 08:27

## 2020-11-17 RX ADMIN — PALONOSETRON 0.25 MG: 0.25 INJECTION, SOLUTION INTRAVENOUS at 08:27

## 2020-11-17 RX ADMIN — SODIUM CHLORIDE: 9 INJECTION, SOLUTION INTRAVENOUS at 08:26

## 2020-11-17 RX ADMIN — DEXTROSE MONOHYDRATE 200 MG: 50 INJECTION, SOLUTION INTRAVENOUS at 09:58

## 2020-11-17 NOTE — PROGRESS NOTES
Patient arrived to treatment suite for pre-medications, immunotherapy and chemotherapy infusion, and connection of a home infusion pump. Right chest mediport accessed and good blood return noted. Patient has no questions or concerns for the doctor at this time. States that she felt pretty good after the last treatment. Treatment approved and given. Patient tolerated well. Mediport left accessed for home infusion. Patient's status assessed and documented appropriately. All labs and required results were also reviewed today. Treatment parameters have been reviewed. Today's treatment has been approved by the provider. Treatment orders and medication sequencing (when applicable) was verified by 2 registered nurses. The treatment plan was confirmed with the patient prior to administration, and the patient understands the need to report any treatment-related symptoms. Prior to administration, when applicable, the following 8 elements of medication administration were reviewed with 2nd Registered Nurse prior to dosing: drug name, drug dose, infusion volume when prepared in a syringe, rate of administration, expiration dates and/or times, appearance and integrity of drug(s), and rate of pump for infusion. The 5 rights of medication administration have been verified.

## 2020-11-19 ENCOUNTER — HOSPITAL ENCOUNTER (OUTPATIENT)
Dept: INFUSION THERAPY | Age: 75
Discharge: HOME | End: 2020-11-19
Payer: MEDICARE

## 2020-11-19 DIAGNOSIS — C18.7 MALIGNANT NEOPLASM OF SIGMOID COLON (HCC): Primary | ICD-10-CM

## 2020-11-19 PROCEDURE — 2580000003 HC RX 258: Performed by: INTERNAL MEDICINE

## 2020-11-19 RX ORDER — SODIUM CHLORIDE 0.9 % (FLUSH) 0.9 %
10 SYRINGE (ML) INJECTION PRN
Status: CANCELLED | OUTPATIENT
Start: 2020-11-19

## 2020-11-19 RX ORDER — HEPARIN SODIUM (PORCINE) LOCK FLUSH IV SOLN 100 UNIT/ML 100 UNIT/ML
500 SOLUTION INTRAVENOUS PRN
Status: DISCONTINUED | OUTPATIENT
Start: 2020-11-19 | End: 2020-11-20 | Stop reason: HOSPADM

## 2020-11-19 RX ORDER — HEPARIN SODIUM (PORCINE) LOCK FLUSH IV SOLN 100 UNIT/ML 100 UNIT/ML
500 SOLUTION INTRAVENOUS PRN
Status: CANCELLED | OUTPATIENT
Start: 2020-11-19

## 2020-11-19 RX ORDER — SODIUM CHLORIDE 0.9 % (FLUSH) 0.9 %
20 SYRINGE (ML) INJECTION PRN
Status: CANCELLED | OUTPATIENT
Start: 2020-11-19

## 2020-11-19 RX ORDER — SODIUM CHLORIDE 0.9 % (FLUSH) 0.9 %
20 SYRINGE (ML) INJECTION PRN
Status: DISCONTINUED | OUTPATIENT
Start: 2020-11-19 | End: 2020-11-20 | Stop reason: HOSPADM

## 2020-11-19 RX ADMIN — HEPARIN SODIUM (PORCINE) LOCK FLUSH IV SOLN 100 UNIT/ML 500 UNITS: 100 SOLUTION at 12:02

## 2020-11-19 RX ADMIN — SODIUM CHLORIDE, PRESERVATIVE FREE 20 ML: 5 INJECTION INTRAVENOUS at 12:02

## 2020-11-29 NOTE — PROGRESS NOTES
Patient Name: Maurisio Evans  Patient : 1945  Patient MRN: [id number]     Primary Oncologist: Ibeth Alvares MD  Referring Provider: Terrel Barthel, MD     Date of Service: 2020      Chief Complaint:   Chief Complaint   Patient presents with    Follow-up    Chemotherapy     Patient Active Problem List:     Malignant neoplasm of sigmoid colon      Pleural effusion, right     Colon cancer metastasized to lung     Anemia     Drug induced neutropenia    HPI:   Ms. 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 She initially presented to [facility] on 2017, with nausea and vomiting. She had a CT angiogram of the abdomen on admission, 2017, and it showed irregular, asymmetric wall thickening involving a 4 cm segment of sigmoid colon with somewhat nodular infiltration of adjacent fat. Dilatation proximally which is most prominent involving the colon at the hepatic flexure where it measures up to 8.9 cm. Findings are concerning for obstructing sigmoid neoplasm. She also has a mildly enlarged lymph node anterior to the distal iliac vasculature. She was subsequently evaluated by Dr. Levra Mckeon and she underwent exploratory laparotomy, sigmoid colon resection with end colostomy, bladder dome resection and closure, segment of small bowel resection, on 2017. Final pathology revealed low-grade invasive adenocarcinoma. Chronic inflammation and fibrosis with focal abscess associated with invasive adenocarcinoma. Small segment of small intestine revealed mild chronic nonspecific mucosal enteritis.   Tumor size was 4.3 x 4.5 cm in diameter and all the surgical margins were negative. Lymphovascular invasion and perineural invasion were present. Eight out of 11 lymph nodes examined were positive for metastasis (pT3 pN2b pMx). Her CEA level on October 8, 2017, was 6.5 ng/mL. She was discharged from the hospital with an appointment to see me as an outpatient. After a long discussion with Ms. Gracy Mendez, she has decided to have adjuvant chemotherapy with FOLFOX for her stage IIIC colon cancer. Adjuvant chemotherapy with FOLFOX was started on November 28, 2017 and we stopped it on April 24, 2018 (total 10 treatments), after she experienced syncope attack after last treatment. We decided to follow her closely. She underwent colonoscopy by Dr. Elliott Harrison on January 24, 2018 and it was a normal study according to her. She has CT scan of the chest, abdomen and pelvis on October 15, 2018 and it showed postsurgical changes from partial colectomy with left lower quadrant colostomy. Bilateral 2-6 mm pulmonary nodules, new since 2014, one of which has slightly increased in size since 2017 in the left upper lobe. These are suspicious for metastatic disease. Grossly stable 1.2 cm hypoenhancing lesion in segment VII of liver which was less conspicuous on the prior exam. Given stability, findings favor benign process. No new findings of metastatic disease in the abdomen or pelvis. Cholelithiasis. CT scan of the chest done on 4/10/19 showed increased size and number bilateral pulmonary metastases. At least 4 liver lesions as above, at least 1 appearing unchanged since 07/28/2014. Metastatic disease, primary malignancy, and benign processes such as cysts and hemangioma should be considered. Recommend further evaluation with liver protocol abdomen MRI (using a nonhepatobiliary contrast agent) or CT. She underwent CT guided biopsy of the lung nodules on 4/24/19 and final pathology showed metastatic adenocarcinoma, consistent with a colorectal primary.      KRAS, NRAS, BRAF study were negative. MSI reveals stable disease. Since she is found to have metastatic colon cancer, we started first line chemotherapy with FOLFOX and panitumumab since 5/22/19. We stopped it on July 1, 2020 since she has progression of the disease. Second line chemotherapy with FOLFIRI and panitumumab was started on 7/15/20. We stopped it after 9/29/20 therapy since she is found to have progression of the disease. She couldn't handle well on FOLFIRI and panitumumab. CT scan on 10/5/20 showed overall significant progression of metastatic disease. Decision was made to rechallenge with FOLFOX along with avastin since she never receives avastin before. Will plan to start it on 10/13/20. On December 1, 2020, she presented to me for follow up. I have been following Ms. Coorna Callejas for stage IIIC low-grade invasive sigmoid colon adenocarcinoma and she is status post surgery followed by adjuvant chemotherapy with FOLFOX. CT scan on 4/10/19 showed increase in size and numbers of bilateral pulmonary nodules. CEA done the same day also showed increasing (52.9). Subsequently underwent CT guided biopsy of lung nodules on 4/24/19 and final pathology showed metastatic adenocarcinoma consistent with colorectal primary. All MELISSA study, BRAF were negative and MSI studies showed she has MSI stable disease. First line chemotherapy with FOLFOX and panitumumab was started on 5/22/19 and we stopped it on July 1, 2020 since she is found to have progression of the disease. Second chemotherapy with FOLFIRI and Panitumumab was started on July 15, 2020. We stopped it after 9/29/20 therapy since she is found to have progression of the disease. She couldn't handle well on FOLFIRI and panitumumab. CT scan on 10/5/20 showed overall significant progression of metastatic disease. Decision was made to rechallenge with FOLFOX along with avastin since she never receives avastin before. We started it on 10/20/20.  She is tolerating FOLFOX and avastin better. She doesn't encounter any major side effects from it. I recommend her to continue with it for now and I will schedule for her next therapy. I will continue to monitor her CEA closely and will consider to have repeat scans in 3 to 4 months. If she fails to respond to FOLFOX and avastin, will consider using either lonsurf or stivarga. 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 I will continue to monitor her neuropathy closely. Besides that, she does not have any other significant symptoms at today's visit. Past Medical History  1. Hypertension. 2.    Hyperlipidemia. 3.    Aortic stenosis status post aortic valve replacement. 4.    Gastroesophageal reflux disease. Surgical History  1. Right breast lumpectomy in 1980.  2.    Coronary artery bypass grafting on July 30, 2014. 3.    Total abdominal hysterectomy in 1980s. 4.    Exploratory laparotomy, small bowel resection, colon resection, partial bladder resection and repair, creation of colostomy on October 7, 2017. Allergies  NKDA     Social History  She denies smoking, alcohol drinking, and illicit drug abuse. She is  and currently living in [facility]. Family History  No pertinent family history. Review of Systems: \"Per interval history; otherwise 10 point ROS is negative. \"  Her energy level is fair, appetite and sleep are good. She doesn't have fever, chills, night sweats, cough, shortness of breath, chest pain, hemoptysis or palpitation. Her bowels and bladder functions are normal. She denies nausea, vomiting, abdominal pain, diarrhea, constipation, dysuria, loss of appetite or weight loss. Her chemo induced neuropathy is a little getting worse. She denies bleeding or clotting issues. No anxiety or depression. She doesn't have any pain on today's visit.   The rest of the systems are unremarkable. Vital Signs:  BP (!) 156/72 (Position: Sitting)   Pulse 79   Temp 98 °F (36.7 °C) (Infrared)   Resp 16   Ht 5' 5\" (1.651 m)   Wt 130 lb (59 kg)   SpO2 95%   BMI 21.63 kg/m²     Physical Exam:  CONSTITUTIONAL: awake, alert, no apparent distress, cooperative,    EYES: pupils equal, round and reactive to light, sclera clear and conjunctiva normal  ENT: Normocephalic, without obvious abnormality, atraumatic  NECK: supple, symmetrical, no jugular venous distension and no carotid bruits   HEMATOLOGIC/LYMPHATIC: no cervical, supraclavicular or axillary lymphadenopathy   LUNGS: no wheezes, VBS, no crackles, no increased work of breathing and clear to auscultation, no crackles,    CARDIOVASCULAR: regular rate and rhythm, normal S1 and S2, no murmur noted  ABDOMEN:  soft, no masses palpated, non-distended, no hepatosplenomegaly, normal bowel sounds x 4, non-tender,    MUSCULOSKELETAL: full range of motion noted, tone is normal  NEUROLOGIC: awake, alert, oriented to name, place and time. Motor skills grossly intact. SKIN: Normal skin color, texture, turgor and no jaundice.  appears intact   EXTREMITIES: no LE edema, no leg swelling, no cyanosis, no clubbing,       Labs:  Hematology:  Lab Results   Component Value Date    WBC 3.1 (L) 11/30/2020    RBC 2.82 (L) 11/30/2020    HGB 7.8 (L) 11/30/2020    HCT 24.8 (L) 11/30/2020    MCV 87.9 11/30/2020    MCH 27.7 11/30/2020    MCHC 31.5 (L) 11/30/2020    RDW 16.9 (H) 11/30/2020     (L) 11/30/2020    MPV 9.8 11/30/2020    BANDSPCT 13 (H) 10/08/2017    SEGSPCT 47.7 11/30/2020    EOSRELPCT 0.3 11/30/2020    BASOPCT 0.7 11/30/2020    LYMPHOPCT 28.8 11/30/2020    MONOPCT 22.5 (H) 11/30/2020    BANDABS 0.56 10/08/2017    SEGSABS 1.5 11/30/2020    EOSABS 0.0 11/30/2020    BASOSABS 0.0 11/30/2020    LYMPHSABS 0.9 11/30/2020    MONOSABS 0.7 11/30/2020    DIFFTYPE AUTOMATED DIFFERENTIAL 11/30/2020     No results found for: ESR  Chemistry:  Lab Results studies showed she has MSI stable disease. First line chemotherapy with FOLFOX and panitumumab was started on 5/22/19 and we stopped it on July 1, 2020 since she is found to have progression of the disease. Second chemotherapy with FOLFIRI and Panitumumab was started on July 15, 2020. We stopped it after 9/29/20 therapy since she is found to have progression of the disease. She couldn't handle well on FOLFIRI and panitumumab. CT scan on 10/5/20 showed overall significant progression of metastatic disease. Decision was made to rechallenge with FOLFOX along with avastin since she never receives avastin before. We started it on 10/20/20. She is tolerating FOLFOX and avastin better. She doesn't encounter any major side effects from it. I recommend her to continue with it for now and I will schedule for her next therapy. I will continue to monitor her CEA closely and will consider to have repeat scans in 3 to 4 months. If she fails to respond to FOLFOX and avastin, will consider using either lonsurf or stivarga. 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 I will continue to monitor her neuropathy closely. I answered all her questions and concerns for today. I asked her to follow up with her  primary care physician on a regular basis. Recent imaging and labs were reviewed and discussed with the patient.

## 2020-11-30 ENCOUNTER — HOSPITAL ENCOUNTER (OUTPATIENT)
Dept: INFUSION THERAPY | Age: 75
Discharge: HOME | End: 2020-11-30
Payer: MEDICARE

## 2020-11-30 DIAGNOSIS — C18.9 COLON CANCER METASTASIZED TO LUNG (HCC): ICD-10-CM

## 2020-11-30 DIAGNOSIS — C78.00 COLON CANCER METASTASIZED TO LUNG (HCC): ICD-10-CM

## 2020-11-30 LAB
ALBUMIN SERPL-MCNC: 3.2 GM/DL (ref 3.4–5)
ALP BLD-CCNC: 210 IU/L (ref 40–129)
ALT SERPL-CCNC: 23 U/L (ref 10–40)
ANION GAP SERPL CALCULATED.3IONS-SCNC: 8 MMOL/L (ref 4–16)
AST SERPL-CCNC: 36 IU/L (ref 15–37)
BASOPHILS ABSOLUTE: 0 K/CU MM
BASOPHILS RELATIVE PERCENT: 0.7 % (ref 0–1)
BILIRUB SERPL-MCNC: 0.4 MG/DL (ref 0–1)
BUN BLDV-MCNC: 15 MG/DL (ref 6–23)
CALCIUM SERPL-MCNC: 9 MG/DL (ref 8.3–10.6)
CHLORIDE BLD-SCNC: 106 MMOL/L (ref 99–110)
CO2: 26 MMOL/L (ref 21–32)
CREAT SERPL-MCNC: 0.8 MG/DL (ref 0.6–1.1)
DIFFERENTIAL TYPE: ABNORMAL
EOSINOPHILS ABSOLUTE: 0 K/CU MM
EOSINOPHILS RELATIVE PERCENT: 0.3 % (ref 0–3)
GFR AFRICAN AMERICAN: >60 ML/MIN/1.73M2
GFR AFRICAN AMERICAN: >60 ML/MIN/1.73M2
GFR NON-AFRICAN AMERICAN: >60 ML/MIN/1.73M2
GFR NON-AFRICAN AMERICAN: >60 ML/MIN/1.73M2
GLUCOSE BLD-MCNC: 151 MG/DL (ref 70–99)
GLUCOSE BLD-MCNC: 162 MG/DL (ref 70–99)
HCT VFR BLD CALC: 24.8 % (ref 37–47)
HEMOGLOBIN: 7.8 GM/DL (ref 12.5–16)
LYMPHOCYTES ABSOLUTE: 0.9 K/CU MM
LYMPHOCYTES RELATIVE PERCENT: 28.8 % (ref 24–44)
MCH RBC QN AUTO: 27.7 PG (ref 27–31)
MCHC RBC AUTO-ENTMCNC: 31.5 % (ref 32–36)
MCV RBC AUTO: 87.9 FL (ref 78–100)
MONOCYTES ABSOLUTE: 0.7 K/CU MM
MONOCYTES RELATIVE PERCENT: 22.5 % (ref 0–4)
PDW BLD-RTO: 16.9 % (ref 11.7–14.9)
PLATELET # BLD: 126 K/CU MM (ref 140–440)
PMV BLD AUTO: 9.8 FL (ref 7.5–11.1)
POC CALCIUM: 1.26 MMOL/L (ref 1.12–1.32)
POC CHLORIDE: 110 MMOL/L (ref 98–109)
POC CREATININE: 0.9 MG/DL (ref 0.6–1.1)
POTASSIUM SERPL-SCNC: 3.7 MMOL/L (ref 3.6–5.1)
POTASSIUM SERPL-SCNC: 3.8 MMOL/L (ref 3.5–5.1)
PROTEIN UA: ABNORMAL MG/DL
RBC # BLD: 2.82 M/CU MM (ref 4.2–5.4)
SEGMENTED NEUTROPHILS ABSOLUTE COUNT: 1.5 K/CU MM
SEGMENTED NEUTROPHILS RELATIVE PERCENT: 47.7 % (ref 36–66)
SODIUM BLD-SCNC: 140 MMOL/L (ref 135–145)
SODIUM BLD-SCNC: 145 MMOL/L (ref 136–145)
SOURCE, BLOOD GAS: ABNORMAL
TOTAL PROTEIN: 5.7 GM/DL (ref 6.4–8.2)
WBC # BLD: 3.1 K/CU MM (ref 4–10.5)

## 2020-11-30 PROCEDURE — 36415 COLL VENOUS BLD VENIPUNCTURE: CPT

## 2020-11-30 PROCEDURE — 81003 URINALYSIS AUTO W/O SCOPE: CPT

## 2020-11-30 PROCEDURE — 80053 COMPREHEN METABOLIC PANEL: CPT

## 2020-11-30 PROCEDURE — 85025 COMPLETE CBC W/AUTO DIFF WBC: CPT

## 2020-12-01 ENCOUNTER — HOSPITAL ENCOUNTER (OUTPATIENT)
Dept: INFUSION THERAPY | Age: 75
Discharge: HOME | End: 2020-12-01
Payer: MEDICARE

## 2020-12-01 ENCOUNTER — OFFICE VISIT (OUTPATIENT)
Dept: ONCOLOGY | Age: 75
End: 2020-12-01
Payer: MEDICARE

## 2020-12-01 VITALS
RESPIRATION RATE: 16 BRPM | WEIGHT: 130.8 LBS | TEMPERATURE: 98 F | BODY MASS INDEX: 21.77 KG/M2 | OXYGEN SATURATION: 95 % | HEART RATE: 79 BPM | DIASTOLIC BLOOD PRESSURE: 72 MMHG | SYSTOLIC BLOOD PRESSURE: 156 MMHG

## 2020-12-01 VITALS
OXYGEN SATURATION: 95 % | WEIGHT: 130 LBS | BODY MASS INDEX: 21.66 KG/M2 | SYSTOLIC BLOOD PRESSURE: 156 MMHG | DIASTOLIC BLOOD PRESSURE: 72 MMHG | RESPIRATION RATE: 16 BRPM | HEIGHT: 65 IN | TEMPERATURE: 98 F | HEART RATE: 79 BPM

## 2020-12-01 DIAGNOSIS — C18.7 MALIGNANT NEOPLASM OF SIGMOID COLON (HCC): Primary | ICD-10-CM

## 2020-12-01 PROCEDURE — 96413 CHEMO IV INFUSION 1 HR: CPT

## 2020-12-01 PROCEDURE — G8484 FLU IMMUNIZE NO ADMIN: HCPCS | Performed by: INTERNAL MEDICINE

## 2020-12-01 PROCEDURE — 1090F PRES/ABSN URINE INCON ASSESS: CPT | Performed by: INTERNAL MEDICINE

## 2020-12-01 PROCEDURE — 96409 CHEMO IV PUSH SNGL DRUG: CPT

## 2020-12-01 PROCEDURE — 4040F PNEUMOC VAC/ADMIN/RCVD: CPT | Performed by: INTERNAL MEDICINE

## 2020-12-01 PROCEDURE — G8427 DOCREV CUR MEDS BY ELIG CLIN: HCPCS | Performed by: INTERNAL MEDICINE

## 2020-12-01 PROCEDURE — 96417 CHEMO IV INFUS EACH ADDL SEQ: CPT

## 2020-12-01 PROCEDURE — 1123F ACP DISCUSS/DSCN MKR DOCD: CPT | Performed by: INTERNAL MEDICINE

## 2020-12-01 PROCEDURE — 96374 THER/PROPH/DIAG INJ IV PUSH: CPT

## 2020-12-01 PROCEDURE — 3017F COLORECTAL CA SCREEN DOC REV: CPT | Performed by: INTERNAL MEDICINE

## 2020-12-01 PROCEDURE — G8400 PT W/DXA NO RESULTS DOC: HCPCS | Performed by: INTERNAL MEDICINE

## 2020-12-01 PROCEDURE — 6360000002 HC RX W HCPCS: Performed by: INTERNAL MEDICINE

## 2020-12-01 PROCEDURE — 2580000003 HC RX 258: Performed by: INTERNAL MEDICINE

## 2020-12-01 PROCEDURE — 1036F TOBACCO NON-USER: CPT | Performed by: INTERNAL MEDICINE

## 2020-12-01 PROCEDURE — 99214 OFFICE O/P EST MOD 30 MIN: CPT | Performed by: INTERNAL MEDICINE

## 2020-12-01 PROCEDURE — G8420 CALC BMI NORM PARAMETERS: HCPCS | Performed by: INTERNAL MEDICINE

## 2020-12-01 PROCEDURE — 96415 CHEMO IV INFUSION ADDL HR: CPT

## 2020-12-01 PROCEDURE — 96375 TX/PRO/DX INJ NEW DRUG ADDON: CPT

## 2020-12-01 RX ORDER — SODIUM CHLORIDE 9 MG/ML
INJECTION, SOLUTION INTRAVENOUS ONCE
Status: DISCONTINUED | OUTPATIENT
Start: 2020-12-01 | End: 2020-12-02 | Stop reason: HOSPADM

## 2020-12-01 RX ORDER — DEXTROSE MONOHYDRATE 50 MG/ML
INJECTION, SOLUTION INTRAVENOUS ONCE
Status: DISCONTINUED | OUTPATIENT
Start: 2020-12-01 | End: 2020-12-02 | Stop reason: HOSPADM

## 2020-12-01 RX ORDER — PALONOSETRON 0.05 MG/ML
0.25 INJECTION, SOLUTION INTRAVENOUS ONCE
Status: COMPLETED | OUTPATIENT
Start: 2020-12-01 | End: 2020-12-01

## 2020-12-01 RX ORDER — DULOXETIN HYDROCHLORIDE 60 MG/1
60 CAPSULE, DELAYED RELEASE ORAL DAILY
Qty: 30 CAPSULE | Refills: 3 | Status: ON HOLD | OUTPATIENT
Start: 2020-12-01 | End: 2020-12-18 | Stop reason: HOSPADM

## 2020-12-01 RX ADMIN — FLUOROURACIL 650 MG: 50 INJECTION, SOLUTION INTRAVENOUS at 13:11

## 2020-12-01 RX ADMIN — PALONOSETRON 0.25 MG: 0.25 INJECTION, SOLUTION INTRAVENOUS at 09:19

## 2020-12-01 RX ADMIN — OXALIPLATIN 138.5 MG: 5 INJECTION, SOLUTION INTRAVENOUS at 10:46

## 2020-12-01 RX ADMIN — DEXAMETHASONE SODIUM PHOSPHATE 12 MG: 4 INJECTION INTRA-ARTICULAR; INTRALESIONAL; INTRAMUSCULAR; INTRAVENOUS; SOFT TISSUE at 09:19

## 2020-12-01 RX ADMIN — SODIUM CHLORIDE: 9 INJECTION, SOLUTION INTRAVENOUS at 09:19

## 2020-12-01 RX ADMIN — DEXTROSE MONOHYDRATE: 50 INJECTION, SOLUTION INTRAVENOUS at 09:57

## 2020-12-01 RX ADMIN — SODIUM CHLORIDE 600 MG: 9 INJECTION, SOLUTION INTRAVENOUS at 10:03

## 2020-12-01 RX ADMIN — DEXTROSE MONOHYDRATE 200 MG: 50 INJECTION, SOLUTION INTRAVENOUS at 10:46

## 2020-12-01 ASSESSMENT — PAIN SCALES - GENERAL: PAINLEVEL_OUTOF10: 0

## 2020-12-01 NOTE — PROGRESS NOTES
MA Rooming Questions  Patient: Remberto Mccrary  MRN: [id number]    Date: 12/1/2020        1. Do you have any new issues? Yes- numbness and tingling in hands and feet. 2. Do you need any refills on medications?    no    3. Have you had any imaging done since your last visit?   no    4. Have you been hospitalized or seen in the emergency room since your last visit here?   no    5. Did the patient have a depression screening completed today?  No    No data recorded     PHQ-9 Given to (if applicable):               PHQ-9 Score (if applicable):                     [] Positive     []  Negative              Does question #9 need addressed (if applicable)                     [] Yes    []  No               Merrick Qiu MA

## 2020-12-03 ENCOUNTER — HOSPITAL ENCOUNTER (OUTPATIENT)
Dept: INFUSION THERAPY | Age: 75
Discharge: HOME | End: 2020-12-03
Payer: MEDICARE

## 2020-12-03 DIAGNOSIS — C18.7 MALIGNANT NEOPLASM OF SIGMOID COLON (HCC): Primary | ICD-10-CM

## 2020-12-03 LAB
BASOPHILS ABSOLUTE: 0 K/CU MM
BASOPHILS RELATIVE PERCENT: 0.5 % (ref 0–1)
DIFFERENTIAL TYPE: ABNORMAL
EOSINOPHILS ABSOLUTE: 0 K/CU MM
EOSINOPHILS RELATIVE PERCENT: 0 % (ref 0–3)
HCT VFR BLD CALC: 24.7 % (ref 37–47)
HEMOGLOBIN: 7.7 GM/DL (ref 12.5–16)
LYMPHOCYTES ABSOLUTE: 0.3 K/CU MM
LYMPHOCYTES RELATIVE PERCENT: 12.4 % (ref 24–44)
MCH RBC QN AUTO: 27.5 PG (ref 27–31)
MCHC RBC AUTO-ENTMCNC: 31.2 % (ref 32–36)
MCV RBC AUTO: 88.2 FL (ref 78–100)
MONOCYTES ABSOLUTE: 0.3 K/CU MM
MONOCYTES RELATIVE PERCENT: 13.3 % (ref 0–4)
PDW BLD-RTO: 17 % (ref 11.7–14.9)
PLATELET # BLD: 166 K/CU MM (ref 140–440)
PMV BLD AUTO: 9.9 FL (ref 7.5–11.1)
RBC # BLD: 2.8 M/CU MM (ref 4.2–5.4)
SEGMENTED NEUTROPHILS ABSOLUTE COUNT: 1.6 K/CU MM
SEGMENTED NEUTROPHILS RELATIVE PERCENT: 73.8 % (ref 36–66)
WBC # BLD: 2.2 K/CU MM (ref 4–10.5)

## 2020-12-03 PROCEDURE — 36415 COLL VENOUS BLD VENIPUNCTURE: CPT

## 2020-12-03 PROCEDURE — 96523 IRRIG DRUG DELIVERY DEVICE: CPT

## 2020-12-03 PROCEDURE — 85025 COMPLETE CBC W/AUTO DIFF WBC: CPT

## 2020-12-03 RX ORDER — HEPARIN SODIUM (PORCINE) LOCK FLUSH IV SOLN 100 UNIT/ML 100 UNIT/ML
500 SOLUTION INTRAVENOUS PRN
Status: CANCELLED | OUTPATIENT
Start: 2020-12-03

## 2020-12-03 RX ORDER — SODIUM CHLORIDE 0.9 % (FLUSH) 0.9 %
20 SYRINGE (ML) INJECTION PRN
Status: CANCELLED | OUTPATIENT
Start: 2020-12-03

## 2020-12-03 RX ORDER — SODIUM CHLORIDE 0.9 % (FLUSH) 0.9 %
10 SYRINGE (ML) INJECTION PRN
Status: DISCONTINUED | OUTPATIENT
Start: 2020-12-03 | End: 2020-12-04 | Stop reason: HOSPADM

## 2020-12-03 RX ORDER — SODIUM CHLORIDE 0.9 % (FLUSH) 0.9 %
10 SYRINGE (ML) INJECTION PRN
Status: CANCELLED | OUTPATIENT
Start: 2020-12-03

## 2020-12-03 RX ORDER — HEPARIN SODIUM (PORCINE) LOCK FLUSH IV SOLN 100 UNIT/ML 100 UNIT/ML
500 SOLUTION INTRAVENOUS PRN
Status: DISCONTINUED | OUTPATIENT
Start: 2020-12-03 | End: 2020-12-04 | Stop reason: HOSPADM

## 2020-12-03 NOTE — PROGRESS NOTES
Pt here for port deaccess, right chest wall port flushed with 10ml NS and heparin and deaccessed,     There was no wrist band to scan for hte pt        Pt asking if she should take her Duloxetine medication. Chart message sent to [facility] and Dr. Mohinder Corley for f/u . Pt aware someone will have to call her.

## 2020-12-04 ENCOUNTER — TELEPHONE (OUTPATIENT)
Dept: ONCOLOGY | Age: 75
End: 2020-12-04

## 2020-12-04 NOTE — PROGRESS NOTES
4238:     Called pt and informed her that per Dr. Anu Chavez, okay for pt to take her Duloxetine. Pt said it makes her drowsy, I advise she take it in the evening.

## 2020-12-04 NOTE — TELEPHONE ENCOUNTER
Called patient to inform that yes she should be taking Duloxetine for neuropathy per Dr Paulino Newman. Left .

## 2020-12-13 ENCOUNTER — APPOINTMENT (OUTPATIENT)
Dept: CT IMAGING | Age: 75
DRG: 394 | End: 2020-12-13
Payer: MEDICARE

## 2020-12-13 ENCOUNTER — HOSPITAL ENCOUNTER (INPATIENT)
Age: 75
LOS: 2 days | Discharge: HOSPICE | DRG: 394 | End: 2020-12-16
Attending: EMERGENCY MEDICINE | Admitting: INTERNAL MEDICINE
Payer: MEDICARE

## 2020-12-13 PROBLEM — K55.9 ISCHEMIA, BOWEL (HCC): Status: ACTIVE | Noted: 2020-12-13

## 2020-12-13 LAB
ALBUMIN SERPL-MCNC: 2.9 GM/DL (ref 3.4–5)
ALP BLD-CCNC: 145 IU/L (ref 40–129)
ALT SERPL-CCNC: 27 U/L (ref 10–40)
ANION GAP SERPL CALCULATED.3IONS-SCNC: 13 MMOL/L (ref 4–16)
AST SERPL-CCNC: 50 IU/L (ref 15–37)
BACTERIA: NEGATIVE /HPF
BASOPHILS ABSOLUTE: 0 K/CU MM
BASOPHILS RELATIVE PERCENT: 0.5 % (ref 0–1)
BILIRUB SERPL-MCNC: 1.6 MG/DL (ref 0–1)
BILIRUBIN URINE: NEGATIVE MG/DL
BLOOD, URINE: NEGATIVE
BUN BLDV-MCNC: 38 MG/DL (ref 6–23)
CALCIUM SERPL-MCNC: 9.1 MG/DL (ref 8.3–10.6)
CAST TYPE: NORMAL /HPF
CHLORIDE BLD-SCNC: 97 MMOL/L (ref 99–110)
CLARITY: CLEAR
CO2: 28 MMOL/L (ref 21–32)
COLOR: YELLOW
CREAT SERPL-MCNC: 1.3 MG/DL (ref 0.6–1.1)
CRYSTAL TYPE: NEGATIVE /HPF
DIFFERENTIAL TYPE: ABNORMAL
EOSINOPHILS ABSOLUTE: 0 K/CU MM
EOSINOPHILS RELATIVE PERCENT: 0 % (ref 0–3)
EPITHELIAL CELLS, UA: NEGATIVE /HPF
GFR AFRICAN AMERICAN: 48 ML/MIN/1.73M2
GFR NON-AFRICAN AMERICAN: 40 ML/MIN/1.73M2
GLUCOSE BLD-MCNC: 192 MG/DL (ref 70–99)
GLUCOSE, URINE: NEGATIVE MG/DL
HCT VFR BLD CALC: 31.4 % (ref 37–47)
HEMOGLOBIN: 9.5 GM/DL (ref 12.5–16)
IMMATURE NEUTROPHIL %: 1 % (ref 0–0.43)
KETONES, URINE: NEGATIVE MG/DL
LACTATE: 6.7 MMOL/L (ref 0.4–2)
LEUKOCYTE ESTERASE, URINE: NEGATIVE
LIPASE: 5 IU/L (ref 13–60)
LYMPHOCYTES ABSOLUTE: 0.5 K/CU MM
LYMPHOCYTES RELATIVE PERCENT: 24.5 % (ref 24–44)
MCH RBC QN AUTO: 26.8 PG (ref 27–31)
MCHC RBC AUTO-ENTMCNC: 30.3 % (ref 32–36)
MCV RBC AUTO: 88.7 FL (ref 78–100)
MONOCYTES ABSOLUTE: 0.8 K/CU MM
MONOCYTES RELATIVE PERCENT: 39.5 % (ref 0–4)
NITRITE URINE, QUANTITATIVE: NEGATIVE
PDW BLD-RTO: 16.8 % (ref 11.7–14.9)
PH, URINE: 6 (ref 5–8)
PLATELET # BLD: 125 K/CU MM (ref 140–440)
PMV BLD AUTO: 12.3 FL (ref 7.5–11.1)
POTASSIUM SERPL-SCNC: 3.5 MMOL/L (ref 3.5–5.1)
PROTEIN UA: NEGATIVE MG/DL
RBC # BLD: 3.54 M/CU MM (ref 4.2–5.4)
RBC URINE: NORMAL /HPF (ref 0–6)
SEGMENTED NEUTROPHILS ABSOLUTE COUNT: 0.7 K/CU MM
SEGMENTED NEUTROPHILS RELATIVE PERCENT: 34.5 % (ref 36–66)
SODIUM BLD-SCNC: 138 MMOL/L (ref 135–145)
SPECIFIC GRAVITY UA: 1.02 (ref 1–1.03)
TOTAL IMMATURE NEUTOROPHIL: 0.02 K/CU MM
TOTAL PROTEIN: 5.8 GM/DL (ref 6.4–8.2)
UROBILINOGEN, URINE: 0.2 MG/DL (ref 0.2–1)
WBC # BLD: 2 K/CU MM (ref 4–10.5)
WBC UA: NORMAL /HPF (ref 0–5)

## 2020-12-13 PROCEDURE — 83605 ASSAY OF LACTIC ACID: CPT

## 2020-12-13 PROCEDURE — 80053 COMPREHEN METABOLIC PANEL: CPT

## 2020-12-13 PROCEDURE — 83690 ASSAY OF LIPASE: CPT

## 2020-12-13 PROCEDURE — 74176 CT ABD & PELVIS W/O CONTRAST: CPT

## 2020-12-13 PROCEDURE — 6360000002 HC RX W HCPCS: Performed by: EMERGENCY MEDICINE

## 2020-12-13 PROCEDURE — 81001 URINALYSIS AUTO W/SCOPE: CPT

## 2020-12-13 PROCEDURE — 85025 COMPLETE CBC W/AUTO DIFF WBC: CPT

## 2020-12-13 PROCEDURE — 96361 HYDRATE IV INFUSION ADD-ON: CPT

## 2020-12-13 PROCEDURE — 2580000003 HC RX 258: Performed by: EMERGENCY MEDICINE

## 2020-12-13 PROCEDURE — 96375 TX/PRO/DX INJ NEW DRUG ADDON: CPT

## 2020-12-13 PROCEDURE — 96365 THER/PROPH/DIAG IV INF INIT: CPT

## 2020-12-13 PROCEDURE — 99284 EMERGENCY DEPT VISIT MOD MDM: CPT

## 2020-12-13 RX ORDER — ONDANSETRON 2 MG/ML
4 INJECTION INTRAMUSCULAR; INTRAVENOUS ONCE
Status: COMPLETED | OUTPATIENT
Start: 2020-12-13 | End: 2020-12-13

## 2020-12-13 RX ORDER — MORPHINE SULFATE 4 MG/ML
4 INJECTION, SOLUTION INTRAMUSCULAR; INTRAVENOUS ONCE
Status: COMPLETED | OUTPATIENT
Start: 2020-12-13 | End: 2020-12-13

## 2020-12-13 RX ORDER — 0.9 % SODIUM CHLORIDE 0.9 %
30 INTRAVENOUS SOLUTION INTRAVENOUS ONCE
Status: COMPLETED | OUTPATIENT
Start: 2020-12-13 | End: 2020-12-14

## 2020-12-13 RX ORDER — 0.9 % SODIUM CHLORIDE 0.9 %
1000 INTRAVENOUS SOLUTION INTRAVENOUS ONCE
Status: COMPLETED | OUTPATIENT
Start: 2020-12-13 | End: 2020-12-13

## 2020-12-13 RX ADMIN — MORPHINE SULFATE 4 MG: 4 INJECTION, SOLUTION INTRAMUSCULAR; INTRAVENOUS at 21:03

## 2020-12-13 RX ADMIN — PIPERACILLIN SODIUM AND TAZOBACTAM SODIUM 4.5 G: 4; .5 INJECTION, POWDER, LYOPHILIZED, FOR SOLUTION INTRAVENOUS at 23:22

## 2020-12-13 RX ADMIN — SODIUM CHLORIDE 1000 ML: 9 INJECTION, SOLUTION INTRAVENOUS at 21:02

## 2020-12-13 RX ADMIN — ONDANSETRON 4 MG: 2 INJECTION INTRAMUSCULAR; INTRAVENOUS at 21:02

## 2020-12-13 RX ADMIN — SODIUM CHLORIDE 1000 ML: 9 INJECTION, SOLUTION INTRAVENOUS at 23:22

## 2020-12-13 ASSESSMENT — PAIN SCALES - GENERAL: PAINLEVEL_OUTOF10: 3

## 2020-12-14 PROBLEM — Z51.5 HOSPICE CARE: Status: ACTIVE | Noted: 2020-12-14

## 2020-12-14 PROBLEM — K55.059 ACUTE INTESTINAL ISCHEMIA (HCC): Status: ACTIVE | Noted: 2020-12-14

## 2020-12-14 PROBLEM — E87.6 HYPOKALEMIA, GASTROINTESTINAL LOSSES: Status: RESOLVED | Noted: 2019-09-25 | Resolved: 2020-12-14

## 2020-12-14 PROCEDURE — 96361 HYDRATE IV INFUSION ADD-ON: CPT

## 2020-12-14 PROCEDURE — 1200000000 HC SEMI PRIVATE

## 2020-12-14 PROCEDURE — 6360000002 HC RX W HCPCS: Performed by: NURSE PRACTITIONER

## 2020-12-14 PROCEDURE — 2580000003 HC RX 258: Performed by: NURSE PRACTITIONER

## 2020-12-14 RX ORDER — GLYCOPYRROLATE 0.2 MG/ML
0.2 INJECTION INTRAMUSCULAR; INTRAVENOUS EVERY 4 HOURS PRN
Status: DISCONTINUED | OUTPATIENT
Start: 2020-12-14 | End: 2020-12-16 | Stop reason: HOSPADM

## 2020-12-14 RX ORDER — ONDANSETRON 2 MG/ML
4 INJECTION INTRAMUSCULAR; INTRAVENOUS EVERY 6 HOURS PRN
Status: CANCELLED | OUTPATIENT
Start: 2020-12-14

## 2020-12-14 RX ORDER — SODIUM CHLORIDE 0.9 % (FLUSH) 0.9 %
10 SYRINGE (ML) INJECTION EVERY 12 HOURS SCHEDULED
Status: DISCONTINUED | OUTPATIENT
Start: 2020-12-14 | End: 2020-12-16 | Stop reason: HOSPADM

## 2020-12-14 RX ORDER — ACETAMINOPHEN 650 MG/1
650 SUPPOSITORY RECTAL EVERY 4 HOURS PRN
Status: CANCELLED | OUTPATIENT
Start: 2020-12-14

## 2020-12-14 RX ORDER — SODIUM CHLORIDE 9 MG/ML
INJECTION, SOLUTION INTRAVENOUS CONTINUOUS
Status: CANCELLED | OUTPATIENT
Start: 2020-12-14

## 2020-12-14 RX ORDER — LOPERAMIDE HYDROCHLORIDE 2 MG/1
2 CAPSULE ORAL PRN
Status: DISCONTINUED | OUTPATIENT
Start: 2020-12-14 | End: 2020-12-16

## 2020-12-14 RX ORDER — SODIUM CHLORIDE 9 MG/ML
INJECTION, SOLUTION INTRAVENOUS CONTINUOUS
Status: DISCONTINUED | OUTPATIENT
Start: 2020-12-14 | End: 2020-12-16 | Stop reason: HOSPADM

## 2020-12-14 RX ORDER — SODIUM CHLORIDE 0.9 % (FLUSH) 0.9 %
10 SYRINGE (ML) INJECTION PRN
Status: DISCONTINUED | OUTPATIENT
Start: 2020-12-14 | End: 2020-12-16 | Stop reason: HOSPADM

## 2020-12-14 RX ORDER — ONDANSETRON 2 MG/ML
4 INJECTION INTRAMUSCULAR; INTRAVENOUS EVERY 6 HOURS PRN
Status: DISCONTINUED | OUTPATIENT
Start: 2020-12-14 | End: 2020-12-16 | Stop reason: HOSPADM

## 2020-12-14 RX ORDER — ACETAMINOPHEN 325 MG/1
650 TABLET ORAL EVERY 4 HOURS PRN
Status: DISCONTINUED | OUTPATIENT
Start: 2020-12-14 | End: 2020-12-16 | Stop reason: HOSPADM

## 2020-12-14 RX ORDER — LORAZEPAM 2 MG/ML
0.5 INJECTION INTRAMUSCULAR
Status: CANCELLED | OUTPATIENT
Start: 2020-12-14

## 2020-12-14 RX ORDER — ATROPINE SULFATE 10 MG/ML
2 SOLUTION/ DROPS OPHTHALMIC EVERY 4 HOURS PRN
Status: DISCONTINUED | OUTPATIENT
Start: 2020-12-14 | End: 2020-12-16 | Stop reason: HOSPADM

## 2020-12-14 RX ORDER — SODIUM CHLORIDE 0.9 % (FLUSH) 0.9 %
10 SYRINGE (ML) INJECTION PRN
Status: CANCELLED | OUTPATIENT
Start: 2020-12-14

## 2020-12-14 RX ORDER — SODIUM CHLORIDE 0.9 % (FLUSH) 0.9 %
10 SYRINGE (ML) INJECTION EVERY 12 HOURS SCHEDULED
Status: CANCELLED | OUTPATIENT
Start: 2020-12-14

## 2020-12-14 RX ORDER — GLYCOPYRROLATE 0.2 MG/ML
0.2 INJECTION INTRAMUSCULAR; INTRAVENOUS EVERY 4 HOURS PRN
Status: CANCELLED | OUTPATIENT
Start: 2020-12-14

## 2020-12-14 RX ADMIN — ONDANSETRON 4 MG: 2 INJECTION INTRAMUSCULAR; INTRAVENOUS at 16:07

## 2020-12-14 RX ADMIN — SODIUM CHLORIDE: 9 INJECTION, SOLUTION INTRAVENOUS at 16:08

## 2020-12-14 RX ADMIN — ONDANSETRON 4 MG: 2 INJECTION INTRAMUSCULAR; INTRAVENOUS at 22:08

## 2020-12-14 RX ADMIN — SODIUM CHLORIDE: 9 INJECTION, SOLUTION INTRAVENOUS at 03:45

## 2020-12-14 RX ADMIN — HYDROMORPHONE HYDROCHLORIDE 0.25 MG: 1 INJECTION, SOLUTION INTRAMUSCULAR; INTRAVENOUS; SUBCUTANEOUS at 12:15

## 2020-12-14 ASSESSMENT — PAIN DESCRIPTION - LOCATION: LOCATION: GENERALIZED;ABDOMEN

## 2020-12-14 ASSESSMENT — ENCOUNTER SYMPTOMS
EYES NEGATIVE: 1
DIARRHEA: 1
ABDOMINAL PAIN: 1
RESPIRATORY NEGATIVE: 1
NAUSEA: 1
SORE THROAT: 0
COUGH: 0

## 2020-12-14 ASSESSMENT — PAIN SCALES - GENERAL
PAINLEVEL_OUTOF10: 0
PAINLEVEL_OUTOF10: 2
PAINLEVEL_OUTOF10: 7
PAINLEVEL_OUTOF10: 0

## 2020-12-14 ASSESSMENT — PAIN DESCRIPTION - ORIENTATION: ORIENTATION: MID

## 2020-12-14 ASSESSMENT — PAIN DESCRIPTION - DESCRIPTORS: DESCRIPTORS: ACHING;DISCOMFORT

## 2020-12-14 ASSESSMENT — PAIN DESCRIPTION - PAIN TYPE: TYPE: ACUTE PAIN;CHRONIC PAIN

## 2020-12-14 ASSESSMENT — PAIN DESCRIPTION - ONSET: ONSET: ON-GOING

## 2020-12-14 NOTE — PROGRESS NOTES
Skin assessment performed by myself and Silvestre Lassiter. There is blanchable redness on the coccyx, a small bruise on her right hip and scattered petechiae. No open areas were noted.

## 2020-12-14 NOTE — PLAN OF CARE
Ongoing  12/14/2020 0319 by Yamilex Kirkpatrick RN  Outcome: Ongoing     Problem: Physical Regulation:  Goal: Complications related to the disease process, condition or treatment will be avoided or minimized  Description: Complications related to the disease process, condition or treatment will be avoided or minimized  12/14/2020 1026 by Imtiaz Araya RN  Outcome: Ongoing  12/14/2020 0319 by Yamilex Kirkpatrick RN  Outcome: Ongoing     Problem: Sensory:  Goal: Expressions of feelings of enhanced comfort will increase  Description: Expressions of feelings of enhanced comfort will increase  12/14/2020 1026 by Imtiaz Araya RN  Outcome: Ongoing  12/14/2020 0319 by Yamilex Kirkpatrick RN  Outcome: Ongoing

## 2020-12-14 NOTE — ED PROVIDER NOTES
The history is provided by the patient and the EMS personnel. Nausea & Vomiting  Severity:  Moderate  Duration:  2 days  Timing:  Constant  Number of daily episodes:  2  Quality:  Stomach contents  Able to tolerate:  Liquids  Progression:  Worsening  Chronicity:  Recurrent  Recent urination:  Decreased  Relieved by:  Nothing  Worsened by:  Nothing  Ineffective treatments:  None tried  Associated symptoms: abdominal pain, chills and diarrhea    Associated symptoms: no arthralgias, no cough, no fever, no headaches, no myalgias, no sore throat and no URI    Abdominal pain:     Location:  Generalized    Quality: bloating and cramping    Diarrhea:     Quality:  Watery    Number of occurrences: Too numerous to count, she has been filling her colostomy all the time for last few days    Severity:  Severe  Risk factors comment:  Colon cancer, metastatic colon cancer, chemo patient      Review of Systems   Constitutional: Positive for chills. Negative for fever. HENT: Negative. Negative for sore throat. Eyes: Negative. Respiratory: Negative. Negative for cough. Cardiovascular: Negative. Gastrointestinal: Positive for abdominal pain, diarrhea and nausea. Genitourinary: Negative. Musculoskeletal: Negative. Negative for arthralgias and myalgias. Skin: Negative. Neurological: Negative. Negative for headaches. All other systems reviewed and are negative.       Family History   Problem Relation Age of Onset    Arthritis Mother     Depression Mother     Hearing Loss Mother     Cancer Father         colon cancer    Heart Disease Brother         Heart Surgery    Migraines Son      Social History     Socioeconomic History    Marital status:      Spouse name: Not on file    Number of children: Not on file    Years of education: Not on file    Highest education level: Not on file   Occupational History    Not on file   Social Needs    Financial resource strain: Not on file   Wing-Alfonso insecurity     Worry: Not on file     Inability: Not on file    Transportation needs     Medical: Not on file     Non-medical: Not on file   Tobacco Use    Smoking status: Never Smoker    Smokeless tobacco: Never Used    Tobacco comment: Reviewed   Substance and Sexual Activity    Alcohol use: No    Drug use: No    Sexual activity: Yes     Partners: Male     Comment:    Lifestyle    Physical activity     Days per week: Not on file     Minutes per session: Not on file    Stress: Not on file   Relationships    Social connections     Talks on phone: Not on file     Gets together: Not on file     Attends Confucianism service: Not on file     Active member of club or organization: Not on file     Attends meetings of clubs or organizations: Not on file     Relationship status: Not on file    Intimate partner violence     Fear of current or ex partner: Not on file     Emotionally abused: Not on file     Physically abused: Not on file     Forced sexual activity: Not on file   Other Topics Concern    Not on file   Social History Narrative            Never use alcohol        Never use tobacco        Never use drugs        Caffeine 2 cups of coffee per day 1 soda every day Iced tea        Retired [employer]              Past Surgical History:   Procedure Laterality Date    ABDOMEN SURGERY      BREAST LUMPECTOMY Right 1980's    Benign    CARDIAC VALVE REPLACEMENT  7/30/14    AVR (Medtronic tissue valve; Dr. Jasmeet Bennett) w/CABG X1    COLONOSCOPY N/A 11/13/2019    COLONOSCOPY DIAGNOSTIC/STOMA performed by Omer Barragan MD at [place] [id number]  7/30/14    CABG x1 (LIMA to LAD) w/AVR     DENTAL SURGERY      Teeth Extracted In Past    DIAGNOSTIC CARDIAC CATH LAB PROCEDURE  7/28/14    Critical aortic stenosis, 70-80% proximal LAD lesion w/ulcerated plaque, aortic root dilatation, LVgram not done.     HYSTERECTOMY, TOTAL ABDOMINAL  1980's    \"not sure if they  Heart murmur     [place]ski (hard of hearing)     Bilateral Ears    Hyperlipidemia     Hypertension     \"use to be on b/p medication and took it untill 10/2017 and they never put me back on it\"    MR (congenital mitral regurgitation)     Multiple lung nodules on CT 3/7/2019    Nocturnal hypoxemia 11/3/2017    Pleural effusion     per old chart pt dx with large pleural effusion and had thoracentesis and chest tube placement done 10/22/2017 and then on 10/26/2017 had thoracentesis and removal of chest tube - also had pt had intrapleural installation of tPA 10/24/2017    Pneumonia Dx 1990's    Prolonged emergence from general anesthesia     \"alittle trouble waking me up\"    S/P CABG x 1 8/12/2014 7/30/14 [facility]- LAD Dr Champion Spillers Teeth missing     Lower    Wears glasses      Allergies   Allergen Reactions    Prilosec [Omeprazole]      \"Got Real Lightheaded\"    Pravachol [Pravastatin Sodium]      \"I Don't Remember The Reaction\"     Prior to Admission medications    Medication Sig Start Date End Date Taking? Authorizing Provider   DULoxetine (CYMBALTA) 60 MG extended release capsule Take 1 capsule by mouth daily 12/1/20   Cj Shea MD   Magic Mouthwash (MIRACLE MOUTHWASH) Swish and spit 5 mLs 4 times daily as needed for Irritation 10/29/20   EDWIN Fonseca - CNP   ondansetron (ZOFRAN) 8 MG tablet TAKE 1 TABLET BY MOUTH EVERY 8 HOURS AS NEEDED FOR NAUSEA AND VOMITING 10/20/20   Forest Will MD   dexamethasone (DECADRON) 4 MG tablet Take 1 tablet by mouth daily Take 1 tablet by mouth daily x 4 days after each chemotherapy.  10/20/20   Cj Shea MD   lisinopril (PRINIVIL;ZESTRIL) 20 MG tablet Take 1 tablet by mouth daily 10/20/20   Cj Shea MD   ferrous sulfate 325 (65 Fe) MG tablet Take 1 tablet by mouth daily (with breakfast) 2/27/20   Sveta Castillo MD   aspirin 81 MG tablet Take 1 tablet by mouth daily 2/26/20   Sveta Castillo MD   Potassium 99 MG TABS Take 1 tablet by mouth daily Historical Provider, MD   bismuth subsalicylate (PEPTO BISMOL) 262 MG/15ML suspension Take 15 mLs by mouth every 6 hours as needed for Indigestion    Historical Provider, MD   Apoaequorin (PREVAGEN PO) Take 1 each by mouth daily     Historical Provider, MD   Ostomy Supplies (OSTOMY DRAINABLE POUCH/FLANGE) MISC 1 device replaced twice weekly prn 5/16/19   Carleen Nyhan, MD   Multiple Vitamins-Minerals (MULTIVITAMIN PO) Take 1 each by mouth daily     Historical Provider, MD   simvastatin (ZOCOR) 40 MG tablet Take 40 mg by mouth nightly. Historical Provider, MD   Calcium Carbonate-Vitamin D (CALCIUM + D) 600-200 MG-UNIT TABS Take 1 tablet by mouth every morning. Over The Counter    Historical Provider, MD       /78   Pulse 98   Temp 98.5 °F (36.9 °C) (Oral)   Resp 20   Ht 5' 4\" (1.626 m)   Wt 130 lb (59 kg)   SpO2 98%   BMI 22.31 kg/m²     Physical Exam  Vitals signs and nursing note reviewed. Constitutional:       Appearance: She is ill-appearing, toxic-appearing and diaphoretic. Eyes:      Pupils: Pupils are equal, round, and reactive to light. Cardiovascular:      Rate and Rhythm: Tachycardia present. Pulmonary:      Effort: Pulmonary effort is normal. No respiratory distress. Abdominal:      General: There is distension. Tenderness: There is abdominal tenderness. Comments: Colostomy noted and pink with watery green stool present. Diffuse non focal tenderness   Skin:     Capillary Refill: Capillary refill takes 2 to 3 seconds. Coloration: Skin is pale. Neurological:      General: No focal deficit present. Mental Status: Mental status is at baseline.          MDM:    Results for orders placed or performed during the hospital encounter of 12/13/20   CBC Auto Differential   Result Value Ref Range    WBC 2.0 (L) 4.0 - 10.5 K/CU MM    RBC 3.54 (L) 4.2 - 5.4 M/CU MM    Hemoglobin 9.5 (L) 12.5 - 16.0 GM/DL    Hematocrit 31.4 (L) 37 - 47 %    MCV 88.7 78 - 100 FL    MCH 26.8 (L) 27 - 31 PG    MCHC 30.3 (L) 32.0 - 36.0 %    RDW 16.8 (H) 11.7 - 14.9 %    Platelets 885 (L) 320 - 440 K/CU MM    MPV 12.3 (H) 7.5 - 11.1 FL    Differential Type AUTOMATED DIFFERENTIAL     Segs Relative 34.5 (L) 36 - 66 %    Lymphocytes % 24.5 24 - 44 %    Monocytes % 39.5 (H) 0 - 4 %    Eosinophils % 0.0 0 - 3 %    Basophils % 0.5 0 - 1 %    Segs Absolute 0.7 K/CU MM    Lymphocytes Absolute 0.5 K/CU MM    Monocytes Absolute 0.8 K/CU MM    Eosinophils Absolute 0.0 K/CU MM    Basophils Absolute 0.0 K/CU MM    Immature Neutrophil % 1.0 (H) 0 - 0.43 %    Total Immature Neutrophil 0.02 K/CU MM   Comprehensive Metabolic Panel   Result Value Ref Range    Sodium 138 135 - 145 MMOL/L    Potassium 3.5 3.5 - 5.1 MMOL/L    Chloride 97 (L) 99 - 110 mMol/L    CO2 28 21 - 32 MMOL/L    BUN 38 (H) 6 - 23 MG/DL    CREATININE 1.3 (H) 0.6 - 1.1 MG/DL    Glucose 192 (H) 70 - 99 MG/DL    Calcium 9.1 8.3 - 10.6 MG/DL    Alb 2.9 (L) 3.4 - 5.0 GM/DL    Total Protein 5.8 (L) 6.4 - 8.2 GM/DL    Total Bilirubin 1.6 (H) 0.0 - 1.0 MG/DL    ALT 27 10 - 40 U/L    AST 50 (H) 15 - 37 IU/L    Alkaline Phosphatase 145 (H) 40 - 129 IU/L    GFR Non- 40 (L) >60 mL/min/1.73m2    GFR  48 (L) >60 mL/min/1.73m2    Anion Gap 13 4 - 16   Lipase   Result Value Ref Range    Lipase 5 (L) 13 - 60 IU/L   Urinalysis (Lab)   Result Value Ref Range    Color, UA YELLOW YELLOW    Clarity, UA CLEAR CLEAR    Glucose, Urine NEGATIVE NEGATIVE MG/DL    Bilirubin Urine NEGATIVE NEGATIVE MG/DL    Ketones, Urine NEGATIVE NEGATIVE MG/DL    Specific Gravity, UA 1.020 1.001 - 1.035    Blood, Urine NEGATIVE NEGATIVE    pH, Urine 6.0 5.0 - 8.0    Protein, UA NEGATIVE NEGATIVE MG/DL    Urobilinogen, Urine 0.2 0.2 - 1.0 MG/DL    Nitrite Urine, Quantitative NEGATIVE NEGATIVE    Leukocyte Esterase, Urine NEGATIVE NEGATIVE    RBC, UA NO CELLS SEEN 0 - 6 /HPF    WBC, UA NO CELLS SEEN 0 - 5 /HPF    Epithelial Cells, UA NEGATIVE /HPF    Cast Type NO CAST FORMS SEEN NO CAST FORMS SEEN /HPF    Bacteria, UA NEGATIVE NEGATIVE /HPF    Crystal Type NEGATIVE NEGATIVE /HPF   Lactic Acid, Plasma   Result Value Ref Range    Lactate 6.7 (HH) 0.4 - 2.0 mMOL/L     CT ABDOMEN PELVIS WO CONTRAST   Final Result   1. Multiple dilated small bowel loops with diffuse wall thickening and   pneumatosis, consistent with bowel ischemia. 2. Ascites and pneumoperitoneum with extensive portal venous gas. 3. Hepatic metastases are less conspicuous than on prior contrasted   examination. 4. Noncalcified pulmonary nodules, consistent with metastases. These appear   increased in size in the interval.   Critical results were called by Dr. Ml Gardner MD to Dylan Davis 18 on   12/13/2020 at 22:44. My typical dicussion, presentation,and considerations for this patients' chief complaint, diagnosis, differential diagnosis, medications, medication use,  medication safety and medication interactions have been explained and outlined to this patient for thispatient encounter. I have discussed case with Dr. Glenn Fagan and this is not a surgical case due to extensive metastatic disease. Justine the hospitalist has been contacted and accepted the patient. I have also discussed with Dr. Tab Russell. In addition, the family has been contacted and told that this is a grim prognosis. The granddaughter Jacquie Durand is the POC and we were given permission by the patient to discuss her case with this individual. I had lengthy discussion about how this is palliative care issue at this point and the surgeon has examined the CT. I have given the patient 30 ml/kg fluid bolus and I have given the patient zosyn. She is maintaining her blood pressure at 646 systolic and her heart rate at 98. She has become more confused and stating she is cold since being in the ER which I believe is secondary to sepsis causing vascular changes.  The family has been notified and they are aware she is not a surgical candidate but I

## 2020-12-14 NOTE — DISCHARGE SUMMARY
6 hours as needed for Indigestion             Calcium Carbonate-Vitamin D (CALCIUM + D) 600-200 MG-UNIT TABS  Take 1 tablet by mouth every morning. Over The Counter             dexamethasone (DECADRON) 4 MG tablet  Take 1 tablet by mouth daily Take 1 tablet by mouth daily x 4 days after each chemotherapy. DULoxetine (CYMBALTA) 60 MG extended release capsule  Take 1 capsule by mouth daily             ferrous sulfate 325 (65 Fe) MG tablet  Take 1 tablet by mouth daily (with breakfast)             lisinopril (PRINIVIL;ZESTRIL) 20 MG tablet  Take 1 tablet by mouth daily             Magic Mouthwash (MIRACLE MOUTHWASH)  Swish and spit 5 mLs 4 times daily as needed for Irritation             Multiple Vitamins-Minerals (MULTIVITAMIN PO)  Take 1 each by mouth daily              ondansetron (ZOFRAN) 8 MG tablet  TAKE 1 TABLET BY MOUTH EVERY 8 HOURS AS NEEDED FOR NAUSEA AND VOMITING             Ostomy Supplies (OSTOMY DRAINABLE POUCH/FLANGE) MISC  1 device replaced twice weekly prn             Potassium 99 MG TABS  Take 1 tablet by mouth daily             simvastatin (ZOCOR) 40 MG tablet  Take 40 mg by mouth nightly. Objective Findings at Discharge:   BP (!) 99/52   Pulse 85   Temp 97 °F (36.1 °C) (Oral)   Resp 18   Ht 5' 4\" (1.626 m)   Wt 126 lb 14.4 oz (57.6 kg)   SpO2 98%   BMI 21.78 kg/m²            PHYSICAL EXAM   GEN Awake toxic appearing white female, sitting upright in bed. Appears given age. EYES Pupils are equally round. No scleral erythema, discharge, or conjunctivitis. HENT Mucous membranes are dry. NECK No apparent thyromegaly or masses. Trachea midline  RESP diminished breath sounds throughout, no wheezes, rales or rhonchi. Symmetric shallow chest movement while on room air. CARDIO/VASC S1/S2 auscultated. Regular rate without appreciable murmurs, rubs, or gallops. Peripheral pulses equal bilaterally and palpable. No peripheral edema.   GI Abdomen is distended and tender to palpation, masses, or guarding. Bowel sounds are hypoactive. Rectal exam deferred.  Gambino catheter is not present. HEME/LYMPH No petechiae or ecchymoses. MSK No gross joint deformities. Spontaneous movement of all extremities  SKIN pale and cool. NEURO Cranial nerves appear grossly intact, normal speech, no lateralizing weakness. PSYCH Awake, alert, oriented x 3. Affect appropriate. BMP/CBC  Recent Labs     12/13/20  2045 12/13/20  2100   NA  --  138   K  --  3.5   CL  --  97*   CO2  --  28   BUN  --  38*   CREATININE  --  1.3*   WBC 2.0*  --    HCT 31.4*  --    *  --        IMAGING: CT of the abdomen pelvis without contrast on intake:  FINDINGS:   Lower Chest: Multiple noncalcified pulmonary nodules in both lung bases.       Organs: Multiple hepatic lesions seen on prior examination are not as well   visualized on this noncontrast study.  Within the limitations of a   noncontrast examination, no acute abnormality within the spleen, pancreas, or   kidneys.  Bilateral adrenal nodules are similar to prior study given   difference in technique.       GI/Bowel: Stomach is partially distended.  There are multiple dilated loops   of small bowel with diffuse wall thickening and pneumatosis. Jessica Collar has been   prior partial colectomy. Jessica Collar is a left lower quadrant ostomy.  Peristomal   hernia contains mesenteric fat.       Pelvis: Bladder is partially distended without vesicular stone.  Prior   hysterectomy.       Peritoneum/Retroperitoneum: There is small ascites.  There is diffuse   mesenteric stranding and edema.  Small pneumoperitoneum.  There is extensive   portal venous gas.  Enlarged inguinal, retroperitoneal, and mesenteric lymph   nodes.       Bones/Soft Tissues: Multilevel degenerative changes of the lumbar spine.           Impression   1. Multiple dilated small bowel loops with diffuse wall thickening and   pneumatosis, consistent with bowel ischemia.    2. Ascites and pneumoperitoneum with extensive portal venous gas. 3. Hepatic metastases are less conspicuous than on prior contrasted   examination.    4. Noncalcified pulmonary nodules, consistent with metastases.  These appear   increased in size in the interval.         Discharge Time of 20 minutes    Electronically signed by Kobi Brock MD on 12/14/2020 at 9:55 AM

## 2020-12-14 NOTE — PROGRESS NOTES
Referral called to [facility] at 1250, spoke with Kelsi Garland. Someone should update us and/or pt's family sometime today.

## 2020-12-14 NOTE — CARE COORDINATION
CM notified by [facility] RN that Dr. Julianna Mariscal instructed her to notify this CM that the patient is appropriate for inpatient hospice.

## 2020-12-14 NOTE — ED NOTES
Patient states colostomy bag has been constantly filled and states that the contents were more light brown water. Emesis.       Roosevelt Roy RN  12/13/20 2043       Roosevelt Roy RN  12/13/20 2044

## 2020-12-14 NOTE — ED NOTES
Colostomy bad was saturated and leaking. New bag placed. Some red brown liquid present from stoma site.       Dee Fontenot RN  12/13/20 [id number]

## 2020-12-14 NOTE — ED NOTES
Dr. Ja Vyas is patients oncologist , currently being treated for colon ca. Patient receives Chemo every 2 weeks. Last treatment was last Wednesday. Patient has port that is not accessed.  EMS placed a 20g IV in right [address], RN  12/13/20 2041

## 2020-12-14 NOTE — PROGRESS NOTES
Received a call from the on call Hospice physician, Dr Bruna Tejada. Dr Bruna Tejada very upset that a consult was called \"at this ungodly\" hour and suggest that Dr Brie Shelley be notified later in the day at a \"more appropriate time\".

## 2020-12-14 NOTE — ED NOTES
Family updated on pending transfer to [facility].   will be Omar Priest who is the patient's emir Maya RN  12/13/20 0033

## 2020-12-14 NOTE — PLAN OF CARE
Problem: Falls - Risk of:  Goal: Will remain free from falls  Description: Will remain free from falls  Outcome: Ongoing  Goal: Absence of physical injury  Description: Absence of physical injury  Outcome: Ongoing     Problem: Skin Integrity:  Goal: Will show no infection signs and symptoms  Description: Will show no infection signs and symptoms  Outcome: Ongoing  Goal: Absence of new skin breakdown  Description: Absence of new skin breakdown  Outcome: Ongoing  Goal: Risk for impaired skin integrity will decrease  Description: Risk for impaired skin integrity will decrease  Outcome: Ongoing     Problem: Coping:  Goal: Ability to participate in care decisions during the dying process will improve  Description: Ability to participate in care decisions during the dying process will improve  Outcome: Ongoing  Goal: Family's ability to cope with current situation will improve  Description: Family's ability to cope with current situation will improve  Outcome: Ongoing     Problem: Health Behavior:  Goal: Identification of resources available to assist in meeting health care needs will improve  Description: Identification of resources available to assist in meeting health care needs will improve  Outcome: Ongoing     Problem: Physical Regulation:  Goal: Complications related to the disease process, condition or treatment will be avoided or minimized  Description: Complications related to the disease process, condition or treatment will be avoided or minimized  Outcome: Ongoing     Problem: Sensory:  Goal: Expressions of feelings of enhanced comfort will increase  Description: Expressions of feelings of enhanced comfort will increase  Outcome: Ongoing

## 2020-12-14 NOTE — CONSULTS
[address] [place] [address] Consultation Note    Date: 12/14/2020  Name: Yanelis Son  MRN: [id number]  YOB: 1945   Patient's PCP: Elihue Severance, MD   Oncology: Dr. Cruz Cortes  Referring Physician: Michelle Chacon CNP  Acute care admit date: 12/13/2020 at [facility]    Informant: Chart reviewed, discussed with the patient's nurse, and I met with the patient and her son, Jaime Gomes, and daughter, Margi Ramos, at the bedside. The patient awakens, and seems mildly confused. CC:  Abdominal pain    [place]: This is a 77year old patient with a history of colon cancer diagnosed in October 2017, metastatic to lung (in April 2019) and liver on chemotherapy with prior colon resection with colostomy by Dr Trupti Srivastava, and other history of hypertension, aortic stenosis with valve replacement, GERD, who was admitted on 12/13/2020 with severe abdominal pain, nausea and vomiting. The patient had recent disease progression by CT imaging in October 2020 and her most recent chemotherapy was 12/1/20 (see Oncology summary below). In the ED, lactate was elevated at 6.7 mMol/liter, white count was 2,000 with monocytosis, serum albumin is 2.9 gm/dl. CT-scan of abdomen and pelvis showed pneumoperitoneum with extensive portal venous gas, dilated small bowel loops with wall thickening, and was felt to be consistent with ischemic colitis. Dr Trupti Srivastava was contacted, and did not feel the the patient would benefit from surgery, and the patient was admitted to the hospitalist service at [facility], and hospice consulted. The patient is currently comfortable unless her abdomen is examined. She does have liquid ostomy output. Hospice philosophy was discussed with the patient and 2 children regarding care and comfort at the end of life. I am not sure how much the patient comprehended due to her clinical status. Questions were answered, and emotional support was provided.  They are aware that General Inpatient Hospice is for the acute management of symptoms, and if the patient stabilizes, alternative arrangements for home Hospice or extended care facility will be necessary. The patient is DNR-comfort care status. Oncology history per Dr Johana Moyer note 12/1/2020:  Ms. 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       She initially presented to [facility] on October 7, 2017, with nausea and vomiting. She had a CT angiogram of the abdomen on admission, October 7, 2017, and it showed irregular, asymmetric wall thickening involving a 4 cm segment of sigmoid colon with somewhat nodular infiltration of adjacent fat. Dilatation proximally which is most prominent involving the colon at the hepatic flexure where it measures up to 8.9 cm. Findings are concerning for obstructing sigmoid neoplasm. She also has a mildly enlarged lymph node anterior to the distal iliac vasculature. She was subsequently evaluated by Dr. Levar Mckeon and she underwent exploratory laparotomy, sigmoid colon resection with end colostomy, bladder dome resection and closure, segment of small bowel resection, on October 7, 2017.       Final pathology revealed low-grade invasive adenocarcinoma. Chronic inflammation and fibrosis with focal abscess associated with invasive adenocarcinoma. Small segment of small intestine revealed mild chronic nonspecific mucosal enteritis. Tumor size was 4.3 x 4.5 cm in diameter and all the surgical margins were negative. Lymphovascular invasion and perineural invasion were present. Eight out of 11 lymph nodes examined were positive for metastasis (pT3 pN2b pMx).        Her CEA level on October 8, 2017, was 2020 since she has progression of the disease.       Second line chemotherapy with FOLFIRI and panitumumab was started on 7/15/20. We stopped it after 9/29/20 therapy since she is found to have progression of the disease. She couldn't handle well on FOLFIRI and panitumumab.      CT scan on 10/5/20 showed overall significant progression of metastatic disease.      Decision was made to rechallenge with FOLFOX along with avastin since she never receives avastin before. Will plan to start it on 10/13/20.     On December 1, 2020, she presented to me for follow up. I have been following Ms. Lc Quick for stage IIIC low-grade invasive sigmoid colon adenocarcinoma and she is status post surgery followed by adjuvant chemotherapy with FOLFOX.      CT scan on 4/10/19 showed increase in size and numbers of bilateral pulmonary nodules. CEA done the same day also showed increasing (52.9).    Subsequently underwent CT guided biopsy of lung nodules on 4/24/19 and final pathology showed metastatic adenocarcinoma consistent with colorectal primary.     All MELISSA study, BRAF were negative and MSI studies showed she has MSI stable disease. First line chemotherapy with FOLFOX and panitumumab was started on 5/22/19 and we stopped it on July 1, 2020 since she is found to have progression of the disease.      Second chemotherapy with FOLFIRI and Panitumumab was started on July 15, 2020. We stopped it after 9/29/20 therapy since she is found to have progression of the disease. She couldn't handle well on FOLFIRI and panitumumab.      CT scan on 10/5/20 showed overall significant progression of metastatic disease.      Decision was made to rechallenge with FOLFOX along with avastin since she never receives avastin before. We started it on 10/20/20. She is tolerating FOLFOX and avastin better. She doesn't encounter any major side effects from it.  I recommend her to continue with it for now and I will schedule for her next therapy.      I will continue to monitor her CEA closely and will consider to have repeat scans in 3 to 4 months. If she fails to respond to FOLFOX and avastin, will consider using either lonsurf or stivarga.      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 I will continue to monitor her neuropathy closely.       Besides that, she does not have any other significant symptoms at today's visit. Past Medical History:   Diagnosis Date    Abnormal CT scan     \"4/10/2019\"found spot on the lung and liver\"     Abnormal finding on EKG     Acid reflux     Aortic stenosis     Aortic valve prosthesis present 8/12/2014 7/30/14 Dr Christy Cagle #23 Medtronic tissue valve     AR (aortic regurgitation)     Arthritis     \"Hands Mostly\"    CAD (coronary artery disease)     S/P CABG x1 7/2014-follows with Dr Bree Gee [facility])     colon\"dx in 10/2017- tx with surg and chemo- following with Dr Ml Sagastume cancer metastasized to lung [facility]) 3/12/2020    [address] care [address] [address]     COPD, mild ([address] 9/6/2018    Diabetes mellitus ([facility] [address]     \"at one time was borderline- but never on medication- and no one said anything about me having this at present\"    Family history of coronary artery disease     Fatigue     H/O 24 hour EKG monitoring 05/03/2018    48-hour Holter monitor showed normal sinus rhythm average rate is 72 bpm lowest rate of 52 occurred at 2:20 AM fastest rate of 104 occurred at 6 PM.  No significant ventricular or supraventricular ectopy is noted.   Patient submitted a diary did not report any activities or symptoms during the monitoring    H/O cardiovascular stress test 2/7/12 2/12- Normal study-EF 70%    H/O echocardiogram 5/29/19; 05/22/2018    LV function and size normal w/mild asymmetric LV hypertrophy of septal wall, no regional wall motion abnormalities, diastolic dysfunction Grade I, all chamber dimensions WNL, possible stenosis of the bioprosthetic aortic valve w/mean pressure of 23 mmHg, mild TR, RVSP= 26 mmHg, EF 55-60%. Compared to previous echo, prosthetic AOV mean pressure shows slight decrease from 25 to 23 mmHg.  Heart murmur     [place] (hard of hearing)     Bilateral Ears    Hyperlipidemia     Hypertension     \"use to be on b/p medication and took it untill 10/2017 and they never put me back on it\"    MR (congenital mitral regurgitation)     Multiple lung nodules on CT 3/7/2019    Nocturnal hypoxemia 11/3/2017    Pleural effusion     per old chart pt dx with large pleural effusion and had thoracentesis and chest tube placement done 10/22/2017 and then on 10/26/2017 had thoracentesis and removal of chest tube - also had pt had intrapleural installation of tPA 10/24/2017    Pneumonia Dx 1990's    Prolonged emergence from general anesthesia     \"alittle trouble waking me up\"    S/P CABG x 1 8/12/2014 7/30/14 [facility]- LAD Dr Marina Gilbert Teeth missing     Lower    Wears glasses        Past Surgical History:   Procedure Laterality Date    ABDOMEN SURGERY      BREAST LUMPECTOMY Right 1980's    Benign    CARDIAC VALVE REPLACEMENT  7/30/14    AVR (Medtronic tissue valve; Dr. Rosalio Casey) w/CABG X1    COLONOSCOPY N/A 11/13/2019    COLONOSCOPY DIAGNOSTIC/STOMA performed by Julia Hernandez MD at [place] [id number]  7/30/14    CABG x1 (LIMA to LAD) w/AVR     DENTAL SURGERY      Teeth Extracted In Past    DIAGNOSTIC CARDIAC CATH LAB PROCEDURE  7/28/14    Critical aortic stenosis, 70-80% proximal LAD lesion w/ulcerated plaque, aortic root dilatation, LVgram not done.     HYSTERECTOMY, TOTAL ABDOMINAL  1980's    \"not sure if they took the ovaries\"    OTHER SURGICAL HISTORY  10/07/2017    exp lap, small bowel resection, sigmoid colon resection, partial bladder repair, creation of colostomy    TUNNELED VENOUS PORT PLACEMENT  1025/2017    per old chart pt had port insertion with admission 10/2017       Social History Socioeconomic History    Marital status:      Spouse name: Not on file    Number of children: Not on file    Years of education: Not on file    Highest education level: Not on file   Occupational History    Not on file   Social Needs    Financial resource strain: Not on file    Food insecurity     Worry: Not on file     Inability: Not on file    Transportation needs     Medical: Not on file     Non-medical: Not on file   Tobacco Use    Smoking status: Never Smoker    Smokeless tobacco: Never Used    Tobacco comment: Reviewed   Substance and Sexual Activity    Alcohol use: No    Drug use: No    Sexual activity: Yes     Partners: Male     Comment:    Lifestyle    Physical activity     Days per week: Not on file     Minutes per session: Not on file    Stress: Not on file   Relationships    Social connections     Talks on phone: Not on file     Gets together: Not on file     Attends Holiness service: Not on file     Active member of club or organization: Not on file     Attends meetings of clubs or organizations: Not on file     Relationship status: Not on file    Intimate partner violence     Fear of current or ex partner: Not on file     Emotionally abused: Not on file     Physically abused: Not on file     Forced sexual activity: Not on file   Other Topics Concern    Not on file   Social History Narrative            Never use alcohol        Never use tobacco        Never use drugs        Caffeine 2 cups of coffee per day 1 soda every day Iced tea        Retired [employer]                Family History   Problem Relation Age of Onset    Arthritis Mother     Depression Mother     Hearing Loss Mother     Cancer Father         colon cancer    Heart Disease Brother         Heart Surgery    Migraines Son        Allergies   Allergen Reactions    Prilosec [Omeprazole]      \"Got Real Lightheaded\"    Pravachol [Pravastatin Sodium]      \"I Don't Remember The Reaction\"       Medications reviewed  Prior to Admission medications    Medication Sig Start Date End Date Taking? Authorizing Provider   DULoxetine (CYMBALTA) 60 MG extended release capsule Take 1 capsule by mouth daily 12/1/20   Michelle Elizondo MD   Magic Mouthwash (MIRACLE MOUTHWASH) Swish and spit 5 mLs 4 times daily as needed for Irritation 10/29/20   EDWIN Hassan - CNP   ondansetron (ZOFRAN) 8 MG tablet TAKE 1 TABLET BY MOUTH EVERY 8 HOURS AS NEEDED FOR NAUSEA AND VOMITING 10/20/20   Forest Troy MD   dexamethasone (DECADRON) 4 MG tablet Take 1 tablet by mouth daily Take 1 tablet by mouth daily x 4 days after each chemotherapy. 10/20/20   Michelle Elizondo MD   lisinopril (PRINIVIL;ZESTRIL) 20 MG tablet Take 1 tablet by mouth daily 10/20/20   Michelle Elizondo MD   ferrous sulfate 325 (65 Fe) MG tablet Take 1 tablet by mouth daily (with breakfast) 2/27/20   Jeremy Dey MD   aspirin 81 MG tablet Take 1 tablet by mouth daily 2/26/20   Jeremy Dey MD   Potassium 99 MG TABS Take 1 tablet by mouth daily    Historical Provider, MD   bismuth subsalicylate (PEPTO BISMOL) 262 MG/15ML suspension Take 15 mLs by mouth every 6 hours as needed for Indigestion    Historical Provider, MD   Apoaequorin (PREVAGEN PO) Take 1 each by mouth daily     Historical Provider, MD   Ostomy Supplies (OSTOMY DRAINABLE POUCH/FLANGE) MISC 1 device replaced twice weekly prn 5/16/19   Marisol Cedillo MD   Multiple Vitamins-Minerals (MULTIVITAMIN PO) Take 1 each by mouth daily     Historical Provider, MD   simvastatin (ZOCOR) 40 MG tablet Take 40 mg by mouth nightly. Historical Provider, MD   Calcium Carbonate-Vitamin D (CALCIUM + D) 600-200 MG-UNIT TABS Take 1 tablet by mouth every morning.  Over The Counter    Historical Provider, MD       ROS: As noted in [address], all other systems are limited due to the patient's clinical condition, but reviewed as able from the chart, family and are negative or as follows:  Constitutional: No fever, chills, + weight loss  HEENT:  No headache, nasal drainage,   CV:  No chest pain, palpitations  PULM:  No cough, shortness of breath, hemoptysis  GI:  see above  :  No dysuria, hematuria  Musculoskeletal:  No edema,   Neuro: No seizures, syncope, + weakness  Skin:  No rash    Weight:    Wt Readings from Last 3 Encounters:   12/14/20 126 lb 14.4 oz (57.6 kg)   12/01/20 130 lb 12.8 oz (59.3 kg)   12/01/20 130 lb (59 kg)       Data reviewed 12/14/2020:  CT-scan of abdomen and pelvis 12/13/20:  1. Multiple dilated small bowel loops with diffuse wall thickening and   pneumatosis, consistent with bowel ischemia. 2. Ascites and pneumoperitoneum with extensive portal venous gas. 3. Hepatic metastases are less conspicuous than on prior contrasted   examination. 4. Noncalcified pulmonary nodules, consistent with metastases.   These appear   increased in size in the interval.     Final Pathologic Diagnosis 4/24/2019:    Lung, needle biopsy:   -  METASTATIC ADENOCARCINOMA CONSISTENT WITH A COLORECTAL PRIMARY       CEA 11/2/20: 146.8 ==> 114.6 on 11/16/20    Lactate 6.7  Recent Labs     12/13/20  2100   AST 50*   ALT 27   LIPASE 5*   BILITOT 1.6*   ALKPHOS 145*     Lab Results   Component Value Date    ALKPHOS 145 12/13/2020    ALT 27 12/13/2020    AST 50 12/13/2020    PROT 5.8 12/13/2020    PROT 7.5 12/10/2015    BILITOT 1.6 12/13/2020    BILIDIR 0.3 12/10/2015    LABALBU 2.9 12/13/2020     CBC:   Recent Labs     12/13/20 2045   WBC 2.0*   HGB 9.5*   HCT 31.4*   MCV 88.7   *     BMP:   Recent Labs     12/13/20  2100      K 3.5   CL 97*   CO2 28   BUN 38*   CREATININE 1.3*   GLUCOSE 192*     Physical Exam:   BP (!) 91/41   Pulse 100   Temp 97.2 °F (36.2 °C) (Infrared)   Resp 18   Ht 5' 4\" (1.626 m)   Wt 126 lb 14.4 oz (57.6 kg)   SpO2 99%   BMI 21.78 kg/m²   General: lethargic but arousable, slow to respond, mild pallor, grimaces with abdominal exam  HEENT: Mucous membranes are dry, sclerae are clear, pupils are equal  Chest: Mediport on right   Heart: tachycardic RRR, S1S2, no murmurs  Lungs:  equal breath sounds bilaterally, diminished at the bases, without rales, scattered rhonchi   Abdomen: soft, bowel sounds quiet, ostomy present with dark liquid output, there is diffuse L>>R abdominal tenderness with involuntary guarding, + distended  Extremities:  No mottling, toes are cool,   Neurologic: lethargic, generally weak, slow to respond    Assessment/Plan:  1. Colon cancer diagnosed October 2017 with hepatic and pulmonary metastases April 2019 and recent disease progression, presenting with pneumoperitoneum and ischemic colitis. The patient is not felt to be a surgical candidate. Hospice information is provided, and the hospice nurse liaison will also follow up. The patient is General Inpatient Hospice appropriate for the acute management of pain and probable end of life care. Comfort medications are ordered, and I will add Glycopyrrolate IV prn. The children want to talk with the patient and her spouse. 2. Ischemic colitis with pneumoperitoneum and pneumatosis  3. Hypotension with history of hypertension  4. Pulmonary metastases  5. Elevated Lactate  6.  DNR-comfort care    Patient Active Problem List   Diagnosis Code    Mixed hyperlipidemia E78.2    Aortic valve prosthesis present Z95.2    S/P CABG x 1 Z95.1    Fatigue R53.83    Anemia D64.9    Family history of coronary artery disease Z82.49    Abnormal finding on EKG R94.31    Hypertension I10    Obstruction of descending colon ([address] K56.609    Malignant neoplasm of sigmoid colon ([address] C18.7    Pleural effusion, right J90    Nocturnal hypoxemia G47.34    Syncope and collapse R55    COPD, mild (HCC) J44.9    Multiple lung nodules on CT R91.8    Hypokalemia, gastrointestinal losses E87.6    GIB (gastrointestinal bleeding) K92.2    Acute on chronic anemia D64.9    Lower GI bleed K92.2    Anemia D64.81, T45.1X5A    Drug induced neutropenia D70.2    Malignant neoplasm metastatic to right lung (HCC) C78.01    Liver metastasis (HCC) C78.7    Ischemia, bowel ([facility] [address] K55.9    Acute intestinal ischemia ([facility] [address] X37.573       Certification of Terminal Illness: I certify that this patient is eligible for General Inpatient Hospice services for a terminal diagnosis of metastatic colon cancer with a life expectancy predicted to be less than 6 months, if the illness follows its expected course.     Roberto Trinidad MD, [facility]

## 2020-12-14 NOTE — H&P
History and Physical      Name:  Michael Solorzano /Age/Sex: 1945  (28 y.o. female)   MRN & CSN:  [id number] & [id number] Admission Date/Time: 2020  8:35 PM   Location:   PCP: Ruthie Cruz MD       Hospital Day: 2    Assessment and Plan:   Michael Solorzano is a 77 y.o.  female  who presents with     1. End stage colon cancer with mets to lung and underlying bowel ischemia- NPO. IP Hospice consult. Lactic 6.7. IVF Hydration. CT of abdomen reveals Multiple dilated small bowel loops with diffuse wall thickening and pneumatosis, consistent with bowel ischemia. 2. Ascites and pneumoperitoneum with extensive portal venous gas. 3. Hepatic metastases are less conspicuous than on prior contrasted   examination. 4. Noncalcified pulmonary nodules, consistent with metastases.  These appear increased in size in the interval. IV pain analgesia. 2. Hypertension- BP: ()/(41-54)  Stable, no meds by mouth   3. COPD- stable, NPO with meds     Diet NPO    DVT Prophylaxis [] Lovenox, []  Heparin, [x] SCDs, [] Ambulation   GI Prophylaxis [x] PPI,  [] H2 Blocker,  [] Carafate,  [] Diet/Tube Feeds   Code Status [facility]   Disposition Patient requires continued admission due to need for IP hospice care for end of life and pain control   MDM [] Low, [] Moderate,[x]  High  Patient's risk as above due to  complexity of medical data reviewed and treatment options available        History of Present Illness:     Chief Complaint   Patient presents with    Emesis     Arrives via EMS with c/o n/v/d    Diarrhea       Michael Solorzano is a 77 y.o.  female  who presents with nausea and vomiting x 3 days. Pa Patient was diagnosed with Colon CA with mets in March of this year. She had a previous colostomy in 10/2017. She states she is unable to keep any solids down but can still drink liquids and keep them down. Patient has had diffuse abdominal tenderness throughout.  ED physician spoke with General surgery as imaging shows ischemic bowel. At this time there is no surgical intervention possible and patient would benefit from hospice services to control pain . Ten point ROS reviewed negative, unless as noted above    Objective:   No intake or output data in the 24 hours ending 12/14/20 0137   Vitals:   Vitals:    12/14/20 0100   BP: (!) 101/54   Pulse: 99   Resp: 18   Temp:    SpO2: 99%     Physical Exam:   GEN Awake female, sitting upright in bed toxic appearing. Appears given age. EYES Pupils are equally round. No scleral erythema, discharge, or conjunctivitis. HENT Mucous membranes are moist. Oral pharynx without exudates, no evidence of thrush. NECK Supple, no apparent thyromegaly or masses. RESP Decreased breath sounds bilaterally , no wheezes, rales or rhonchi. Symmetric chest movement while on room air. CARDIO/VASC Tachycardia S1/S2 auscultated. Regular rate without appreciable murmurs, rubs, or gallops. No JVD or carotid bruits. Peripheral pulses equal bilaterally and palpable. No peripheral edema. GI Abdomen is distended, diffusely tender to palpation. Bowel sounds are hypoactive. Rectal exam deferred. Colostomy pink with watery green stool    No costovertebral angle tenderness. Normal appearing external genitalia. Gambino catheter is not present. HEME/LYMPH No palpable cervical lymphadenopathy and no hepatosplenomegaly. No petechiae or ecchymoses. MSK No gross joint deformities. SKIN Pale, diaphoretic   NEURO Cranial nerves appear grossly intact, normal speech, no lateralizing weakness. PSYCH Awake, alert, oriented x 4. Affect appropriate.     Past Medical History:      Past Medical History:   Diagnosis Date    Abnormal CT scan     \"4/10/2019\"found spot on the lung and liver\"     Abnormal finding on EKG     Acid reflux     Aortic stenosis     Aortic valve prosthesis present 8/12/2014 7/30/14 Dr Thalia Garcia #[id number] [employer] tissue valve     AR (aortic regurgitation)     Arthritis     \"Hands Mostly\"  CAD (coronary artery disease)     S/P CABG x1 7/2014-follows with Dr Laverne Sheldon [facility])     colon\"dx in 10/2017- tx with surg and chemo- following with Dr Kristina Parker cancer metastasized to lung [facility]) 3/12/2020    Colostomy care ([facility] [address]     COPD, mild ([facility] [address] 9/6/2018    Diabetes mellitus ([facility] [address]     \"at one time was borderline- but never on medication- and no one said anything about me having this at present\"    Family history of coronary artery disease     Fatigue     H/O 24 hour EKG monitoring 05/03/2018    48-hour Holter monitor showed normal sinus rhythm average rate is 72 bpm lowest rate of 52 occurred at 2:20 AM fastest rate of 104 occurred at 6 PM.  No significant ventricular or supraventricular ectopy is noted. Patient submitted a diary did not report any activities or symptoms during the monitoring    H/O cardiovascular stress test 2/7/12 2/12- Normal study-EF 70%    H/O echocardiogram 5/29/19; 05/22/2018    LV function and size normal w/mild asymmetric LV hypertrophy of septal wall, no regional wall motion abnormalities, diastolic dysfunction Grade I, all chamber dimensions WNL, possible stenosis of the bioprosthetic aortic valve w/mean pressure of 23 mmHg, mild TR, RVSP= 26 mmHg, EF 55-60%. Compared to previous echo, prosthetic AOV mean pressure shows slight decrease from 25 to 23 mmHg.     Heart murmur     Mcgrath (hard of hearing)     Bilateral Ears    Hyperlipidemia     Hypertension     \"use to be on b/p medication and took it untill 10/2017 and they never put me back on it\"    MR (congenital mitral regurgitation)     Multiple lung nodules on CT 3/7/2019    Nocturnal hypoxemia 11/3/2017    Pleural effusion     per old chart pt dx with large pleural effusion and had thoracentesis and chest tube placement done 10/22/2017 and then on 10/26/2017 had thoracentesis and removal of chest tube - also had pt had intrapleural installation of tPA 10/24/2017    Pneumonia Dx 1990's    Prolonged emergence from general anesthesia     \"alittle trouble waking me up\"    S/P CABG x 1 8/12/2014 7/30/14 [facility]- LAD Dr Lazaro Verde Wears glasses      PSHX:  has a past surgical history that includes Dental surgery; Breast lumpectomy (Right, 1980's); Coronary artery bypass graft (7/30/14); Diagnostic Cardiac Cath Lab Procedure (7/28/14); Abdomen surgery; other surgical history (10/07/2017); Cardiac valve replacement (7/30/14); Tunneled venous port placement (1025/2017); Hysterectomy, total abdominal (1980's); and Colonoscopy (N/A, 11/13/2019). Allergies: Allergies   Allergen Reactions    Prilosec [Omeprazole]      \"Got Real Lightheaded\"    Pravachol [Pravastatin Sodium]      \"I Don't Remember The Reaction\"       FAM HX: family history includes Arthritis in her mother; Cancer in her father; Depression in her mother; Hearing Loss in her mother; Heart Disease in her brother; Migraines in her son.   Soc HX:   Social History     Socioeconomic History    Marital status:      Spouse name: None    Number of children: None    Years of education: None    Highest education level: None   Occupational History    None   Social Needs    Financial resource strain: None    Food insecurity     Worry: None     Inability: None    Transportation needs     Medical: None     Non-medical: None   Tobacco Use    Smoking status: Never Smoker    Smokeless tobacco: Never Used    Tobacco comment: Reviewed   Substance and Sexual Activity    Alcohol use: No    Drug use: No    Sexual activity: Yes     Partners: Male     Comment:    Lifestyle    Physical activity     Days per week: None     Minutes per session: None    Stress: None   Relationships    Social connections     Talks on phone: None     Gets together: None     Attends Jehovah's witness service: None     Active member of club or organization: None     Attends meetings of clubs or organizations: None     Relationship status: None  Intimate partner violence     Fear of current or ex partner: None     Emotionally abused: None     Physically abused: None     Forced sexual activity: None   Other Topics Concern    None   Social History Narrative            Never use alcohol        Never use tobacco        Never use drugs        Caffeine 2 cups of coffee per day 1 soda every day Iced tea        Retired [employer] worker               Medications:     Prior to Admission medications    Medication Sig Start Date End Date Taking? Authorizing Provider   DULoxetine (CYMBALTA) 60 MG extended release capsule Take 1 capsule by mouth daily 12/1/20   Lalitha Drew MD   Magic Mouthwash (MIRACLE MOUTHWASH) Swish and spit 5 mLs 4 times daily as needed for Irritation 10/29/20   EDWIN Cole - CNP   ondansetron (ZOFRAN) 8 MG tablet TAKE 1 TABLET BY MOUTH EVERY 8 HOURS AS NEEDED FOR NAUSEA AND VOMITING 10/20/20   Forest Cabrera MD   dexamethasone (DECADRON) 4 MG tablet Take 1 tablet by mouth daily Take 1 tablet by mouth daily x 4 days after each chemotherapy.  10/20/20   Lalitha Drew MD   lisinopril (PRINIVIL;ZESTRIL) 20 MG tablet Take 1 tablet by mouth daily 10/20/20   Lalitha Drew MD   ferrous sulfate 325 (65 Fe) MG tablet Take 1 tablet by mouth daily (with breakfast) 2/27/20   Vinny Warren MD   aspirin 81 MG tablet Take 1 tablet by mouth daily 2/26/20   Vinny Warren MD   Potassium 99 MG TABS Take 1 tablet by mouth daily    Historical Provider, MD   bismuth subsalicylate (PEPTO BISMOL) 262 MG/15ML suspension Take 15 mLs by mouth every 6 hours as needed for Indigestion    Historical Provider, MD   Apoaequorin (PREVAGEN PO) Take 1 each by mouth daily     Historical Provider, MD   Ostomy Supplies (OSTOMY DRAINABLE POUCH/FLANGE) MISC 1 device replaced twice weekly prn 5/16/19   Cem Moy MD   Multiple Vitamins-Minerals (MULTIVITAMIN PO) Take 1 each by mouth daily     Historical Provider, MD   simvastatin (ZOCOR) 40 MG tablet Take 40 mg by mouth nightly. Historical Provider, MD   Calcium Carbonate-Vitamin D (CALCIUM + D) 600-200 MG-UNIT TABS Take 1 tablet by mouth every morning.  Over The Counter    Historical Provider, MD       Electronically signed by EDWIN Mars CNP on 12/14/2020 at 1:37 AM

## 2020-12-15 PROCEDURE — 94761 N-INVAS EAR/PLS OXIMETRY MLT: CPT

## 2020-12-15 PROCEDURE — 1200000000 HC SEMI PRIVATE

## 2020-12-15 PROCEDURE — 6360000002 HC RX W HCPCS: Performed by: NURSE PRACTITIONER

## 2020-12-15 PROCEDURE — 2580000003 HC RX 258: Performed by: NURSE PRACTITIONER

## 2020-12-15 RX ADMIN — HYDROMORPHONE HYDROCHLORIDE 0.25 MG: 1 INJECTION, SOLUTION INTRAMUSCULAR; INTRAVENOUS; SUBCUTANEOUS at 14:05

## 2020-12-15 RX ADMIN — ONDANSETRON 4 MG: 2 INJECTION INTRAMUSCULAR; INTRAVENOUS at 21:46

## 2020-12-15 RX ADMIN — ONDANSETRON 4 MG: 2 INJECTION INTRAMUSCULAR; INTRAVENOUS at 15:46

## 2020-12-15 RX ADMIN — SODIUM CHLORIDE: 9 INJECTION, SOLUTION INTRAVENOUS at 17:57

## 2020-12-15 RX ADMIN — SODIUM CHLORIDE, PRESERVATIVE FREE 10 ML: 5 INJECTION INTRAVENOUS at 08:24

## 2020-12-15 RX ADMIN — HYDROMORPHONE HYDROCHLORIDE 0.25 MG: 1 INJECTION, SOLUTION INTRAMUSCULAR; INTRAVENOUS; SUBCUTANEOUS at 08:24

## 2020-12-15 RX ADMIN — HYDROMORPHONE HYDROCHLORIDE 0.25 MG: 1 INJECTION, SOLUTION INTRAMUSCULAR; INTRAVENOUS; SUBCUTANEOUS at 00:03

## 2020-12-15 RX ADMIN — HYDROMORPHONE HYDROCHLORIDE 0.5 MG: 1 INJECTION, SOLUTION INTRAMUSCULAR; INTRAVENOUS; SUBCUTANEOUS at 22:08

## 2020-12-15 RX ADMIN — ONDANSETRON 4 MG: 2 INJECTION INTRAMUSCULAR; INTRAVENOUS at 08:23

## 2020-12-15 ASSESSMENT — PAIN SCALES - GENERAL
PAINLEVEL_OUTOF10: 7
PAINLEVEL_OUTOF10: 6
PAINLEVEL_OUTOF10: 4
PAINLEVEL_OUTOF10: 6
PAINLEVEL_OUTOF10: 0

## 2020-12-15 ASSESSMENT — PAIN DESCRIPTION - FREQUENCY: FREQUENCY: CONTINUOUS

## 2020-12-15 ASSESSMENT — PAIN DESCRIPTION - DESCRIPTORS: DESCRIPTORS: ACHING;CONSTANT;DISCOMFORT

## 2020-12-15 ASSESSMENT — PAIN - FUNCTIONAL ASSESSMENT: PAIN_FUNCTIONAL_ASSESSMENT: ACTIVITIES ARE NOT PREVENTED

## 2020-12-15 ASSESSMENT — PAIN DESCRIPTION - PAIN TYPE: TYPE: CHRONIC PAIN

## 2020-12-15 ASSESSMENT — PAIN DESCRIPTION - LOCATION: LOCATION: GENERALIZED

## 2020-12-15 ASSESSMENT — PAIN DESCRIPTION - PROGRESSION: CLINICAL_PROGRESSION: GRADUALLY WORSENING

## 2020-12-15 ASSESSMENT — PAIN DESCRIPTION - ONSET: ONSET: ON-GOING

## 2020-12-15 NOTE — PROGRESS NOTES
Recent Labs     12/13/20 2045   WBC 2.0*   HGB 9.5*   HCT 31.4*   MCV 88.7   *      BMP:       Recent Labs     12/13/20  2100      K 3.5   CL 97*   CO2 28   BUN 38*   CREATININE 1.3*   GLUCOSE 192*      Physical Exam:   Blood pressure (!) 117/57, pulse 91, temperature 96.9 °F (36.1 °C), temperature source Oral, resp. rate 16, height 5' 4\" (1.626 m), weight 126 lb 14.4 oz (57.6 kg), SpO2 94 %, not currently breastfeeding. General: drowsy but arousable, slow to respond, mild pallor, grimaces with abdominal exam  HEENT: Mucous membranes are dry, sclerae are clear  Chest: Mediport on right   Heart: tachycardic RRR, S1S2, no murmurs  Lungs:  equal breath sounds bilaterally, diminished at the bases, without rales, scattered rhonchi   Abdomen: soft, bowel sounds quiet, ostomy present with dark stool, there is diffuse L>>R abdominal tenderness with involuntary guarding, + distended  Extremities:  No mottling, toes are cool,   Neurologic: lethargic, generally weak, slow to respond     Assessment/Plan:  1. Colon cancer diagnosed October 2017 with hepatic and pulmonary metastases April 2019 and recent disease progression, presenting with pneumoperitoneum and ischemic colitis. The patient is not felt to be a surgical candidate. Awaiting consents for [address] [place] [address] for the acute management of pain and probable end of life care. Comfort medications are in place. PPS 20%. 2. Ischemic colitis with pneumoperitoneum and pneumatosis  3. Hypotension with history of hypertension  4. Pulmonary metastases  5. Elevated Lactate  6.  DNR-comfort care       Patient Active Problem List   Diagnosis Code    Mixed hyperlipidemia E78.2    Aortic valve prosthesis present Z95.2    S/P CABG x 1 Z95.1    Fatigue R53.83    Family history of coronary artery disease Z82.49    Hypertension I10    Malignant neoplasm of sigmoid colon ([facility] [address] C18.7    Pleural effusion, right J90    Nocturnal hypoxemia G47.34    Syncope and collapse R55    COPD, mild (HCC) J44.9    Multiple lung nodules on CT R91.8    GIB (gastrointestinal bleeding) K92.2    Acute on chronic anemia D64.9    Lower GI bleed K92.2    Anemia D64.81, T45.1X5A    Drug induced neutropenia D70.2    Malignant neoplasm metastatic to right lung (HCC) C78.01    Liver metastasis (HCC) C78.7    Ischemia, bowel (HCC) K55.9    Acute intestinal ischemia ([address] K55.059    Hospice care Z51.5       JAYDEN Rubio MD  12/15/2020

## 2020-12-15 NOTE — PROGRESS NOTES
Pain medication given per daughter and pt's request.  Prior to calling this Rn, daughter states that the pt had a large emesis (which she cleaned up so was unseen). Pt then started having stomach pain/achyness. Then requested something for pain. Pt is not due for nausea medication as of yet and stated she would try the dilaudid and I instructed the daughter and pt to call if no relief and that I could call the NP for further options. Pt and daughter both verbalized understanding.

## 2020-12-15 NOTE — PROGRESS NOTES
1000: Called hospice this afternoon to check on the status of pt's referral. Stated that they were waiting on a call back from . Pt's  is [place] and does not use his phone, and requested that daughter be called instead. Daughter Shahida's number was given. Hospice representative stated they would call her today. 1500: Called hosp[facility] of [place] following a visit from the  from [facility] to the unit, and requested an update on when family would be called to set up a meeting. Representative stated they would push this patients scheduling up and they should receive a call today. Family at bedside and was made aware.

## 2020-12-15 NOTE — PROGRESS NOTES
Hospitalist Progress Note         Admit Date: 12/13/2020    PCP: Sugar Yepez MD     Chief Complaint   Patient presents with    Emesis     Arrives via EMS with c/o n/v/d    Diarrhea        Assessment and Plan:     -Acute intestinal ischemia ([facility] [address]  -Aortic valve prosthesis present  -Liver metastasis ([facility] [address]  - Malignant neoplasm of sigmoid colon ([facility] [address]  -Malignant neoplasm metastatic to right lung ([facility] [address]  -S/P CABG x 1  -Hospice care    1. End-stage colon cancer with mets to the lung: Patient with underlying bowel ischemia. Patient is n.p.o. but requesting something to drink. Will allow diet as able. CT of the abdomen-ischemic colitis with pneumoperitoneum and pneumatosis. Hospice on board and trying to make GIP.     2. Hypertension: Hypotensive at this time. 99/52. No intervention.     3.   COPD: Stable     CODE STATUS DNR CC    Current Facility-Administered Medications   Medication Dose Route Frequency Provider Last Rate Last Admin    ondansetron (ZOFRAN) injection 4 mg  4 mg Intravenous Q6H PRN Dotson Box, APRN - CNP   4 mg at 12/15/20 1546    sodium chloride flush 0.9 % injection 10 mL  10 mL Intravenous 2 times per day Dotson Box, APRN - CNP   10 mL at 12/15/20 0824    sodium chloride flush 0.9 % injection 10 mL  10 mL Intravenous PRN Dotson Box, APRN - CNP        acetaminophen (TYLENOL) tablet 650 mg  650 mg Oral Q4H PRN Dotson Box, APRN - CNP        0.9 % sodium chloride infusion   Intravenous Continuous Dotson Box, APRN - CNP 75 mL/hr at 12/14/20 1608 New Bag at 12/14/20 1608    HYDROmorphone (DILAUDID) injection 0.25 mg  0.25 mg Intravenous Q3H PRN Dotson Box, APRN - CNP   0.25 mg at 12/15/20 1405    Or    HYDROmorphone (DILAUDID) injection 0.5 mg  0.5 mg Intravenous Q3H PRN Dotson Box, APRN - CNP        loperamide (IMODIUM) capsule 2 mg  2 mg Oral PRN Dotson Box, APRN - CNP        atropine 1 % ophthalmic solution 2 drop  2 drop Sublingual Q4H PRN Dotson Box, APRN - CNP  glycopyrrolate (ROBINUL) injection 0.2 mg  0.2 mg Intravenous Q4H PRN Kym Meehan MD           Subjective:     No new complaints. No acute overnight events. Hospice on board. Objective: Intake/Output Summary (Last 24 hours) at 12/15/2020 1643  Last data filed at 12/15/2020 1324  Gross per 24 hour   Intake 180 ml   Output 400 ml   Net -220 ml      Vitals:   Vitals:    12/15/20 1008   BP:    Pulse:    Resp:    Temp:    SpO2: 95%     Physical Exam:  General Appearance:  Drowsy but arousable. Poor response. Head:     Normocephalic, without obvious abnormality, atraumatic  Eyes:     Conjunctiva/corneas clear, EOM's intact  Lungs:  Diminished BS at bases otherwise clear  Heart:              Tachycardia, RRR, S1/S2 heard  Abdomen:   Diffuse abdominal tenderness left >right with guarding +, BS limited  Extremities: No pedal edema, cyanosis  Neurological: Not a good exam due to lethargy    Significant Diagnostic Studies:   DATA:    CBC   Recent Labs     12/13/20 2045   WBC 2.0*   HGB 9.5*   HCT 31.4*   *      BMP   Recent Labs     12/13/20  2100      K 3.5   CL 97*   CO2 28   BUN 38*   CREATININE 1.3*     LFT'S   Recent Labs     12/13/20  2100   AST 50*   ALT 27   BILITOT 1.6*   ALKPHOS 145*     COAG No results for input(s): INR in the last 72 hours. POC:   Lab Results   Component Value Date    POCGLU 162 11/30/2020    POCGLU 122 11/16/2020    POCGLU 175 11/02/2020    POCGLU 139 10/19/2020     [id number]:  Lab Results   Component Value Date    LABA1C 6.0 09/22/2019     CARDIAC ENZYMES  No results for input(s): CKTOTAL, CKMB, CKMBINDEX, TROPONINI in the last 72 hours. Troponin: No results for input(s): TROPONINT in the last 72 hours. BNP: No results for input(s): PROBNP in the last 72 hours.   U/A:    Lab Results   Component Value Date    COLORU YELLOW 12/13/2020    WBCUA NO CELLS SEEN 12/13/2020    RBCUA NO CELLS SEEN 12/13/2020    MUCUS RARE 07/29/2014    BACTERIA NEGATIVE 12/13/2020    CLARITYU CLEAR 12/13/2020    SPECGRAV 1.020 12/13/2020    LEUKOCYTESUR NEGATIVE 12/13/2020    BLOODU NEGATIVE 12/13/2020       Ct Abdomen Pelvis Wo Contrast    Result Date: 12/13/2020  EXAMINATION: CT OF THE ABDOMEN AND PELVIS WITHOUT CONTRAST 12/13/2020 10:03 pm TECHNIQUE: CT of the abdomen and pelvis was performed without the administration of intravenous contrast. Multiplanar reformatted images are provided for review. Dose modulation, iterative reconstruction, and/or weight based adjustment of the mA/kV was utilized to reduce the radiation dose to as low as reasonably achievable. COMPARISON: 10/05/2020 HISTORY: ORDERING SYSTEM PROVIDED HISTORY: diarrhea hx colon cancer and distended TECHNOLOGIST PROVIDED HISTORY: Reason for exam:->diarrhea hx colon cancer and distended Reason for Exam: diarrhea hx colon cancer and distended abd, hx liver metastasis, GIB, bladder repair, bowel resection, colostomy Acuity: Acute Type of Exam: Initial Relevant Medical/Surgical History: diarrhea hx colon cancer and distended abd, hx liver metastasis, GIB, bladder repair, bowel resection, colostomy FINDINGS: Lower Chest: Multiple noncalcified pulmonary nodules in both lung bases. Organs: Multiple hepatic lesions seen on prior examination are not as well visualized on this noncontrast study. Within the limitations of a noncontrast examination, no acute abnormality within the spleen, pancreas, or kidneys. Bilateral adrenal nodules are similar to prior study given difference in technique. GI/Bowel: Stomach is partially distended. There are multiple dilated loops of small bowel with diffuse wall thickening and pneumatosis. There has been prior partial colectomy. There is a left lower quadrant ostomy. Peristomal hernia contains mesenteric fat. Pelvis: Bladder is partially distended without vesicular stone. Prior hysterectomy. Peritoneum/Retroperitoneum: There is small ascites.   There is diffuse mesenteric stranding and edema. Small pneumoperitoneum. There is extensive portal venous gas. Enlarged inguinal, retroperitoneal, and mesenteric lymph nodes. Bones/Soft Tissues: Multilevel degenerative changes of the lumbar spine. 1. Multiple dilated small bowel loops with diffuse wall thickening and pneumatosis, consistent with bowel ischemia. 2. Ascites and pneumoperitoneum with extensive portal venous gas. 3. Hepatic metastases are less conspicuous than on prior contrasted examination. 4. Noncalcified pulmonary nodules, consistent with metastases. These appear increased in size in the interval. Critical results were called by Dr. Adali Clark MD to [place]. [address] on 12/13/2020 at 22:44.            [facility]

## 2020-12-15 NOTE — PLAN OF CARE
Problem: Falls - Risk of:  Goal: Will remain free from falls  Description: Will remain free from falls  12/14/2020 2153 by Sara Bright RN  Outcome: Ongoing  12/14/2020 1026 by Jarred Santana RN  Outcome: Ongoing  Goal: Absence of physical injury  Description: Absence of physical injury  12/14/2020 2153 by Sara Bright RN  Outcome: Ongoing  12/14/2020 1026 by Jarred Santana RN  Outcome: Ongoing     Problem: Skin Integrity:  Goal: Will show no infection signs and symptoms  Description: Will show no infection signs and symptoms  12/14/2020 2153 by Sara Bright RN  Outcome: Ongoing  12/14/2020 1026 by Jarred Santana RN  Outcome: Ongoing  Goal: Absence of new skin breakdown  Description: Absence of new skin breakdown  12/14/2020 2153 by Sara Bright RN  Outcome: Ongoing  12/14/2020 1026 by Jarred Santana RN  Outcome: Ongoing  Goal: Risk for impaired skin integrity will decrease  Description: Risk for impaired skin integrity will decrease  12/14/2020 2153 by Sara Bright RN  Outcome: Ongoing  12/14/2020 1026 by Jarred Santana RN  Outcome: Ongoing     Problem: Coping:  Goal: Ability to participate in care decisions during the dying process will improve  Description: Ability to participate in care decisions during the dying process will improve  12/14/2020 2153 by Sara Bright RN  Outcome: Ongoing  12/14/2020 1026 by Jarred Santana RN  Outcome: Ongoing  Goal: Family's ability to cope with current situation will improve  Description: Family's ability to cope with current situation will improve  12/14/2020 2153 by Sara Bright RN  Outcome: Ongoing  12/14/2020 1026 by Jarred Santana RN  Outcome: Ongoing     Problem: Health Behavior:  Goal: Identification of resources available to assist in meeting health care needs will improve  Description: Identification of resources available to assist in meeting health care needs will improve  12/14/2020 2153 by Sara Bright RN  Outcome: Ongoing  12/14/2020 1026 by Toan Fountain RN  Outcome: Ongoing     Problem: Physical Regulation:  Goal: Complications related to the disease process, condition or treatment will be avoided or minimized  Description: Complications related to the disease process, condition or treatment will be avoided or minimized  12/14/2020 2153 by Matteo Knapp RN  Outcome: Ongoing  12/14/2020 1026 by Toan Fountain RN  Outcome: Ongoing     Problem: Sensory:  Goal: Expressions of feelings of enhanced comfort will increase  Description: Expressions of feelings of enhanced comfort will increase  12/14/2020 2153 by Matteo Knapp RN  Outcome: Ongoing  12/14/2020 1026 by Toan Fountain RN  Outcome: Ongoing

## 2020-12-16 ENCOUNTER — HOSPITAL ENCOUNTER (INPATIENT)
Age: 75
LOS: 3 days | Discharge: HOSPICE | DRG: 951 | End: 2020-12-19
Attending: FAMILY MEDICINE | Admitting: FAMILY MEDICINE
Payer: COMMERCIAL

## 2020-12-16 VITALS
HEIGHT: 64 IN | RESPIRATION RATE: 17 BRPM | SYSTOLIC BLOOD PRESSURE: 121 MMHG | HEART RATE: 90 BPM | OXYGEN SATURATION: 96 % | WEIGHT: 126.9 LBS | DIASTOLIC BLOOD PRESSURE: 69 MMHG | BODY MASS INDEX: 21.66 KG/M2 | TEMPERATURE: 97.3 F

## 2020-12-16 PROBLEM — K55.9 ISCHEMIC COLITIS (HCC): Status: ACTIVE | Noted: 2020-12-16

## 2020-12-16 PROCEDURE — 94761 N-INVAS EAR/PLS OXIMETRY MLT: CPT

## 2020-12-16 PROCEDURE — 6370000000 HC RX 637 (ALT 250 FOR IP): Performed by: FAMILY MEDICINE

## 2020-12-16 PROCEDURE — 1250000000 HC SEMI PRIVATE HOSPICE R&B

## 2020-12-16 PROCEDURE — 6360000002 HC RX W HCPCS: Performed by: NURSE PRACTITIONER

## 2020-12-16 PROCEDURE — 51798 US URINE CAPACITY MEASURE: CPT

## 2020-12-16 PROCEDURE — 6360000002 HC RX W HCPCS: Performed by: FAMILY MEDICINE

## 2020-12-16 RX ORDER — SODIUM CHLORIDE 9 MG/ML
500 INJECTION, SOLUTION INTRAVENOUS CONTINUOUS
Status: DISCONTINUED | OUTPATIENT
Start: 2020-12-16 | End: 2020-12-19 | Stop reason: HOSPADM

## 2020-12-16 RX ORDER — GLYCOPYRROLATE 0.2 MG/ML
0.2 INJECTION INTRAMUSCULAR; INTRAVENOUS EVERY 4 HOURS PRN
Status: DISCONTINUED | OUTPATIENT
Start: 2020-12-16 | End: 2020-12-19 | Stop reason: HOSPADM

## 2020-12-16 RX ORDER — HALOPERIDOL 2 MG/ML
1 SOLUTION ORAL EVERY 8 HOURS SCHEDULED
Status: DISCONTINUED | OUTPATIENT
Start: 2020-12-16 | End: 2020-12-16 | Stop reason: HOSPADM

## 2020-12-16 RX ORDER — SODIUM CHLORIDE 0.9 % (FLUSH) 0.9 %
10 SYRINGE (ML) INJECTION EVERY 12 HOURS SCHEDULED
Status: DISCONTINUED | OUTPATIENT
Start: 2020-12-16 | End: 2020-12-19 | Stop reason: HOSPADM

## 2020-12-16 RX ORDER — HALOPERIDOL 5 MG/ML
1 INJECTION INTRAMUSCULAR EVERY 4 HOURS PRN
Status: DISCONTINUED | OUTPATIENT
Start: 2020-12-16 | End: 2020-12-19 | Stop reason: HOSPADM

## 2020-12-16 RX ORDER — ACETAMINOPHEN 650 MG/1
650 SUPPOSITORY RECTAL EVERY 4 HOURS PRN
Status: DISCONTINUED | OUTPATIENT
Start: 2020-12-16 | End: 2020-12-19 | Stop reason: HOSPADM

## 2020-12-16 RX ORDER — HALOPERIDOL 2 MG/ML
1 SOLUTION ORAL EVERY 8 HOURS SCHEDULED
Status: CANCELLED | OUTPATIENT
Start: 2020-12-16

## 2020-12-16 RX ORDER — ONDANSETRON 2 MG/ML
4 INJECTION INTRAMUSCULAR; INTRAVENOUS EVERY 6 HOURS PRN
Status: DISCONTINUED | OUTPATIENT
Start: 2020-12-16 | End: 2020-12-19 | Stop reason: HOSPADM

## 2020-12-16 RX ORDER — SODIUM CHLORIDE 0.9 % (FLUSH) 0.9 %
10 SYRINGE (ML) INJECTION PRN
Status: DISCONTINUED | OUTPATIENT
Start: 2020-12-16 | End: 2020-12-19 | Stop reason: HOSPADM

## 2020-12-16 RX ORDER — LORAZEPAM 2 MG/ML
0.5 INJECTION INTRAMUSCULAR
Status: DISCONTINUED | OUTPATIENT
Start: 2020-12-16 | End: 2020-12-19 | Stop reason: HOSPADM

## 2020-12-16 RX ORDER — HALOPERIDOL 5 MG/ML
1 INJECTION INTRAMUSCULAR EVERY 4 HOURS PRN
Status: CANCELLED | OUTPATIENT
Start: 2020-12-16

## 2020-12-16 RX ORDER — SODIUM CHLORIDE 9 MG/ML
INJECTION, SOLUTION INTRAVENOUS CONTINUOUS
Status: CANCELLED | OUTPATIENT
Start: 2020-12-16

## 2020-12-16 RX ORDER — HALOPERIDOL 5 MG/ML
1 INJECTION INTRAMUSCULAR EVERY 4 HOURS PRN
Status: DISCONTINUED | OUTPATIENT
Start: 2020-12-16 | End: 2020-12-16 | Stop reason: HOSPADM

## 2020-12-16 RX ORDER — HALOPERIDOL 2 MG/ML
1 SOLUTION ORAL EVERY 8 HOURS SCHEDULED
Status: DISCONTINUED | OUTPATIENT
Start: 2020-12-16 | End: 2020-12-17

## 2020-12-16 RX ADMIN — ONDANSETRON 4 MG: 2 INJECTION INTRAMUSCULAR; INTRAVENOUS at 04:54

## 2020-12-16 RX ADMIN — Medication 1 MG: at 14:28

## 2020-12-16 RX ADMIN — HALOPERIDOL LACTATE 1 MG: 5 INJECTION, SOLUTION INTRAMUSCULAR at 10:37

## 2020-12-16 RX ADMIN — Medication 1 MG: at 21:25

## 2020-12-16 ASSESSMENT — PAIN SCALES - GENERAL
PAINLEVEL_OUTOF10: 0

## 2020-12-16 NOTE — H&P
[address] [place] [address]+    Date: 12/16/2020  Name: Michael Solorzano  MRN: [id number]  YOB: 1945   Patient's PCP: Ruthie Cruz MD   Oncology: Dr. Renetta Hudson  Referring Physician: Shirley Evans CNP  Acute care admit date: 12/13 to 12/16/2020 at [address] [place] [address] to General Inpatient Hospice: 12/16/2020    Informant: Chart reviewed, discussed with the patient's nurse, and I met with the patient and her son, Alejandrina Pascual, and daughter, Claire Carreno, at the bedside. The patient awakens and provides some history. CC:  Abdominal pain    Kenaitze: This is a 77year old patient with a history of colon cancer diagnosed in October 2017, metastatic to lung (in April 2019) and liver on chemotherapy with prior colon resection with colostomy by Dr Alison Wilkerson, and other history of hypertension, aortic stenosis with valve replacement, GERD, who was admitted on 12/13/2020 with severe abdominal pain, nausea and vomiting. The patient had recent disease progression by CT imaging in October 2020 and her most recent chemotherapy was 12/1/20 (see Oncology summary below). In the ED, lactate was elevated at 6.7 mMol/liter, white count was 2,000 with monocytosis, serum albumin is 2.9 gm/dl. CT-scan of abdomen and pelvis showed pneumoperitoneum with extensive portal venous gas, dilated small bowel loops with wall thickening, and was felt to be consistent with ischemic colitis. Dr Alison Wilkerson was contacted, and did not feel the the patient would benefit from surgery, and the patient was admitted to the hospitalist service at [facility], and hospice consulted. The patient is currently comfortable unless her abdomen is examined. She does have liquid ostomy output. Hospice philosophy was discussed with the patient and 2 children regarding care and comfort at the end of life. I am not sure how much the patient comprehended due to her clinical status.  Questions were answered, and emotional support was provided. They are aware that [address] [place] [address] is for the acute management of symptoms, and if the patient stabilizes, alternative arrangements for home Hospice or extended care facility will be necessary. The patient is DNR-comfort care status. On 12/16/20, the patient is currently comfortable, with intermittent pain and nausea. The Zofran wasn't helping the nausea, and Hydromorphone helps the pain. She is drinking fluids. Her abdomen is distended and there is ostomy output. I collaborated with the patient's nurse, case management, and with the patient's son, Baylee Salazar, at the bedside. Discussion was had regarding home hospice.       I collaborated with the hospice nurse liaison who met with the patient's family at 0900 12/16/20 for consents for General Inpatient Hospice. The family is asking about home hospice and that was discussed, but the patient is admitted to [address] [place] [address] for management of nausea, and we will start scheduled and prn Haloperidol for the nausea. Oncology history per Dr Ky Munoz note 12/1/2020:  Ms. 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       She initially presented to [facility] on October 7, 2017, with nausea and vomiting. She had a CT angiogram of the abdomen on admission, October 7, 2017, and it showed irregular, asymmetric wall thickening involving a 4 cm segment of sigmoid colon with somewhat nodular infiltration of adjacent fat. Dilatation proximally which is most prominent involving the colon at the hepatic flexure where it measures up to 8.9 cm. Findings are concerning for obstructing sigmoid neoplasm.   She also has a mildly enlarged lymph node anterior to the distal iliac vasculature. She was subsequently evaluated by Dr. Levar Mckeon and she underwent exploratory laparotomy, sigmoid colon resection with end colostomy, bladder dome resection and closure, segment of small bowel resection, on October 7, 2017.       Final pathology revealed low-grade invasive adenocarcinoma. Chronic inflammation and fibrosis with focal abscess associated with invasive adenocarcinoma. Small segment of small intestine revealed mild chronic nonspecific mucosal enteritis. Tumor size was 4.3 x 4.5 cm in diameter and all the surgical margins were negative. Lymphovascular invasion and perineural invasion were present. Eight out of 11 lymph nodes examined were positive for metastasis (pT3 pN2b pMx).    Her CEA level on October 8, 2017, was 6.5 ng/mL. She was discharged from the hospital with an appointment to see me as an outpatient.       After a long discussion with Ms. Snato, she has decided to have adjuvant chemotherapy with FOLFOX for her stage IIIC colon cancer. Adjuvant chemotherapy with FOLFOX was started on November 28, 2017 and we stopped it on April 24, 2018 (total 10 treatments), after she experienced syncope attack after last treatment. We decided to follow her closely.     She underwent colonoscopy by Dr. Levar Mckeon on January 24, 2018 and it was a normal study according to her.     She has CT scan of the chest, abdomen and pelvis on October 15, 2018 and it showed postsurgical changes from partial colectomy with left lower quadrant colostomy. Bilateral 2-6 mm pulmonary nodules, new since 2014, one of which has slightly increased in size since 2017 in the left upper lobe. These are suspicious for metastatic disease. Grossly stable 1.2 cm hypoenhancing lesion in segment VII of liver which was less conspicuous on the prior exam. Given stability, findings favor benign process. No new findings of metastatic disease in the abdomen or pelvis.  Cholelithiasis.     CT scan with FOLFOX and panitumumab was started on 5/22/19 and we stopped it on July 1, 2020 since she is found to have progression of the disease.      Second chemotherapy with FOLFIRI and Panitumumab was started on July 15, 2020. We stopped it after 9/29/20 therapy since she is found to have progression of the disease. She couldn't handle well on FOLFIRI and panitumumab.      CT scan on 10/5/20 showed overall significant progression of metastatic disease.      Decision was made to rechallenge with FOLFOX along with avastin since she never receives avastin before. We started it on 10/20/20. She is tolerating FOLFOX and avastin better. She doesn't encounter any major side effects from it. I recommend her to continue with it for now and I will schedule for her next therapy.      I will continue to monitor her CEA closely and will consider to have repeat scans in 3 to 4 months. If she fails to respond to FOLFOX and avastin, will consider using either lonsurf or stivarga.      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 I will continue to monitor her neuropathy closely.       Besides that, she does not have any other significant symptoms at today's visit.     Past Medical History:   Diagnosis Date    Abnormal CT scan     \"4/10/2019\"found spot on the lung and liver\"     Abnormal finding on EKG     Acid reflux     Aortic stenosis     Aortic valve prosthesis present 8/12/2014 7/30/14 Dr Ashlee Tee #23 [employer] tissue valve     AR (aortic regurgitation)     Arthritis     \"Hands Mostly\"    CAD (coronary artery disease)     S/P CABG x1 7/2014-follows with Dr Marquis Grade [facility])     colon\"dx in 10/2017- tx with surg and chemo- following with Dr Cindy Joseph cancer metastasized to lung [facility]) 3/12/2020    Colostomy care ([facility] [address]     COPD, mild ([address] 9/6/2018    Diabetes mellitus ([facility] [address]     \"at one time was borderline- but never on medication- and no one Dr. Beatriz Gomez) w/CABG X1    COLONOSCOPY N/A 11/13/2019    COLONOSCOPY DIAGNOSTIC/STOMA performed by José Luis Byrd MD at [facility]  7/30/14    CABG x1 (LIMA to LAD) w/AVR     DENTAL SURGERY      Teeth Extracted In Past    DIAGNOSTIC CARDIAC CATH LAB PROCEDURE  7/28/14    Critical aortic stenosis, 70-80% proximal LAD lesion w/ulcerated plaque, aortic root dilatation, LVgram not done.     HYSTERECTOMY, TOTAL ABDOMINAL  1980's    \"not sure if they took the ovaries\"    OTHER SURGICAL HISTORY  10/07/2017    exp lap, small bowel resection, sigmoid colon resection, partial bladder repair, creation of colostomy    TUNNELED VENOUS PORT PLACEMENT  1025/2017    per old chart pt had port insertion with admission 10/2017       Social History     Socioeconomic History    Marital status:      Spouse name: Not on file    Number of children: Not on file    Years of education: Not on file    Highest education level: Not on file   Occupational History    Not on file   Social Needs    Financial resource strain: Not on file    Food insecurity     Worry: Not on file     Inability: Not on file   [employer] needs     Medical: Not on file     Non-medical: Not on file   Tobacco Use    Smoking status: Never Smoker    Smokeless tobacco: Never Used    Tobacco comment: Reviewed   Substance and Sexual Activity    Alcohol use: No    Drug use: No    Sexual activity: Yes     Partners: Male     Comment:    Lifestyle    Physical activity     Days per week: Not on file     Minutes per session: Not on file    Stress: Not on file   Relationships    Social connections     Talks on phone: Not on file     Gets together: Not on file     Attends Jewish service: Not on file     Active member of club or organization: Not on file     Attends meetings of clubs or organizations: Not on file     Relationship status: Not on file    Intimate partner violence     Fear of current or ex partner: Not on file     Emotionally abused: Not on file     Physically abused: Not on file     Forced sexual activity: Not on file   Other Topics Concern    Not on file   Social History Narrative            Never use alcohol        Never use tobacco        Never use drugs        Caffeine 2 cups of coffee per day 1 soda every day Iced tea        Retired [employer] worker               Family History   Problem Relation Age of Onset    Arthritis Mother     Depression Mother     Hearing Loss Mother     Cancer Father         colon cancer    Heart Disease Brother         Heart Surgery    Migraines Son        Allergies   Allergen Reactions    Prilosec [Omeprazole]      \"Got Real Lightheaded\"    Pravachol [Pravastatin Sodium]      \"I Don't Remember The Reaction\"       Medications reviewed  Prior to Admission medications    Medication Sig Start Date End Date Taking? Authorizing Provider   DULoxetine (CYMBALTA) 60 MG extended release capsule Take 1 capsule by mouth daily 12/1/20   Kory Gan MD   Magic Mouthwash (MIRACLE MOUTHWASH) Swish and spit 5 mLs 4 times daily as needed for Irritation 10/29/20   EDWIN Tapia - CNP   ondansetron (ZOFRAN) 8 MG tablet TAKE 1 TABLET BY MOUTH EVERY 8 HOURS AS NEEDED FOR NAUSEA AND VOMITING 10/20/20   Forest Mackenzie MD   dexamethasone (DECADRON) 4 MG tablet Take 1 tablet by mouth daily Take 1 tablet by mouth daily x 4 days after each chemotherapy.  10/20/20   Kory Gan MD   lisinopril (PRINIVIL;ZESTRIL) 20 MG tablet Take 1 tablet by mouth daily 10/20/20   Kory Gan MD   ferrous sulfate 325 (65 Fe) MG tablet Take 1 tablet by mouth daily (with breakfast) 2/27/20   Gaye Marquis MD   aspirin 81 MG tablet Take 1 tablet by mouth daily 2/26/20   Gaye Marquis MD   Potassium 99 MG TABS Take 1 tablet by mouth daily    Historical Provider, MD   bismuth subsalicylate (PEPTO BISMOL) 262 MG/15ML suspension Take 15 mLs by mouth every 6 hours as needed for Indigestion    Historical Provider, MD   Apoaequorin (PREVAGEN PO) Take 1 each by mouth daily     Historical Provider, MD   Ostomy Supplies (OSTOMY DRAINABLE POUCH/FLANGE) MISC 1 device replaced twice weekly prn 5/16/19   Jose R Cheung MD   Multiple Vitamins-Minerals (MULTIVITAMIN PO) Take 1 each by mouth daily     Historical Provider, MD   simvastatin (ZOCOR) 40 MG tablet Take 40 mg by mouth nightly. Historical Provider, MD   Calcium Carbonate-Vitamin D (CALCIUM + D) 600-200 MG-UNIT TABS Take 1 tablet by mouth every morning. Over The Counter    Historical Provider, MD       ROS: As noted in [address], all other systems are limited due to the patient's clinical condition, but reviewed as able from the chart, family and are negative or as follows:  Constitutional: No fever, chills, + weight loss  HEENT:  No headache, nasal drainage,   CV:  No chest pain, palpitations  PULM:  No cough, shortness of breath, hemoptysis  GI:  see above  :  No dysuria, hematuria  Musculoskeletal:  No edema,   Neuro: No seizures, syncope, + weakness  Skin:  No rash    Weight:    Wt Readings from Last 3 Encounters:   12/14/20 126 lb 14.4 oz (57.6 kg)   12/01/20 130 lb 12.8 oz (59.3 kg)   12/01/20 130 lb (59 kg)       Data reviewed 12/16/2020:  CT-scan of abdomen and pelvis 12/13/20:  1. Multiple dilated small bowel loops with diffuse wall thickening and   pneumatosis, consistent with bowel ischemia. 2. Ascites and pneumoperitoneum with extensive portal venous gas. 3. Hepatic metastases are less conspicuous than on prior contrasted   examination. 4. Noncalcified pulmonary nodules, consistent with metastases.   These appear   increased in size in the interval.     Final Pathologic Diagnosis 4/24/2019:    Lung, needle biopsy:   -  METASTATIC ADENOCARCINOMA CONSISTENT WITH A COLORECTAL PRIMARY       CEA 11/2/20: 146.8 ==> 114.6 on 11/16/20    Lactate 6.7  Recent Labs     12/13/20  2100   AST 50*   ALT 27   LIPASE 5*   BILITOT 1.6* ALKPHOS 145*     Lab Results   Component Value Date    ALKPHOS 145 12/13/2020    ALT 27 12/13/2020    AST 50 12/13/2020    PROT 5.8 12/13/2020    PROT 7.5 12/10/2015    BILITOT 1.6 12/13/2020    BILIDIR 0.3 12/10/2015    LABALBU 2.9 12/13/2020     CBC:   Recent Labs     12/13/20  2045   WBC 2.0*   HGB 9.5*   HCT 31.4*   MCV 88.7   *     BMP:   Recent Labs     12/13/20  2100      K 3.5   CL 97*   CO2 28   BUN 38*   CREATININE 1.3*   GLUCOSE 192*     Physical Exam:   Blood pressure 121/69, pulse 90, temperature 97.3 °F (36.3 °C), temperature source Oral, resp. rate 17, height 5' 4\" (1.626 m), weight 126 lb 14.4 oz (57.6 kg), SpO2 96 %, not currently breastfeeding. General: drowsy but arousable, mild pallor, mild grimacing with abdominal exam  HEENT: Mucous membranes are dry, sclerae are clear  Chest: Mediport on right   Heart: mildly tachycardic RRR, S1S2, no murmurs  Lungs:  equal breath sounds bilaterally, diminished at the bases, without rales, scattered rhonchi   Abdomen: soft, bowel sounds quiet, ostomy present with liquid output, there is L>>R abdominal tenderness without guarding today, + distended  Extremities:  No mottling, toes are cool,   Neurologic: lethargic, generally weak     Assessment/Plan:  1. Colon cancer diagnosed October 2017 with hepatic and pulmonary metastases April 2019 and recent disease progression, presenting with pneumoperitoneum and ischemic colitis. The patient was not felt to be a surgical candidate. Admit to [address] [place] [address] for management of pain and nausea and consider transition to home hospice when able. Comfort medications are in place. PPS 20%. 2. Ischemic colitis with pneumoperitoneum and pneumatosis  3. Hypotension with history of hypertension, off her home medications  4. Pulmonary metastases  5. Elevated Lactate  6.  DNR-comfort care    Patient Active Problem List   Diagnosis Code    Mixed hyperlipidemia E78.2    Aortic valve prosthesis present Z95.2    S/P CABG x 1 Z95.1    Fatigue R53.83    Family history of coronary artery disease Z82.49    Hypertension I10    Malignant neoplasm of sigmoid colon (HCC) C18.7    Pleural effusion, right J90    Nocturnal hypoxemia G47.34    Syncope and collapse R55    COPD, mild (HCC) J44.9    Multiple lung nodules on CT R91.8    GIB (gastrointestinal bleeding) K92.2    Acute on chronic anemia D64.9    Lower GI bleed K92.2    Anemia D64.81, T45.1X5A    Drug induced neutropenia D70.2    Malignant neoplasm metastatic to right lung (HCC) C78.01    Liver metastasis (HCC) C78.7    Ischemia, bowel (HCC) K55.9    Acute intestinal ischemia ([facility] [address] K55.059    Hospice care Z51.5    Ischemic colitis ([facility] [address] E90.9       Certification of Terminal Illness: I certify that this patient is eligible for General Inpatient Hospice services for a terminal diagnosis of metastatic colon cancer with a life expectancy predicted to be less than 6 months, if the illness follows its expected course.     Brenda Vital MD, [facility]

## 2020-12-16 NOTE — PROGRESS NOTES
[address] [place] [address]  Progress Note    Date: 12/16/2020  Name: Marian Mensah  MRN: [id number]  YOB: 1945   Patient's PCP: Carlos Davison MD   Oncology: Dr. Jone Arteaga care admission: 12/13/2020 at [facility]    Subjective: The patient is currently comfortable, and has intermittent pain and nausea. The Zofran and Hydromorphone help. She is drinking fluids. Her abdomen is distended and there is ostomy output. I collaborated with the patient's nurse, case management, and with the patient's son, Thomas Winkler, at the bedside. Discussion was had regarding home hospice. The hospice nurse liaison is to meet with the patient's family at 0900 12/16/20 for consents for [address] [place] [address], and the patient remains on the hospitalist service at this time. Objective:   Pain is managed with Hydromorphone IV prn with 3 doses in the past 24 hours, Glycopyrrolate IV is available for secretions, and Lorazepam is available for anxiety. Data reviewed 12/16/2020:  CT-scan of abdomen and pelvis 12/13/20:  1. Multiple dilated small bowel loops with diffuse wall thickening and   pneumatosis, consistent with bowel ischemia. 2. Ascites and pneumoperitoneum with extensive portal venous gas. 3. Hepatic metastases are less conspicuous than on prior contrasted   examination.    4. Noncalcified pulmonary nodules, consistent with metastases.  These appear   increased in size in the interval.      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         CEA 11/2/20: 146.8 ==> 114.6 on 11/16/20     Lactate 6.7      Recent Labs     12/13/20  2100   AST 50*   ALT 27   LIPASE 5*   BILITOT 1.6*   ALKPHOS 145*            Lab Results   Component Value Date     ALKPHOS 145 12/13/2020     ALT 27 12/13/2020     AST 50 12/13/2020     PROT 5.8 12/13/2020     PROT 7.5 12/10/2015     BILITOT 1.6 12/13/2020     BILIDIR 0.3 12/10/2015     LABALBU 2.9 12/13/2020      CBC:   Recent Labs     12/13/20  2045   WBC 2.0*   HGB 9.5*   HCT 31.4*   MCV 88.7   *      BMP:       Recent Labs     12/13/20  2100      K 3.5   CL 97*   CO2 28   BUN 38*   CREATININE 1.3*   GLUCOSE 192*      Physical Exam:   Blood pressure 121/69, pulse 90, temperature 97.3 °F (36.3 °C), temperature source Oral, resp. rate 17, height 5' 4\" (1.626 m), weight 126 lb 14.4 oz (57.6 kg), SpO2 96 %, not currently breastfeeding. General: drowsy but arousable, mild pallor, mild grimacing with abdominal exam  HEENT: Mucous membranes are dry, sclerae are clear  Chest: Mediport on right   Heart: tachycardic mildly tachycardic RRR, S1S2, no murmurs  Lungs:  equal breath sounds bilaterally, diminished at the bases, without rales, scattered rhonchi   Abdomen: soft, bowel sounds quiet, ostomy present with liquid output, there is L>>R abdominal tenderness without guarding today, + distended  Extremities:  No mottling, toes are cool,   Neurologic: lethargic, generally weak     Assessment/Plan:  1. Colon cancer diagnosed October 2017 with hepatic and pulmonary metastases April 2019 and recent disease progression, presenting with pneumoperitoneum and ischemic colitis. The patient was not felt to be a surgical candidate. The patient has done better than I expected, and wonder if the ischemia has improved some? The hospice nurse liaison is meeting with the family. We discussed the possibility of home hospice. Awaiting consents for Hospice. Comfort medications are in place. PPS 20%. 2. Ischemic colitis with pneumoperitoneum and pneumatosis  3. Hypotension with history of hypertension, off her home medications  4. Pulmonary metastases  5. Elevated Lactate  6.  DNR-comfort care       Patient Active Problem List   Diagnosis Code    Mixed hyperlipidemia E78.2    Aortic valve prosthesis present Z95.2    S/P CABG x 1 Z95.1    Fatigue R53.83    Family history of coronary artery disease Z82.49    Hypertension I10    Malignant neoplasm of sigmoid colon ([facility] [address] C18.7    Pleural effusion, right J90    Nocturnal hypoxemia G47.34    Syncope and collapse R55    COPD, mild (HCC) J44.9    Multiple lung nodules on CT R91.8    GIB (gastrointestinal bleeding) K92.2    Acute on chronic anemia D64.9    Lower GI bleed K92.2    Anemia D64.81, T45.1X5A    Drug induced neutropenia D70.2    Malignant neoplasm metastatic to right lung (HCC) C78.01    Liver metastasis (HCC) C78.7    Ischemia, bowel (HCC) K55.9    Acute intestinal ischemia ([facility] [address] K55.059    Hospice care Z51.5       J. Corie Hammans, MD  12/16/2020

## 2020-12-16 NOTE — PLAN OF CARE
Problem: Falls - Risk of:  Goal: Will remain free from falls  Description: Will remain free from falls  12/16/2020 0021 by Kori Cha RN  Outcome: Ongoing  12/15/2020 1716 by Fred Mojica RN  Outcome: Ongoing  Goal: Absence of physical injury  Description: Absence of physical injury  12/16/2020 0021 by Kori Cha RN  Outcome: Ongoing  12/15/2020 1716 by Fred Mojica RN  Outcome: Ongoing     Problem: Skin Integrity:  Goal: Will show no infection signs and symptoms  Description: Will show no infection signs and symptoms  12/16/2020 0021 by Kori Cha RN  Outcome: Ongoing  12/15/2020 1716 by Fred Mojica RN  Outcome: Ongoing  Goal: Absence of new skin breakdown  Description: Absence of new skin breakdown  12/16/2020 0021 by Kori Cha RN  Outcome: Ongoing  12/15/2020 1716 by Fred Mojica RN  Outcome: Ongoing  Goal: Risk for impaired skin integrity will decrease  Description: Risk for impaired skin integrity will decrease  12/16/2020 0021 by Kori Cha RN  Outcome: Ongoing  12/15/2020 1716 by Fred Mojica RN  Outcome: Ongoing     Problem: Coping:  Goal: Ability to participate in care decisions during the dying process will improve  Description: Ability to participate in care decisions during the dying process will improve  12/16/2020 0021 by Kori Cha RN  Outcome: Ongoing  12/15/2020 1716 by Fred Mojica RN  Outcome: Ongoing  Goal: Family's ability to cope with current situation will improve  Description: Family's ability to cope with current situation will improve  12/16/2020 0021 by Kori Cha RN  Outcome: Ongoing  12/15/2020 1716 by Fred Mojica RN  Outcome: Ongoing     Problem: Health Behavior:  Goal: Identification of resources available to assist in meeting health care needs will improve  Description: Identification of resources available to assist in meeting health care needs will improve  12/16/2020 0021 by Terra Thorpe RN  Outcome: Ongoing  12/15/2020 1716 by Carlos Eduardo Minaya RN  Outcome: Ongoing     Problem: Physical Regulation:  Goal: Complications related to the disease process, condition or treatment will be avoided or minimized  Description: Complications related to the disease process, condition or treatment will be avoided or minimized  12/16/2020 0021 by Terra Thorpe RN  Outcome: Ongoing  12/15/2020 1716 by Carlos Eduardo Minaya RN  Outcome: Ongoing     Problem: Sensory:  Goal: Expressions of feelings of enhanced comfort will increase  Description: Expressions of feelings of enhanced comfort will increase  12/16/2020 0021 by Terra Thorpe RN  Outcome: Ongoing  12/15/2020 1716 by Carlos Eduardo Minaya RN  Outcome: Ongoing

## 2020-12-16 NOTE — PROGRESS NOTES
12/13/2020       Ct Abdomen Pelvis Wo Contrast    Result Date: 12/13/2020  EXAMINATION: CT OF THE ABDOMEN AND PELVIS WITHOUT CONTRAST 12/13/2020 10:03 pm TECHNIQUE: CT of the abdomen and pelvis was performed without the administration of intravenous contrast. Multiplanar reformatted images are provided for review. Dose modulation, iterative reconstruction, and/or weight based adjustment of the mA/kV was utilized to reduce the radiation dose to as low as reasonably achievable. COMPARISON: 10/05/2020 HISTORY: ORDERING SYSTEM PROVIDED HISTORY: diarrhea hx colon cancer and distended TECHNOLOGIST PROVIDED HISTORY: Reason for exam:->diarrhea hx colon cancer and distended Reason for Exam: diarrhea hx colon cancer and distended abd, hx liver metastasis, GIB, bladder repair, bowel resection, colostomy Acuity: Acute Type of Exam: Initial Relevant Medical/Surgical History: diarrhea hx colon cancer and distended abd, hx liver metastasis, GIB, bladder repair, bowel resection, colostomy FINDINGS: Lower Chest: Multiple noncalcified pulmonary nodules in both lung bases. Organs: Multiple hepatic lesions seen on prior examination are not as well visualized on this noncontrast study. Within the limitations of a noncontrast examination, no acute abnormality within the spleen, pancreas, or kidneys. Bilateral adrenal nodules are similar to prior study given difference in technique. GI/Bowel: Stomach is partially distended. There are multiple dilated loops of small bowel with diffuse wall thickening and pneumatosis. There has been prior partial colectomy. There is a left lower quadrant ostomy. Peristomal hernia contains mesenteric fat. Pelvis: Bladder is partially distended without vesicular stone. Prior hysterectomy. Peritoneum/Retroperitoneum: There is small ascites. There is diffuse mesenteric stranding and edema. Small pneumoperitoneum. There is extensive portal venous gas.   Enlarged inguinal, retroperitoneal, and mesenteric

## 2020-12-16 NOTE — CARE COORDINATION
ROSANGELA spoke with Dr. Matt Stevenson who advised that his understanding is that a hospice nurse will be here this morning at 9 AM.  Dr. Matt Stevenson advised that the patient has improved and she and the family are now considering returning home with hospice rather than inpatient hospice but the nurse will develop the plan today.

## 2020-12-17 PROCEDURE — 1250000000 HC SEMI PRIVATE HOSPICE R&B

## 2020-12-17 PROCEDURE — 6370000000 HC RX 637 (ALT 250 FOR IP): Performed by: FAMILY MEDICINE

## 2020-12-17 PROCEDURE — 2580000003 HC RX 258: Performed by: FAMILY MEDICINE

## 2020-12-17 RX ORDER — MORPHINE SULFATE 20 MG/ML
10 SOLUTION ORAL
Status: DISCONTINUED | OUTPATIENT
Start: 2020-12-17 | End: 2020-12-19 | Stop reason: HOSPADM

## 2020-12-17 RX ORDER — HALOPERIDOL 2 MG/ML
1 SOLUTION ORAL 2 TIMES DAILY
Status: DISCONTINUED | OUTPATIENT
Start: 2020-12-17 | End: 2020-12-19 | Stop reason: HOSPADM

## 2020-12-17 RX ORDER — MORPHINE SULFATE 20 MG/ML
5 SOLUTION ORAL
Status: DISCONTINUED | OUTPATIENT
Start: 2020-12-17 | End: 2020-12-19 | Stop reason: HOSPADM

## 2020-12-17 RX ADMIN — SODIUM CHLORIDE 500 ML: 9 INJECTION, SOLUTION INTRAVENOUS at 20:45

## 2020-12-17 RX ADMIN — Medication 1 MG: at 05:22

## 2020-12-17 RX ADMIN — MORPHINE SULFATE 5 MG: 20 SOLUTION ORAL at 17:19

## 2020-12-17 RX ADMIN — MORPHINE SULFATE 5 MG: 20 SOLUTION ORAL at 20:47

## 2020-12-17 RX ADMIN — MORPHINE SULFATE 5 MG: 20 SOLUTION ORAL at 12:35

## 2020-12-17 RX ADMIN — Medication 1 MG: at 17:19

## 2020-12-17 ASSESSMENT — PAIN SCALES - GENERAL
PAINLEVEL_OUTOF10: 5
PAINLEVEL_OUTOF10: 4
PAINLEVEL_OUTOF10: 5

## 2020-12-17 ASSESSMENT — PAIN DESCRIPTION - PAIN TYPE: TYPE: ACUTE PAIN

## 2020-12-17 ASSESSMENT — PAIN DESCRIPTION - LOCATION: LOCATION: GENERALIZED

## 2020-12-17 ASSESSMENT — PAIN DESCRIPTION - FREQUENCY: FREQUENCY: INTERMITTENT

## 2020-12-17 NOTE — PLAN OF CARE
Problem: Skin Integrity:  Goal: Will show no infection signs and symptoms  Description: Will show no infection signs and symptoms  Outcome: Ongoing  Goal: Absence of new skin breakdown  Description: Absence of new skin breakdown  Outcome: Ongoing     Problem: Falls - Risk of:  Goal: Will remain free from falls  Description: Will remain free from falls  Outcome: Ongoing  Goal: Absence of physical injury  Description: Absence of physical injury  Outcome: Ongoing     Problem: Pain:  Goal: Pain level will decrease  Description: Pain level will decrease  Outcome: Ongoing  Goal: Control of acute pain  Description: Control of acute pain  Outcome: Ongoing  Goal: Control of chronic pain  Description: Control of chronic pain  Outcome: Ongoing     Problem: Discharge Planning:  Goal: Discharged to appropriate level of care  Description: Discharged to appropriate level of care  Outcome: Ongoing

## 2020-12-17 NOTE — PROGRESS NOTES
[facility]  General Inpatient Hospice Progress Note    Date: 12/17/2020  Name: Nigel You  MRN: [id number]  YOB: 1945   Patient's PCP: Linda Espinoza MD   Oncology: Dr. Camelia Bacon care admission: 12/13 to 12/16/2020 at [address] [place] [address] to General Inpatient Hospice: 12/16/2020 at [facility]    Subjective: The patient is comfortable and sleeping, awakens to voice. Her pain is controlled and has not required analgesics in the past 24 hours. The nausea is better with Haloperidol. She ate some soft food yesterday. There is ostomy output. I collaborated with the patient's nurse and with the patient's daughter, [facility], at the bedside. The goal is to try to get home with hospice, possibly 12/18 if symptoms are managed. Objective:   Pain is managed with Hydromorphone IV prn with 0 doses in the past 24 hours, Haloperidol for nausea, Glycopyrrolate IV is available for secretions, and Lorazepam is available for anxiety. Data reviewed 12/17/2020:  CT-scan of abdomen and pelvis 12/13/20:  1. Multiple dilated small bowel loops with diffuse wall thickening and   pneumatosis, consistent with bowel ischemia. 2. Ascites and pneumoperitoneum with extensive portal venous gas. 3. Hepatic metastases are less conspicuous than on prior contrasted   examination.    4. Noncalcified pulmonary nodules, consistent with metastases.  These appear   increased in size in the interval.      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         CEA 11/2/20: 146.8 ==> 114.6 on 11/16/20     Lactate 6.7      Recent Labs     12/13/20  2100   AST 50*   ALT 27   LIPASE 5*   BILITOT 1.6*   ALKPHOS 145*            Lab Results   Component Value Date     ALKPHOS 145 12/13/2020     ALT 27 12/13/2020     AST 50 12/13/2020     PROT 5.8 12/13/2020     PROT 7.5 12/10/2015     BILITOT 1.6 12/13/2020     BILIDIR 0.3 12/10/2015     LABALBU 2.9 12/13/2020      CBC:   Recent Labs     12/13/20  2045   WBC 2.0*   HGB 9.5*   HCT 31.4*   MCV 88.7   *      BMP:       Recent Labs     12/13/20  2100      K 3.5   CL 97*   CO2 28   BUN 38*   CREATININE 1.3*   GLUCOSE 192*      Physical Exam:   Blood pressure (!) 109/59, pulse 87, temperature 96.5 °F (35.8 °C), temperature source Oral, resp. rate 18, height 5' 4\" (1.626 m), weight 126 lb 14 oz (57.6 kg), SpO2 94 %, not currently breastfeeding. General: drowsy but arousable, mild pallor, no grimacing today with abdominal exam  HEENT: Mucous membranes are dry, sclerae are clear  Chest: Mediport on right   Heart: tachycardic RRR, S1S2, no murmurs  Lungs:  equal breath sounds bilaterally, diminished at the bases, without rales, scattered rhonchi   Abdomen: soft, bowel sounds quiet, ostomy present with liquid output, there is less abdominal tenderness without guarding, less distended  Extremities:  No mottling, toes are warm  Neurologic: lethargic, generally weak     Assessment/Plan:  1. Colon cancer diagnosed October 2017 with hepatic and pulmonary metastases April 2019 and recent disease progression, presenting with pneumoperitoneum and ischemic colitis. The patient was not felt to be a surgical candidate. Will try sublingual Morphine if needed for pain, and possible discharge 12/18 if pain and nausea are managed. DME to be delivered. Comfort medications are in place. PPS 20%. Try to get up in the chair. 2. Ischemic colitis with pneumoperitoneum and pneumatosis  3. Hypotension with history of hypertension, off her home medications  4. Pulmonary metastases  5. Elevated Lactate  6.  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

## 2020-12-18 PROCEDURE — 6370000000 HC RX 637 (ALT 250 FOR IP): Performed by: FAMILY MEDICINE

## 2020-12-18 PROCEDURE — 1250000000 HC SEMI PRIVATE HOSPICE R&B

## 2020-12-18 RX ORDER — LORAZEPAM 0.5 MG/1
0.5 TABLET ORAL EVERY 4 HOURS PRN
Qty: 20 TABLET | Refills: 0 | Status: SHIPPED | OUTPATIENT
Start: 2020-12-18 | End: 2021-01-17

## 2020-12-18 RX ORDER — MORPHINE SULFATE 20 MG/ML
5-10 SOLUTION ORAL
Qty: 30 ML | Refills: 0 | Status: SHIPPED | OUTPATIENT
Start: 2020-12-18 | End: 2021-01-17

## 2020-12-18 RX ORDER — HALOPERIDOL 2 MG/ML
SOLUTION ORAL
Qty: 60 ML | Refills: 0 | Status: SHIPPED | OUTPATIENT
Start: 2020-12-18

## 2020-12-18 RX ADMIN — MORPHINE SULFATE 5 MG: 20 SOLUTION ORAL at 20:19

## 2020-12-18 RX ADMIN — Medication 1 MG: at 09:13

## 2020-12-18 RX ADMIN — Medication 1 MG: at 18:28

## 2020-12-18 ASSESSMENT — PAIN SCALES - GENERAL: PAINLEVEL_OUTOF10: 4

## 2020-12-18 NOTE — PLAN OF CARE
Problem: Skin Integrity:  Goal: Will show no infection signs and symptoms  Description: Will show no infection signs and symptoms  12/18/2020 1017 by Demian Mack RN  Outcome: Ongoing  12/18/2020 0114 by Yasmine Foy LPN  Outcome: Ongoing  Goal: Absence of new skin breakdown  Description: Absence of new skin breakdown  12/18/2020 1017 by Demian Mack RN  Outcome: Ongoing  12/18/2020 0114 by Yasmine Foy LPN  Outcome: Ongoing     Problem: Falls - Risk of:  Goal: Will remain free from falls  Description: Will remain free from falls  12/18/2020 1017 by Demian Mack RN  Outcome: Ongoing  12/18/2020 0114 by Yasmine Foy LPN  Outcome: Ongoing  Goal: Absence of physical injury  Description: Absence of physical injury  12/18/2020 1017 by Demian Mack RN  Outcome: Ongoing  12/18/2020 0114 by Yasmine Foy LPN  Outcome: Ongoing     Problem: Pain:  Goal: Pain level will decrease  Description: Pain level will decrease  12/18/2020 1017 by Demian Mack RN  Outcome: Ongoing  12/18/2020 0114 by Yasmine Foy LPN  Outcome: Ongoing  Goal: Control of acute pain  Description: Control of acute pain  12/18/2020 1017 by Demian Mack RN  Outcome: Ongoing  12/18/2020 0114 by Yasmine Foy LPN  Outcome: Ongoing  Goal: Control of chronic pain  Description: Control of chronic pain  12/18/2020 1017 by Demian Mack RN  Outcome: Ongoing  12/18/2020 0114 by Yasmine Foy LPN  Outcome: Ongoing     Problem: Discharge Planning:  Goal: Discharged to appropriate level of care  Description: Discharged to appropriate level of care  12/18/2020 1017 by Demian Mack RN  Outcome: Ongoing  12/18/2020 0114 by Yasmine Foy LPN  Outcome: Ongoing

## 2020-12-18 NOTE — PROGRESS NOTES
1030: Family was concerned that not all of the prescriptions would be available at [facility] to fill at patient's discharge. This nurse called the [facility] to verify what medications they had available in order to get the prescriptions filled. [facility] does not have Haldol in stock and they stated they would not be able to get it until Monday, but the pharmacist recommended calling the [facility]. This nurse called the [facility] in [place] and they do have oral liquid Haldol in stock and they could fill the prescription. 1040:Called [facility] to inquire about a time the patient is going home and if transport was set up yet. Patient's son states that they called to let him know they were delivering the equipment and bed to the home today and they would call to let him know when the truck was arriving. Family was wondering when transport would be set up today to bring the patient home. The admitting office said Al[facility] is working on setting everything up and she will call and update when things are ready. Family was notified of all the information above at bedside.

## 2020-12-18 NOTE — PROGRESS NOTES
This nurse went over all discharge instructions with the , daughter and son in the room. Gambino care, ostomy care, skin care and medications were discussed and all questions answered.

## 2020-12-18 NOTE — DISCHARGE INSTR - PHARMACY
[facility] carries LORazepam 0.5mg tablet and Morphine 20MG/ML Soln. Please fill Haloperidol 2mg/ML solution at the [facility] in [place].  Address: [address], [place], [address] [place] [address]  Phone: [phone]

## 2020-12-18 NOTE — DISCHARGE INSTR - COC
chart pt had port insertion with admission 10/2017       Immunization History:   Immunization History   Administered Date(s) Administered    Influenza Vaccine, unspecified formulation 11/12/2008    Influenza, Quadv, IM, PF (6 mo and older Fluzone, Flulaval, Fluarix, and 3 yrs and older Afluria) 11/12/2019    Pneumococcal Polysaccharide ([id number]) 07/03/2012    Tdap (Boostrix, Adacel) 10/28/2014       Active Problems:  Patient Active Problem List   Diagnosis Code    Mixed hyperlipidemia E78.2    Aortic valve prosthesis present Z95.2    S/P CABG x 1 Z95.1    Fatigue R53.83    Family history of coronary artery disease Z82.49    Hypertension I10    Malignant neoplasm of sigmoid colon ([facility] C18.7    Pleural effusion, right J90    Nocturnal hypoxemia G47.34    Syncope and collapse R55    COPD, mild (HCC) J44.9    Multiple lung nodules on CT R91.8    GIB (gastrointestinal bleeding) K92.2    Acute on chronic anemia D64.9    Lower GI bleed K92.2    Anemia D64.81, T45.1X5A    Drug induced neutropenia D70.2    Malignant neoplasm metastatic to right lung (HCC) C78.01    Liver metastasis (HCC) C78.7    Ischemia, bowel (HCC) K55.9    Acute intestinal ischemia ([facility] K55.059    Hospice care Z51.5    [facility]schem[facility] ([facility] K55.9       Isolation/Infection:   Isolation          No Isolation        Patient Infection Status     None to display          Nurse Assessment:  Last Vital Signs: BP (!) 109/55   Pulse 84   Temp 98.4 °F (36.9 °C) (Infrared)   Resp 14   Ht 5' 4\" (1.626 m)   Wt 126 lb 14 oz (57.6 kg)   SpO2 92%   BMI 21.78 kg/m²     Last documented pain score (0-10 scale): Pain Level: 4  Last Weight:   Wt Readings from Last 1 Encounters:   12/16/20 126 lb 14 oz (57.6 kg)     Mental Status:  {IP PT MENTAL STATUS:62529}    IV Access:  {[facility] IV ACCESS:280755129}    Nursing Mobility/ADLs:  Walking   {[facility] ERFR:159182704}  Transfer  {[facility] TVBF:513624910}  Bathing  {[facility] BSUS:514344159}  Dressing  {[facility] IQYT:718988648}  Toileting  {[facility] NSXK:575975871}  Feeding  {[facility] NNLI:283628023}  Med Admin  {[facility] MSEC:036926863}  Med Delivery   { [facility] MED Delivery:553111666}    Wound Care Documentation and Therapy:  Wound 19 Left Abdomen  (Active)   Number of days: 450        Elimination:  Continence:   · Bowel: {YES / YT:70223}  · Bladder: {YES / ZW:46619}  Urinary Catheter: {Urinary Catheter:375745366}   Colostomy/Ileostomy/Ileal Conduit: {YES / MN:38524}  Colostomy LLQ-Stomal Appliance: 2 piece  Colostomy LLQ-Stoma  Assessment: Pink, Red  Colostomy LLQ-Peristomal Assessment: Clean, Intact  Colostomy LLQ-Treatment: Bag change  Colostomy LLQ-Stool Appearance: Watery  Colostomy LLQ-Stool Color: Brown  Colostomy LLQ-Stool Amount: Small  Colostomy LLQ-Output (mL): 100 ml    Date of Last BM: ***    Intake/Output Summary (Last 24 hours) at 2020 1033  Last data filed at 2020 0606  Gross per 24 hour   Intake --   Output 1450 ml   Net -1450 ml     I/O last 3 completed shifts:  In: -   Out: 1450 [Urine:650; Stool:800]    Safety Concerns:     [employer] Safety Concerns:476637193}    Impairments/Disabilities:      [employer] Impairments/Disabilities:103597308}    Nutrition Therapy:  Current Nutrition Therapy:   [employer] Diet List:701639703}    Routes of Feeding: {[facility] Other Feedings:956072992}  Liquids: {Slp liquid thickness:44112}  Daily Fluid Restriction: {[facility] D[facility] Yes amt example:878358992}  Last Modified Barium Swallow with Video (Video Swallowing Test): {Done Not Done TJLF:350812344}    Treatments at the Time of Hospital Discharge:   Respiratory Treatments: ***  Oxygen Therapy:  {Therapy; copd oxygen:88061}  Ventilator:    { CC Vent XYEX:977248170}    Rehab Therapies: {THERAPEUTIC INTERVENTION:0979538370}  Weight Bearing Status/Restrictions: [employer]  Weight Bearin}  Other Medical Equipment (for information only, NOT a DME order):  {EQUIPMENT:342205221}  Other Treatments: ***    Patient's personal belongings (please select all that are sent with patient):  {[facility] Belongings:988794018}    RN SIGNATURE:  {Esignature:419096832}    CASE MANAGEMENT/SOCIAL WORK SECTION    Inpatient Status Date: ***    Readmission Risk Assessment Score:  Readmission Risk              Risk of Unplanned Readmission:        18           Discharging to Facility/ Agency   · Name:   · Address:  · Phone:  · Fax:    Dialysis Facility (if applicable)   · Name:  · Address:  · Dialysis Schedule:  · Phone:  · Fax:    / signature: {Esignature:898790897}    PHYSICIAN SECTION    Prognosis: {Prognosis:1254286241}    Condition at Discharge: [facility] Patient Condition:484607481}    Rehab Potential (if transferring to Rehab): {Prognosis:3428576834}    Recommended Labs or Other Treatments After Discharge: ***    Physician Certification: I certify the above information and transfer of Jeremy Yan  is necessary for the continuing treatment of the diagnosis listed and that she requires {Admit to Appropriate Level of Care:24883} for {GREATER/LESS:558521755} 30 days.      Update Admission H&P: {[facility] DME Changes in TUTLA:839892810}    PHYSICIAN SIGNATURE:  {Esignature:400968125}

## 2020-12-18 NOTE — PROGRESS NOTES
Paper prescriptions were given to the son and  to get filled prior to patient discharge. Family states they were able to get the medications filled and that the bed and equipment would be delivered to their house around 1430.

## 2020-12-18 NOTE — DISCHARGE SUMMARY
[facility] Discharge Summary    Date: 12/18/2020  Name: Maurisio Evans  MRN: [id number]  YOB: 1945     Patient's PCP: Terrel Barthel, MD   Oncology: Dr. Teresa Tamez care admission: 12/13 to 12/16/2020 at [facility]  Date of Admission: 12/16/2020 to [address] [place] [address]  Date of Discharge: 12/18/2020    Admitting Physician: Anthony Rucker MD to [address] [place] [address]  Discharge Physician: Faviola Laboy MD  Consultation: phone consultation with General Surgery per ED physician, and not surgical candidate  Disposition: home  with [facility]   Advanced directives: DNR-comfort care    Invasive procedures: none    Discharge Diagnoses:   1. Colon cancer diagnosed October 2017 with hepatic and pulmonary metastases April 2019 and recent disease progression, presenting with pneumoperitoneum and ischemic colitis. The patient was not felt to be a surgical candidate. 2. Ischemic colitis with pneumoperitoneum and pneumatosis  3. Hypotension with history of hypertension, off her home medications  4. Pulmonary metastases  5. Elevated Lactate  6.  DNR-comfort care      Patient Active Problem List   Diagnosis Code    Mixed hyperlipidemia E78.2    Aortic valve prosthesis present Z95.2    S/P CABG x 1 Z95.1    Fatigue R53.83    Family history of coronary artery disease Z82.49    Hypertension I10    Malignant neoplasm of sigmoid colon ([facility] C18.7    Pleural effusion, right J90    Nocturnal hypoxemia G47.34    Syncope and collapse R55    COPD, mild (HCC) J44.9    Multiple lung nodules on CT R91.8    GIB (gastrointestinal bleeding) K92.2    Acute on chronic anemia D64.9    Lower GI bleed K92.2    Anemia D64.81, T45.1X5A    Drug induced neutropenia D70.2    Malignant neoplasm metastatic to right lung (HCC) C78.01    Liver metastasis (HCC) C78.7    Ischemia, bowel (HCC) K55.9    Acute intestinal ischemia ([facility] [address] [facility] Z51.5    Ischemic colitis ([facility] K55.9       Brief History, reason for admission: Please see the acute care H+P, discharge summary, consultants notes, and my notes. The patient was admitted to [facility]. This is a 77year old patient with a history of colon cancer diagnosed in October 2017, metastatic to lung (in April 2019) and liver on chemotherapy with prior colon resection with colostomy by Dr Poli Lau, and other history of hypertension, aortic stenosis with valve replacement, GERD, who was admitted on 12/13/2020 with severe abdominal pain, nausea and vomiting. The patient had recent disease progression by CT imaging in October 2020 and her most recent chemotherapy was 12/1/20 (see Oncology summary below).      In the ED, lactate was elevated at 6.7 mMol/liter, white count was 2,000 with monocytosis, serum albumin is 2.9 gm/dl. CT-scan of abdomen and pelvis showed pneumoperitoneum with extensive portal venous gas, dilated small bowel loops with wall thickening, and was felt to be consistent with ischemic colitis. Dr Poli Lau was contacted, and did not feel the the patient would benefit from surgery, and the patient was admitted to the hospitalist service at [facility], and hospice consulted. The patient is currently comfortable unless her abdomen is examined. She does have liquid ostomy output.        Hospice philosophy was discussed with the patient and 2 children regarding care and comfort at the end of life. I am not sure how much the patient comprehended due to her clinical status. Questions were answered, and emotional support was provided. They are aware that [facility] is for the acute management of symptoms, and if the patient stabilizes, alternative arrangements for home Hospice or extended care facility will be necessary.  The patient is DNR-comfort care status.       On 12/16/20, the patient is currently comfortable, with intermittent pain and nausea. The Zofran wasn't helping the nausea, and Hydromorphone helps the pain. She is drinking fluids. Her abdomen is distended and there is ostomy output. I collaborated with the patient's nurse, case management, and with the patient's son, Deshawn, at the bedside. Discussion was had regarding home hospice.       I collaborated with the hospice nurse Ml Elizondo the patient's family at 0900 12/16/20 for consents for [address] [place] [address]. The family is asking about home hospice and that was discussed, but the patient is admitted to [address] [place] [address] for management of nausea, and we will start scheduled and prn Haloperidol for the nausea. Hospital Course: The patient was admitted on 12/16/2020  to [facility]  with the above, for complete details, please see the History and Physicial. The patient was treated with opioids for pain, initially Zofran for nausea that didn't help much. Haloperidol was added for nausea, and was beneficial. The patient was able to take fluids by mouth and some soft foods. She had ostomy output. The goal was to go home when symptoms are managed, and the patient is discharged to home with hospice support. A hospital bed and DME are arranged, and Rx's are provided for comfort medications. On 12/18/20, the patient denies any pain or nausea. The Haloperidol has been beneficial. She has tolerated the Roxanol sublingually, and has not needed any doses overnight. She is looking forward to going home. Will leave Labadie in at discharge. I had a long discussion with the patient's son, Yoni Llamas, at the bedside and answered his questions. I collaborated with the patient's nurse and the hospice nurse. I did complete [facility] paperwork for the patient's daughter, Jackie Bustamante, and gave to Yoni Llamas.      I spent 55 minutes in preparation for discharge, coordination with the patient, family, nurse and the hospice nurse. Data reviewed 12/18/2020:  CT-scan of abdomen and pelvis 12/13/20:  1. Multiple dilated small bowel loops with diffuse wall thickening and   pneumatosis, consistent with bowel ischemia. 2. Ascites and pneumoperitoneum with extensive portal venous gas. 3. Hepatic metastases are less conspicuous than on prior contrasted   examination. 4. Noncalcified pulmonary nodules, consistent with metastases.  These appear   increased in size in the interval.      Final Pathologic Diagnosis 4/24/2019:    Lung, needle biopsy:   -  METASTATIC ADENOCARCINOMA CONSISTENT WITH A COLORECTAL PRIMARY         CEA 11/2/20: 146.8 ==> 114.6 on 11/16/20     Lactate 6.7        Recent Labs     12/13/20  2100   AST 50*   ALT 27   LIPASE 5*   BILITOT 1.6*   ALKPHOS 145*                Lab Results   Component Value Date     ALKPHOS 145 12/13/2020     ALT 27 12/13/2020     AST 50 12/13/2020     PROT 5.8 12/13/2020     PROT 7.5 12/10/2015     BILITOT 1.6 12/13/2020     BILIDIR 0.3 12/10/2015     LABALBU 2.9 12/13/2020      CBC:       Recent Labs     12/13/20  2045   WBC 2.0*   HGB 9.5*   HCT 31.4*   MCV 88.7   *      BMP:         Recent Labs     12/13/20  2100      K 3.5   CL 97*   CO2 28   BUN 38*   CREATININE 1.3*   GLUCOSE 192*      Physical Exam:   Blood pressure (!) 109/55, pulse 84, temperature 98.4 °F (36.9 °C), temperature source Infrared, resp. rate 14, height 5' 4\" (1.626 m), weight 126 lb 14 oz (57.6 kg), SpO2 92 %, not currently breastfeeding.   General: drowsy but arousable, mild pallor, no grimacing today with abdominal exam  HEENT: Mucous membranes are mildly dry, sclerae are clear  Chest: Mediport on right   Heart: RRR, S1S2, no murmurs  Lungs:  equal breath sounds bilaterally, diminished at the bases, without rales, scattered rhonchi   Abdomen: soft, bowel sounds quiet, ostomy present with liquid output, there is less abdominal tenderness without guarding, less distended  Extremities:  No mottling,

## 2020-12-19 VITALS
HEART RATE: 83 BPM | WEIGHT: 126.88 LBS | RESPIRATION RATE: 16 BRPM | SYSTOLIC BLOOD PRESSURE: 110 MMHG | TEMPERATURE: 98.9 F | OXYGEN SATURATION: 93 % | DIASTOLIC BLOOD PRESSURE: 62 MMHG | BODY MASS INDEX: 21.66 KG/M2 | HEIGHT: 64 IN

## 2020-12-19 PROCEDURE — 6370000000 HC RX 637 (ALT 250 FOR IP): Performed by: FAMILY MEDICINE

## 2020-12-19 RX ADMIN — MORPHINE SULFATE 10 MG: 20 SOLUTION ORAL at 11:04

## 2020-12-19 RX ADMIN — MORPHINE SULFATE 10 MG: 20 SOLUTION ORAL at 07:58

## 2020-12-19 RX ADMIN — Medication 1 MG: at 08:06

## 2020-12-19 ASSESSMENT — PAIN DESCRIPTION - ONSET
ONSET: GRADUAL
ONSET: OTHER (COMMENT)

## 2020-12-19 ASSESSMENT — PAIN DESCRIPTION - LOCATION: LOCATION: GENERALIZED

## 2020-12-19 ASSESSMENT — PAIN DESCRIPTION - DESCRIPTORS
DESCRIPTORS: ACHING;CONSTANT;DISCOMFORT
DESCRIPTORS: ACHING;CONSTANT;DISCOMFORT

## 2020-12-19 ASSESSMENT — PAIN SCALES - GENERAL
PAINLEVEL_OUTOF10: 5
PAINLEVEL_OUTOF10: 7

## 2020-12-19 ASSESSMENT — PAIN - FUNCTIONAL ASSESSMENT: PAIN_FUNCTIONAL_ASSESSMENT: PREVENTS OR INTERFERES SOME ACTIVE ACTIVITIES AND ADLS

## 2020-12-19 ASSESSMENT — PAIN DESCRIPTION - PROGRESSION: CLINICAL_PROGRESSION: GRADUALLY WORSENING

## 2020-12-19 ASSESSMENT — PAIN DESCRIPTION - PAIN TYPE
TYPE: ACUTE PAIN
TYPE: ACUTE PAIN

## 2020-12-19 ASSESSMENT — PAIN DESCRIPTION - FREQUENCY: FREQUENCY: INTERMITTENT

## 2020-12-19 NOTE — PROGRESS NOTES
At 1950 this nurse received a phone call from Jessie with the [address] She stated that due to a staffing emergency, [facility] would have to completely cancel this evening's  of the patient that was originally scheduled for 1900. She stated that she would have their own transport crew come to  the patient at 11:00 AM tomorrow morning (12/19/20). This nurse then informed the family and patient of the changes and were all in agreement with the new transportation schedule. Supervisor called and notified of the changes as well.

## 2021-09-08 NOTE — PROGRESS NOTES
colon resection with end colostomy, bladder dome resection and closure, segment of small bowel resection, on October 7, 2017. Final pathology revealed low-grade invasive adenocarcinoma. Chronic inflammation and fibrosis with focal abscess associated with invasive adenocarcinoma. Small segment of small intestine revealed mild chronic nonspecific mucosal enteritis. Tumor size was 4.3 x 4.5 cm in diameter and all the surgical margins were negative. Lymphovascular invasion and perineural invasion were present. Eight out of 11 lymph nodes examined were positive for metastasis (pT3 pN2b pMx). Her CEA level on October 8, 2017, was 6.5 ng/mL. She was discharged from the hospital with an appointment to see me as an outpatient. After a long discussion with MsBrandi Joya Johana, she has decided to have adjuvant chemotherapy with FOLFOX for her stage IIIC colon cancer. Adjuvant chemotherapy with FOLFOX was started on November 28, 2017 and we stopped it on April 24, 2018 (total 10 treatments), after she experienced syncope attack after last treatment. We decided to follow her closely. She underwent colonoscopy by Dr. Marshall Romano on January 24, 2018 and it was a normal study according to her. She has CT scan of the chest, abdomen and pelvis on October 15, 2018 and it showed postsurgical changes from partial colectomy with left lower quadrant colostomy. Bilateral 2-6 mm pulmonary nodules, new since 2014, one of which has slightly increased in size since 2017 in the left upper lobe. These are suspicious for metastatic disease. Grossly stable 1.2 cm hypoenhancing lesion in segment VII of liver which was less conspicuous on the prior exam. Given stability, findings favor benign process. No new findings of metastatic disease in the abdomen or pelvis. Cholelithiasis. CT scan of the chest done on 4/10/19 showed increased size and number bilateral pulmonary metastases.  At least 4 liver lesions as above, at least 1 appearing unchanged since 07/28/2014. Metastatic disease, primary malignancy, and benign processes such as cysts and hemangioma should be considered. Recommend further evaluation with liver protocol abdomen MRI (using a nonhepatobiliary contrast agent) or CT. She underwent CT guided biopsy of the lung nodules on 4/24/19 and final pathology showed metastatic adenocarcinoma, consistent with a colorectal primary. KRAS, NRAS, BRAF study were negative. MSI reveals stable disease. Since she is found to have metastatic colon cancer, we started first line chemotherapy with FOLFOX and panitumumab since 5/22/19. She admitted to the hospital on September 22, 2019 and released from the hospital on September 24, 2019. She was diagnosed with C. difficile colitis and required treatment at that time. CT scan of the chest done on May 27, 2020 showed mixed response of pulmonary metastases, with decrease in size of the largest pulmonary nodules, although, there has been development of new subcentimeter pulmonary nodules in both lungs. Worsening hepatic metastatic disease, with new lesions measuring up to 1.8 cm in the liver dome. On September 14, 2020, she presented to me for follow up. I have been following Ms. Maria Teresa Quiros for stage IIIC low-grade invasive sigmoid colon adenocarcinoma and she is status post surgery followed by adjuvant chemotherapy with FOLFOX. CT scan on 4/10/19 showed increase in size and numbers of bilateral pulmonary nodules. CEA done the same day also showed increasing (52.9). She subsequently underwent CT guided biopsy of lung nodules on 4/24/19 and final pathology showed metastatic adenocarcinoma consistent with colorectal primary. All MELISSA study, BRAF were negative and MSI studies showed she has MSI stable disease. Since she is found to have metastatic colon cancer, we started first line chemotherapy with FOLFOX and panitumumab since 5/22/19.  We stopped it on July 1, 2020 since she is found to have oriented to name, place and time. Motor skills grossly intact. SKIN: Normal skin color, texture, turgor and no jaundice.  appears intact   EXTREMITIES: no LE edema     Labs:    Hematology:  Lab Results   Component Value Date    WBC 3.2 (L) 08/31/2020    RBC 3.73 (L) 08/31/2020    HGB 10.7 (L) 08/31/2020    HCT 34.3 (L) 08/31/2020    MCV 92.0 08/31/2020    MCH 28.7 08/31/2020    MCHC 31.2 (L) 08/31/2020    RDW 15.1 (H) 08/31/2020     08/31/2020    MPV 10.9 08/31/2020    BANDSPCT 13 (H) 10/08/2017    SEGSPCT 39.3 08/31/2020    EOSRELPCT 3.5 (H) 08/31/2020    BASOPCT 0.6 08/31/2020    LYMPHOPCT 33.2 08/31/2020    MONOPCT 23.4 (H) 08/31/2020    BANDABS 0.56 10/08/2017    SEGSABS 1.2 08/31/2020    EOSABS 0.1 08/31/2020    BASOSABS 0.0 08/31/2020    LYMPHSABS 1.1 08/31/2020    MONOSABS 0.7 08/31/2020    DIFFTYPE AUTOMATED DIFFERENTIAL 08/31/2020     No results found for: ESR    Chemistry:  Lab Results   Component Value Date     08/31/2020    K 3.8 08/31/2020     08/31/2020    CO2 26 08/31/2020    BUN 9 08/31/2020    CREATININE 1.0 08/31/2020    GLUCOSE 110 (H) 08/31/2020    CALCIUM 9.6 08/31/2020    PROT 7.0 08/31/2020    LABALBU 3.9 08/31/2020    BILITOT 0.6 08/31/2020    ALKPHOS 452 (H) 08/31/2020    AST 55 (H) 08/31/2020    ALT 26 08/31/2020    LABGLOM 54 (L) 08/31/2020    GFRAA >60 08/31/2020    PHOS 4.7 11/12/2019    MG 2.4 01/06/2020    POCCA 1.20 08/25/2020    POCGLU 167 (H) 08/25/2020     No results found for: MMA, LDH, HOMOCYSTEINE  No components found for: LD  No results found for: TSHHS, T4FREE, FT3    Immunology:  Lab Results   Component Value Date    PROT 7.0 08/31/2020     No results found for: Rima Jimenes, KLFLCR  No results found for: B2M    Coagulation Panel:  Lab Results   Component Value Date    PROTIME 11.6 (L) 02/24/2020    INR 1.02 02/24/2020    APTT 19.3 (L) 02/24/2020       Anemia Panel:  Lab Results   Component Value Date    SNUFKVTH73 306.6 02/03/2020    FOLATE >20.0 Right arm;

## 2025-06-20 NOTE — ED NOTES
Blood bank states will have to order specific blood from [facility] due to antibodies. Unknown ETA for blood. Dr. Anni Hudson aware.        [facility], RN  01/06/20 1278 Please feel free to contact us at [phone] if any problems arise. After 6PM, Monday through Friday, on weekends and on holidays, please call [phone] and ask for the radiology resident on call to be paged.

## (undated) DEVICE — Z DISCONTINUED NO SUB IDED TUBING ETCO2 AD L6.5FT NSL ORAL CVD PRNG NONFLARED TIP OVR